# Patient Record
Sex: FEMALE | Race: BLACK OR AFRICAN AMERICAN | NOT HISPANIC OR LATINO | Employment: OTHER | URBAN - METROPOLITAN AREA
[De-identification: names, ages, dates, MRNs, and addresses within clinical notes are randomized per-mention and may not be internally consistent; named-entity substitution may affect disease eponyms.]

---

## 2017-03-08 PROBLEM — R11.2 NAUSEA & VOMITING: Status: ACTIVE | Noted: 2017-03-08

## 2017-03-08 PROBLEM — R05.9 COUGH: Status: ACTIVE | Noted: 2017-03-08

## 2017-03-08 PROBLEM — R68.83 CHILLS: Status: ACTIVE | Noted: 2017-03-08

## 2017-03-08 PROBLEM — R50.9 FEVER: Status: ACTIVE | Noted: 2017-03-08

## 2017-03-23 PROBLEM — J11.1 INFLUENZA: Status: ACTIVE | Noted: 2017-03-23

## 2021-12-21 ENCOUNTER — IMMUNIZATION (OUTPATIENT)
Dept: INTERNAL MEDICINE | Facility: CLINIC | Age: 76
End: 2021-12-21
Payer: MEDICARE

## 2021-12-21 DIAGNOSIS — Z23 NEED FOR VACCINATION: Primary | ICD-10-CM

## 2021-12-21 PROCEDURE — 0004A COVID-19, MRNA, LNP-S, PF, 30 MCG/0.3 ML DOSE VACCINE: CPT | Mod: PBBFAC,CV19

## 2022-04-12 ENCOUNTER — OFFICE VISIT (OUTPATIENT)
Dept: OPTOMETRY | Facility: CLINIC | Age: 77
End: 2022-04-12
Payer: COMMERCIAL

## 2022-04-12 DIAGNOSIS — H25.13 NUCLEAR SCLEROSIS, BILATERAL: ICD-10-CM

## 2022-04-12 DIAGNOSIS — H52.4 PRESBYOPIA: Primary | ICD-10-CM

## 2022-04-12 DIAGNOSIS — Z13.5 GLAUCOMA SCREENING: ICD-10-CM

## 2022-04-12 PROCEDURE — 99999 PR PBB SHADOW E&M-EST. PATIENT-LVL III: CPT | Mod: PBBFAC,,, | Performed by: OPTOMETRIST

## 2022-04-12 PROCEDURE — 92004 PR EYE EXAM, NEW PATIENT,COMPREHESV: ICD-10-PCS | Mod: S$GLB,,, | Performed by: OPTOMETRIST

## 2022-04-12 PROCEDURE — 99999 PR PBB SHADOW E&M-EST. PATIENT-LVL III: ICD-10-PCS | Mod: PBBFAC,,, | Performed by: OPTOMETRIST

## 2022-04-12 PROCEDURE — 92004 COMPRE OPH EXAM NEW PT 1/>: CPT | Mod: S$GLB,,, | Performed by: OPTOMETRIST

## 2022-04-12 PROCEDURE — 92015 DETERMINE REFRACTIVE STATE: CPT | Mod: S$GLB,,, | Performed by: OPTOMETRIST

## 2022-04-12 PROCEDURE — 92015 PR REFRACTION: ICD-10-PCS | Mod: S$GLB,,, | Performed by: OPTOMETRIST

## 2022-04-12 NOTE — PROGRESS NOTES
HPI     ENDER: 2 years ago    Presents today to establish ocular health care.  No vision complaints.      Last edited by Alia Oneill MA on 4/12/2022  9:39 AM. (History)        ROS     Negative for: Constitutional, Gastrointestinal, Neurological, Skin,   Genitourinary, Musculoskeletal, HENT, Endocrine, Cardiovascular, Eyes,   Respiratory, Psychiatric, Allergic/Imm, Heme/Lymph    Last edited by Duarte Perez, OD on 4/12/2022 11:14 AM. (History)        Assessment /Plan     For exam results, see Encounter Report.    Presbyopia    Glaucoma screening    Nuclear sclerosis, bilateral       present for exam    1. Cat OU--wrote optional new Rx  2. MELISSA--advised SOOTHE XP ATs prn    PLAN:    rtc 1 yr                  F25.9

## 2022-05-04 ENCOUNTER — HOSPITAL ENCOUNTER (OUTPATIENT)
Facility: HOSPITAL | Age: 77
Discharge: HOME | End: 2022-05-05
Attending: EMERGENCY MEDICINE | Admitting: INTERNAL MEDICINE
Payer: MEDICARE

## 2022-05-04 DIAGNOSIS — R41.89 COGNITIVE IMPAIRMENT: ICD-10-CM

## 2022-05-04 DIAGNOSIS — I67.4 HYPERTENSIVE ENCEPHALOPATHY: Primary | ICD-10-CM

## 2022-05-04 DIAGNOSIS — R07.9 CHEST PAIN: ICD-10-CM

## 2022-05-04 DIAGNOSIS — I16.0 HYPERTENSIVE URGENCY: ICD-10-CM

## 2022-05-04 DIAGNOSIS — M79.7 FIBROMYALGIA: ICD-10-CM

## 2022-05-04 DIAGNOSIS — R41.82 CHANGE IN MENTAL STATUS: ICD-10-CM

## 2022-05-04 LAB
ALBUMIN SERPL BCP-MCNC: 3.9 G/DL (ref 3.5–5.2)
ALP SERPL-CCNC: 86 U/L (ref 55–135)
ALT SERPL W/O P-5'-P-CCNC: 14 U/L (ref 10–44)
AMMONIA PLAS-SCNC: 28 UMOL/L (ref 10–50)
AMPHET+METHAMPHET UR QL: NEGATIVE
ANION GAP SERPL CALC-SCNC: 7 MMOL/L (ref 8–16)
AST SERPL-CCNC: 22 U/L (ref 10–40)
BARBITURATES UR QL SCN>200 NG/ML: NEGATIVE
BASOPHILS # BLD AUTO: 0.07 K/UL (ref 0–0.2)
BASOPHILS NFR BLD: 1.2 % (ref 0–1.9)
BENZODIAZ UR QL SCN>200 NG/ML: NEGATIVE
BILIRUB SERPL-MCNC: 1 MG/DL (ref 0.1–1)
BILIRUB UR QL STRIP: NEGATIVE
BUN SERPL-MCNC: 11 MG/DL (ref 8–23)
BZE UR QL SCN: NEGATIVE
CALCIUM SERPL-MCNC: 9.6 MG/DL (ref 8.7–10.5)
CANNABINOIDS UR QL SCN: NEGATIVE
CHLORIDE SERPL-SCNC: 110 MMOL/L (ref 95–110)
CLARITY UR REFRACT.AUTO: CLEAR
CO2 SERPL-SCNC: 24 MMOL/L (ref 23–29)
COLOR UR AUTO: COLORLESS
CREAT SERPL-MCNC: 0.8 MG/DL (ref 0.5–1.4)
CREAT UR-MCNC: 22 MG/DL (ref 15–325)
CTP QC/QA: YES
DIFFERENTIAL METHOD: ABNORMAL
EOSINOPHIL # BLD AUTO: 0 K/UL (ref 0–0.5)
EOSINOPHIL NFR BLD: 0.7 % (ref 0–8)
ERYTHROCYTE [DISTWIDTH] IN BLOOD BY AUTOMATED COUNT: 13.7 % (ref 11.5–14.5)
EST. GFR  (AFRICAN AMERICAN): >60 ML/MIN/1.73 M^2
EST. GFR  (NON AFRICAN AMERICAN): >60 ML/MIN/1.73 M^2
ETHANOL SERPL-MCNC: <10 MG/DL
GLUCOSE SERPL-MCNC: 85 MG/DL (ref 70–110)
GLUCOSE UR QL STRIP: NEGATIVE
HCT VFR BLD AUTO: 36.2 % (ref 37–48.5)
HGB BLD-MCNC: 11.6 G/DL (ref 12–16)
HGB UR QL STRIP: NEGATIVE
IMM GRANULOCYTES # BLD AUTO: 0.01 K/UL (ref 0–0.04)
IMM GRANULOCYTES NFR BLD AUTO: 0.2 % (ref 0–0.5)
KETONES UR QL STRIP: NEGATIVE
LEUKOCYTE ESTERASE UR QL STRIP: ABNORMAL
LYMPHOCYTES # BLD AUTO: 1.7 K/UL (ref 1–4.8)
LYMPHOCYTES NFR BLD: 30.4 % (ref 18–48)
MAGNESIUM SERPL-MCNC: 2.2 MG/DL (ref 1.6–2.6)
MCH RBC QN AUTO: 27.6 PG (ref 27–31)
MCHC RBC AUTO-ENTMCNC: 32 G/DL (ref 32–36)
MCV RBC AUTO: 86 FL (ref 82–98)
METHADONE UR QL SCN>300 NG/ML: NEGATIVE
MICROSCOPIC COMMENT: NORMAL
MONOCYTES # BLD AUTO: 0.4 K/UL (ref 0.3–1)
MONOCYTES NFR BLD: 7.1 % (ref 4–15)
NEUTROPHILS # BLD AUTO: 3.4 K/UL (ref 1.8–7.7)
NEUTROPHILS NFR BLD: 60.4 % (ref 38–73)
NITRITE UR QL STRIP: NEGATIVE
NRBC BLD-RTO: 0 /100 WBC
OPIATES UR QL SCN: NEGATIVE
PCP UR QL SCN>25 NG/ML: NEGATIVE
PH UR STRIP: 6 [PH] (ref 5–8)
PLATELET # BLD AUTO: 251 K/UL (ref 150–450)
PMV BLD AUTO: 10 FL (ref 9.2–12.9)
POCT GLUCOSE: 84 MG/DL (ref 70–110)
POTASSIUM SERPL-SCNC: 3.9 MMOL/L (ref 3.5–5.1)
PROT SERPL-MCNC: 7.1 G/DL (ref 6–8.4)
PROT UR QL STRIP: NEGATIVE
RBC # BLD AUTO: 4.2 M/UL (ref 4–5.4)
RBC #/AREA URNS AUTO: 2 /HPF (ref 0–4)
SARS-COV-2 RDRP RESP QL NAA+PROBE: NEGATIVE
SODIUM SERPL-SCNC: 141 MMOL/L (ref 136–145)
SP GR UR STRIP: 1 (ref 1–1.03)
SQUAMOUS #/AREA URNS AUTO: 0 /HPF
TOXICOLOGY INFORMATION: NORMAL
TSH SERPL DL<=0.005 MIU/L-ACNC: 2.69 UIU/ML (ref 0.4–4)
URN SPEC COLLECT METH UR: ABNORMAL
WBC # BLD AUTO: 5.66 K/UL (ref 3.9–12.7)
WBC #/AREA URNS AUTO: 1 /HPF (ref 0–5)

## 2022-05-04 PROCEDURE — 99291 PR CRITICAL CARE, E/M 30-74 MINUTES: ICD-10-PCS | Mod: CS,,, | Performed by: EMERGENCY MEDICINE

## 2022-05-04 PROCEDURE — 99220 PR INITIAL OBSERVATION CARE,LEVL III: ICD-10-PCS | Mod: ,,, | Performed by: PHYSICIAN ASSISTANT

## 2022-05-04 PROCEDURE — 80307 DRUG TEST PRSMV CHEM ANLYZR: CPT | Performed by: EMERGENCY MEDICINE

## 2022-05-04 PROCEDURE — 83735 ASSAY OF MAGNESIUM: CPT | Performed by: EMERGENCY MEDICINE

## 2022-05-04 PROCEDURE — 99220 PR INITIAL OBSERVATION CARE,LEVL III: CPT | Mod: ,,, | Performed by: PHYSICIAN ASSISTANT

## 2022-05-04 PROCEDURE — 82077 ASSAY SPEC XCP UR&BREATH IA: CPT | Performed by: EMERGENCY MEDICINE

## 2022-05-04 PROCEDURE — U0002 COVID-19 LAB TEST NON-CDC: HCPCS | Performed by: EMERGENCY MEDICINE

## 2022-05-04 PROCEDURE — 93005 ELECTROCARDIOGRAM TRACING: CPT

## 2022-05-04 PROCEDURE — 81001 URINALYSIS AUTO W/SCOPE: CPT | Mod: 59 | Performed by: EMERGENCY MEDICINE

## 2022-05-04 PROCEDURE — G0378 HOSPITAL OBSERVATION PER HR: HCPCS

## 2022-05-04 PROCEDURE — 99291 CRITICAL CARE FIRST HOUR: CPT | Mod: CS,,, | Performed by: EMERGENCY MEDICINE

## 2022-05-04 PROCEDURE — 25500020 PHARM REV CODE 255: Performed by: INTERNAL MEDICINE

## 2022-05-04 PROCEDURE — 82962 GLUCOSE BLOOD TEST: CPT

## 2022-05-04 PROCEDURE — 84443 ASSAY THYROID STIM HORMONE: CPT | Performed by: EMERGENCY MEDICINE

## 2022-05-04 PROCEDURE — 82140 ASSAY OF AMMONIA: CPT | Performed by: EMERGENCY MEDICINE

## 2022-05-04 PROCEDURE — 96374 THER/PROPH/DIAG INJ IV PUSH: CPT | Mod: 59

## 2022-05-04 PROCEDURE — 80053 COMPREHEN METABOLIC PANEL: CPT | Performed by: EMERGENCY MEDICINE

## 2022-05-04 PROCEDURE — 99291 CRITICAL CARE FIRST HOUR: CPT | Mod: 25

## 2022-05-04 PROCEDURE — 93010 EKG 12-LEAD: ICD-10-PCS | Mod: ,,, | Performed by: INTERNAL MEDICINE

## 2022-05-04 PROCEDURE — 93010 ELECTROCARDIOGRAM REPORT: CPT | Mod: ,,, | Performed by: INTERNAL MEDICINE

## 2022-05-04 PROCEDURE — 25000003 PHARM REV CODE 250: Performed by: EMERGENCY MEDICINE

## 2022-05-04 PROCEDURE — 86803 HEPATITIS C AB TEST: CPT | Performed by: EMERGENCY MEDICINE

## 2022-05-04 PROCEDURE — 85025 COMPLETE CBC W/AUTO DIFF WBC: CPT | Performed by: EMERGENCY MEDICINE

## 2022-05-04 PROCEDURE — 96372 THER/PROPH/DIAG INJ SC/IM: CPT | Mod: 59 | Performed by: PHYSICIAN ASSISTANT

## 2022-05-04 PROCEDURE — 63600175 PHARM REV CODE 636 W HCPCS: Performed by: PHYSICIAN ASSISTANT

## 2022-05-04 PROCEDURE — A9585 GADOBUTROL INJECTION: HCPCS | Performed by: INTERNAL MEDICINE

## 2022-05-04 RX ORDER — LABETALOL HCL 20 MG/4 ML
10 SYRINGE (ML) INTRAVENOUS
Status: COMPLETED | OUTPATIENT
Start: 2022-05-04 | End: 2022-05-04

## 2022-05-04 RX ORDER — POLYETHYLENE GLYCOL 3350 17 G/17G
17 POWDER, FOR SOLUTION ORAL DAILY PRN
Status: DISCONTINUED | OUTPATIENT
Start: 2022-05-04 | End: 2022-05-05 | Stop reason: HOSPADM

## 2022-05-04 RX ORDER — HYDRALAZINE HYDROCHLORIDE 20 MG/ML
10 INJECTION INTRAMUSCULAR; INTRAVENOUS
Status: ACTIVE | OUTPATIENT
Start: 2022-05-04 | End: 2022-05-05

## 2022-05-04 RX ORDER — PROCHLORPERAZINE EDISYLATE 5 MG/ML
5 INJECTION INTRAMUSCULAR; INTRAVENOUS EVERY 6 HOURS PRN
Status: DISCONTINUED | OUTPATIENT
Start: 2022-05-04 | End: 2022-05-05 | Stop reason: HOSPADM

## 2022-05-04 RX ORDER — TRAMADOL HYDROCHLORIDE 50 MG/1
50 TABLET ORAL EVERY 6 HOURS PRN
Status: DISCONTINUED | OUTPATIENT
Start: 2022-05-04 | End: 2022-05-05

## 2022-05-04 RX ORDER — SODIUM CHLORIDE 0.9 % (FLUSH) 0.9 %
10 SYRINGE (ML) INJECTION EVERY 8 HOURS PRN
Status: DISCONTINUED | OUTPATIENT
Start: 2022-05-04 | End: 2022-05-05 | Stop reason: HOSPADM

## 2022-05-04 RX ORDER — TRAZODONE HYDROCHLORIDE 50 MG/1
50 TABLET ORAL NIGHTLY PRN
Status: DISCONTINUED | OUTPATIENT
Start: 2022-05-04 | End: 2022-05-05

## 2022-05-04 RX ORDER — TALC
6 POWDER (GRAM) TOPICAL NIGHTLY PRN
Status: DISCONTINUED | OUTPATIENT
Start: 2022-05-04 | End: 2022-05-05 | Stop reason: HOSPADM

## 2022-05-04 RX ORDER — NIFEDIPINE 30 MG/1
60 TABLET, EXTENDED RELEASE ORAL
Status: COMPLETED | OUTPATIENT
Start: 2022-05-04 | End: 2022-05-04

## 2022-05-04 RX ORDER — ACETAMINOPHEN 500 MG
1000 TABLET ORAL
Status: COMPLETED | OUTPATIENT
Start: 2022-05-04 | End: 2022-05-04

## 2022-05-04 RX ORDER — LOSARTAN POTASSIUM 50 MG/1
50 TABLET ORAL DAILY
Refills: 3 | Status: DISCONTINUED | OUTPATIENT
Start: 2022-05-05 | End: 2022-05-05

## 2022-05-04 RX ORDER — IBUPROFEN 200 MG
16 TABLET ORAL
Status: DISCONTINUED | OUTPATIENT
Start: 2022-05-04 | End: 2022-05-05 | Stop reason: HOSPADM

## 2022-05-04 RX ORDER — GLUCAGON 1 MG
1 KIT INJECTION
Status: DISCONTINUED | OUTPATIENT
Start: 2022-05-04 | End: 2022-05-05 | Stop reason: HOSPADM

## 2022-05-04 RX ORDER — NIFEDIPINE 60 MG/1
60 TABLET, EXTENDED RELEASE ORAL DAILY
Status: DISCONTINUED | OUTPATIENT
Start: 2022-05-05 | End: 2022-05-05 | Stop reason: HOSPADM

## 2022-05-04 RX ORDER — ONDANSETRON 2 MG/ML
4 INJECTION INTRAMUSCULAR; INTRAVENOUS EVERY 8 HOURS PRN
Status: DISCONTINUED | OUTPATIENT
Start: 2022-05-04 | End: 2022-05-05 | Stop reason: HOSPADM

## 2022-05-04 RX ORDER — ACETAMINOPHEN 325 MG/1
650 TABLET ORAL EVERY 4 HOURS PRN
Status: DISCONTINUED | OUTPATIENT
Start: 2022-05-04 | End: 2022-05-05 | Stop reason: HOSPADM

## 2022-05-04 RX ORDER — BISACODYL 10 MG
10 SUPPOSITORY, RECTAL RECTAL DAILY PRN
Status: DISCONTINUED | OUTPATIENT
Start: 2022-05-04 | End: 2022-05-05 | Stop reason: HOSPADM

## 2022-05-04 RX ORDER — GADOBUTROL 604.72 MG/ML
7 INJECTION INTRAVENOUS
Status: COMPLETED | OUTPATIENT
Start: 2022-05-04 | End: 2022-05-04

## 2022-05-04 RX ORDER — DULOXETIN HYDROCHLORIDE 30 MG/1
30 CAPSULE, DELAYED RELEASE ORAL DAILY
Status: DISCONTINUED | OUTPATIENT
Start: 2022-05-05 | End: 2022-05-05

## 2022-05-04 RX ORDER — LANOLIN ALCOHOL/MO/W.PET/CERES
800 CREAM (GRAM) TOPICAL
Status: DISCONTINUED | OUTPATIENT
Start: 2022-05-04 | End: 2022-05-05 | Stop reason: HOSPADM

## 2022-05-04 RX ORDER — IPRATROPIUM BROMIDE AND ALBUTEROL SULFATE 2.5; .5 MG/3ML; MG/3ML
3 SOLUTION RESPIRATORY (INHALATION) EVERY 4 HOURS PRN
Status: DISCONTINUED | OUTPATIENT
Start: 2022-05-04 | End: 2022-05-05 | Stop reason: HOSPADM

## 2022-05-04 RX ORDER — NALOXONE HCL 0.4 MG/ML
0.02 VIAL (ML) INJECTION
Status: DISCONTINUED | OUTPATIENT
Start: 2022-05-04 | End: 2022-05-05 | Stop reason: HOSPADM

## 2022-05-04 RX ORDER — ENOXAPARIN SODIUM 100 MG/ML
40 INJECTION SUBCUTANEOUS EVERY 24 HOURS
Status: DISCONTINUED | OUTPATIENT
Start: 2022-05-04 | End: 2022-05-05 | Stop reason: HOSPADM

## 2022-05-04 RX ORDER — TIZANIDINE 4 MG/1
4 TABLET ORAL EVERY 8 HOURS PRN
Status: DISCONTINUED | OUTPATIENT
Start: 2022-05-04 | End: 2022-05-05

## 2022-05-04 RX ORDER — IBUPROFEN 200 MG
24 TABLET ORAL
Status: DISCONTINUED | OUTPATIENT
Start: 2022-05-04 | End: 2022-05-05 | Stop reason: HOSPADM

## 2022-05-04 RX ADMIN — Medication 10 MG: at 08:05

## 2022-05-04 RX ADMIN — GADOBUTROL 7 ML: 604.72 INJECTION INTRAVENOUS at 10:05

## 2022-05-04 RX ADMIN — NIFEDIPINE 60 MG: 30 TABLET, FILM COATED, EXTENDED RELEASE ORAL at 10:05

## 2022-05-04 RX ADMIN — ACETAMINOPHEN 1000 MG: 500 TABLET ORAL at 10:05

## 2022-05-04 RX ADMIN — ENOXAPARIN SODIUM 40 MG: 100 INJECTION SUBCUTANEOUS at 11:05

## 2022-05-04 NOTE — ED NOTES
Patient identifiers for Mamta Bates 77 y.o. female checked and correct.  Chief Complaint   Patient presents with    Multiple complaints     Per ,?dementia, seeing lights, more slurred speech, pain to both lower legs, dr rodriguez spoke with pt and      Past Medical History:   Diagnosis Date    Fibromyalgia     Hypertension      Allergies reported: Review of patient's allergies indicates:  No Known Allergies      LOC: Patient is awake, alert, and aware of environment with an appropriate affect. Patient is oriented x 4 and speaking appropriately.  APPEARANCE: Patient resting comfortably and in no acute distress. Patient is clean and well groomed, patient's clothing is properly fastened.  HEENT: Pt presents with surgical mask on.   SKIN: The skin is warm and dry. Patient has normal skin turgor and moist mucus membranes.   MUSKULOSKELETAL: Patient is moving all extremities well, no obvious deformities noted. Pulses intact.   RESPIRATORY: Airway is open and patent. Respirations are spontaneous and non-labored with normal effort and rate.  CARDIAC: Patient has a normal rate and rhythm. 84 on cardiac monitor. No peripheral edema noted.   ABDOMEN: No distention noted. Soft and non-tender upon palpation.  NEUROLOGICAL: pupils 3mm, PERRL. Facial expression is symmetrical. Hand grasps are equal bilaterally. Normal sensation in all extremities when touched with finger.

## 2022-05-04 NOTE — ED PROVIDER NOTES
"Encounter Date: 5/4/2022       History     Chief Complaint   Patient presents with    Multiple complaints     Per ,?dementia, seeing lights, more slurred speech, pain to both lower legs, dr rodriguez spoke with pt and      Patient is a 77-year-old female past medical history fibromyalgia, hypertension, hysterectomy who presents with her  for concern for altered mental status including slurred speech, pain to lower extremities, seeing lights, and not making sense.  Patient's  reports that he believes that she has had some early signs of dementia at getting worse over the past few months however patient does not agree to seek any medical care until today.  He says it has been hard to convince her to seek any treatment.  He states that sometimes she is hallucinating and sometimes she has garbled speech.  He states today he left the house this morning at 9:00 a.m. to run errands.  At that time she was not "normal" but that she was at her baseline.  When he returned home about 2-1/2 hours ago he noted that she seemed to have slurred speech, reporting seeing lights, complaining severe pain to her legs.  Pt has not been compliant with her medications.  Sometimes takes Tramadol for pain but none recently.     The history is provided by the patient.     Review of patient's allergies indicates:  No Known Allergies  Past Medical History:   Diagnosis Date    Fibromyalgia     Hypertension      Past Surgical History:   Procedure Laterality Date    HYSTERECTOMY       Family History   Problem Relation Age of Onset    Heart disease Mother      Social History     Tobacco Use    Smoking status: Never Smoker    Smokeless tobacco: Never Used   Substance Use Topics    Alcohol use: No    Drug use: No     Review of Systems  General: No fever.  No chills.  Eyes: No visual changes.  Head: No headache.    Integument: No rashes or lesions.  Chest: No shortness of breath.  Cardiovascular: No chest " pain.  Abdomen: No abdominal pain.  No nausea or vomiting.  Urinary: No abnormal urination.  Neurologic: No focal weakness.  No numbness.  Hematologic: No easy bruising.  Endocrine: No excessive thirst or urination.    Physical Exam     Initial Vitals [05/04/22 1640]   BP Pulse Resp Temp SpO2   (!) 194/86 84 18 98.1 °F (36.7 °C) 97 %      MAP       --         Physical Exam    Nursing note and vitals reviewed.  Constitutional: She appears well-developed and well-nourished. She is not diaphoretic. No distress.   HENT:   Head: Normocephalic and atraumatic.   Eyes: Conjunctivae and EOM are normal.   Neck: Neck supple.   Normal range of motion.  Cardiovascular: Normal rate, regular rhythm and intact distal pulses.   Pulmonary/Chest: No respiratory distress.   Abdominal: Abdomen is soft. She exhibits no distension.   Musculoskeletal:         General: No tenderness or edema. Normal range of motion.      Cervical back: Normal range of motion and neck supple.     Neurological: She is alert and oriented to person, place, and time. She has normal strength. No cranial nerve deficit or sensory deficit. GCS score is 15. GCS eye subscore is 4. GCS verbal subscore is 5. GCS motor subscore is 6.   Skin: Skin is warm and dry.   Psychiatric: She has a normal mood and affect. Judgment normal.         ED Course   Procedures  Labs Reviewed   CBC W/ AUTO DIFFERENTIAL - Abnormal; Notable for the following components:       Result Value    Hemoglobin 11.6 (*)     Hematocrit 36.2 (*)     All other components within normal limits   COMPREHENSIVE METABOLIC PANEL - Abnormal; Notable for the following components:    Anion Gap 7 (*)     All other components within normal limits   URINALYSIS, REFLEX TO URINE CULTURE - Abnormal; Notable for the following components:    Color, UA Colorless (*)     Leukocytes, UA 1+ (*)     All other components within normal limits    Narrative:     Specimen Source->Urine   BASIC METABOLIC PANEL - Abnormal; Notable  for the following components:    Chloride 113 (*)     CO2 20 (*)     Anion Gap 7 (*)     All other components within normal limits   CBC W/ AUTO DIFFERENTIAL - Abnormal; Notable for the following components:    RBC 3.63 (*)     Hemoglobin 10.3 (*)     Hematocrit 33.2 (*)     MCHC 31.0 (*)     All other components within normal limits   TSH   DRUG SCREEN PANEL, URINE EMERGENCY    Narrative:     Specimen Source->Urine   AMMONIA   ALCOHOL,MEDICAL (ETHANOL)   MAGNESIUM   URINALYSIS MICROSCOPIC    Narrative:     Specimen Source->Urine   MAGNESIUM   LIPID PANEL   HEPATITIS C ANTIBODY   SARS-COV-2 RDRP GENE    Narrative:     This test utilizes isothermal nucleic acid amplification   technology to detect the SARS-CoV-2 RdRp nucleic acid segment.   The analytical sensitivity (limit of detection) is 125 genome   equivalents/mL.   A POSITIVE result implies infection with the SARS-CoV-2 virus;   the patient is presumed to be contagious.     A NEGATIVE result means that SARS-CoV-2 nucleic acids are not   present above the limit of detection. A NEGATIVE result should be   treated as presumptive. It does not rule out the possibility of   COVID-19 and should not be the sole basis for treatment decisions.   If COVID-19 is strongly suspected based on clinical and exposure   history, re-testing using an alternate molecular assay should be   considered.   This test is only for use under the Food and Drug   Administration s Emergency Use Authorization (EUA).   Commercial kits are provided by [employer].   Performance characteristics of the EUA have been independently   verified by [facility] Department of   Pathology and Laboratory Medicine.   _________________________________________________________________   The authorized Fact Sheet for Healthcare Providers and the authorized Fact   Sheet for Patients of the ID NOW COVID-19 are available on the FDA   website:      https://www.fda.gov/media/255000/download  https://www.fda.gov/media/948288/download           POCT GLUCOSE        ECG Results          EKG 12-lead (Final result)  Result time 05/05/22 10:33:46    Final result by Interface, Lab [facility] (05/05/22 10:33:46)                 Narrative:    Test Reason : R41.82,    Vent. Rate : 071 BPM     Atrial Rate : 071 BPM     P-R Int : 166 ms          QRS Dur : 068 ms      QT Int : 384 ms       P-R-T Axes : 068 011 045 degrees     QTc Int : 417 ms    Normal sinus rhythm  Low voltage QRS  Abnormal ECG  No previous ECGs available  Confirmed by LISA SORTO MD ([id number]) on 5/5/2022 10:33:36 AM    Referred By: EB   SELF           Confirmed By:LISA SORTO MD                            Imaging Results          MRI Brain W WO Contrast (Final result)  Result time 05/04/22 23:02:32    Final result by Kimberley Moore MD (05/04/22 23:02:32)                 Impression:      1. Allowing for patient motion artifact, no acute intracranial abnormality identified on today's exam.  Specifically, no evidence of acute infarct or enhancing lesion.  2. Generalized cerebral volume loss with compensatory prominence of cerebral sulci, extra-axial spaces and ventricular system.  Minimal periventricular chronic microvascular ischemic change.      Electronically signed by: Kimberley Moore MD  Date:    05/04/2022  Time:    23:02             Narrative:    EXAMINATION:  MRI BRAIN W WO CONTRAST    CLINICAL HISTORY:  Mental status change, unknown cause;.    TECHNIQUE:  Multiplanar multisequence MR imaging of the brain was performed before and after the administration of 7 mL Gadavist  intravenous contrast.    COMPARISON:  Head CT 05/04/2022    FINDINGS:  Please note image quality is mildly degraded by patient motion artifact, most notably postcontrast images.  There is no abnormal restricted diffusion to suggest acute infarction.  There is generalized cerebral volume loss with compensatory  prominence of the extra-axial spaces, cerebral sulci and ventricular system.  The ventricles demonstrate no evidence of hydrocephalus or midline shift.  Minimal periventricular T2/FLAIR hyperintensity which is nonspecific but likely reflect sequela of chronic microvascular ischemic change.  There are no abnormal areas of gradient susceptibility to suggest parenchymal hemorrhage.  No extra-axial hemorrhage or fluid collections.  Following the administration of IV contrast, allowing for motion artifact, there are no definite pathologic foci of enhancement.  The craniocervical junction and sellar regions are within normal limits.                               CT Head Without Contrast (Final result)  Result time 05/04/22 20:03:20    Final result by Duarte Blakely MD (05/04/22 20:03:20)                 Impression:      No acute intraparenchymal hemorrhage or major vascular distribution infarction.    Mild generalized cerebral volume loss and chronic ischemic microvascular changes.    Electronically signed by resident: Bailey Cisse MD  Date:    05/04/2022  Time:    19:52    Electronically signed by: Duarte Blakely MD  Date:    05/04/2022  Time:    20:03             Narrative:    EXAMINATION:  CT HEAD WITHOUT CONTRAST    CLINICAL HISTORY:  Mental status change, unknown cause;    TECHNIQUE:  Low dose axial CT images obtained throughout the head without intravenous contrast. Sagittal and coronal reconstructions were performed.    COMPARISON:  None.    FINDINGS:  Intracranial compartment:    Mild generalized cerebral volume loss with compensatory enlargement of the ventricles and sulci.  No evidence of hydrocephalus.  No extra-axial blood or fluid collections.    Bilateral basal ganglia punctate calcifications.  Patchy periventricular white matter hypoattenuation which is nonspecific, however likely related to chronic ischemic microvascular changes.  No evidence of acute intraparenchymal hemorrhage or major vascular  distribution infarction.    Skull/extracranial contents (limited evaluation): No fracture. Mastoid air cells and paranasal sinuses are essentially clear.                               X-Ray Chest AP Portable (Final result)  Result time 05/04/22 19:02:28    Final result by Duarte Blakely MD (05/04/22 19:02:28)                 Impression:      No radiographic acute intrathoracic process seen on this single view.      Electronically signed by: Duarte Blakely MD  Date:    05/04/2022  Time:    19:02             Narrative:    EXAMINATION:  XR CHEST AP PORTABLE    CLINICAL HISTORY:  altered mental status;    TECHNIQUE:  Single frontal view of the chest was performed.    COMPARISON:  None.    FINDINGS:  Monitoring leads overlie the chest.  Patient is rotated.    Cardiomediastinal silhouette is midline noting calcification and slight tortuosity of the aorta and upper limits of normal cardiac silhouette.  Pulmonary vasculature and hilar contours are within normal limits.  Few scattered linear opacities throughout the lungs consistent with minimal platelike scarring versus atelectasis.  The lungs are otherwise well expanded without large consolidation, pleural effusion or pneumothorax.  No acute osseous process seen.  PA and lateral views can be obtained.                                 Medications   hydrALAZINE injection 10 mg ( Intravenous Canceled Entry 5/4/22 2030)   NIFEdipine 24 hr tablet 60 mg (60 mg Oral Given 5/5/22 0821)   sodium chloride 0.9% flush 10 mL (has no administration in time range)   albuterol-ipratropium 2.5 mg-0.5 mg/3 mL nebulizer solution 3 mL (has no administration in time range)   melatonin tablet 6 mg (has no administration in time range)   polyethylene glycol packet 17 g (has no administration in time range)   bisacodyL suppository 10 mg (has no administration in time range)   acetaminophen tablet 650 mg (has no administration in time range)   naloxone 0.4 mg/mL injection 0.02 mg (has no  administration in time range)   magnesium oxide tablet 800 mg (has no administration in time range)   magnesium oxide tablet 800 mg (has no administration in time range)   glucose chewable tablet 16 g (has no administration in time range)   glucose chewable tablet 24 g (has no administration in time range)   glucagon (human recombinant) injection 1 mg (has no administration in time range)   dextrose 10% bolus 125 mL (has no administration in time range)   dextrose 10% bolus 250 mL (has no administration in time range)   enoxaparin injection 40 mg (40 mg Subcutaneous Given 5/4/22 2345)   ondansetron injection 4 mg (has no administration in time range)   prochlorperazine injection Soln 5 mg (has no administration in time range)   quetiapine split tablet 12.5 mg (12.5 mg Oral Given 5/5/22 0050)   traMADoL tablet 50 mg (has no administration in time range)   labetalol 20 mg/4 mL (5 mg/mL) IV syring (10 mg Intravenous Given 5/4/22 2002)   NIFEdipine 24 hr tablet 60 mg (60 mg Oral Given 5/4/22 2215)   acetaminophen tablet 1,000 mg (1,000 mg Oral Given 5/4/22 2215)   gadobutroL (GADAVIST) injection 7 mL (7 mLs Intravenous Given 5/4/22 2204)     Medical Decision Making:   History:   Old Medical Records: I decided to obtain old medical records.  Old Records Summarized: records from clinic visits.       <> Summary of Records: Several years since patient has seen a doctor  Initial Assessment:   Initial sbp  190 I suspect she may have hypertensive encephalopathy  Differential Diagnosis:   Hypertensive emergency, HTN encephalopathy, electrolyte disturbance, brain mass  Clinical Tests:   Lab Tests: Ordered and Reviewed  Radiological Study: Ordered and Reviewed  Medical Tests: Ordered and Reviewed  ED Management:  Suspect hypertensive encephalopathy  Treated with labetolol iv and oral nifedipine  Will admit pending MRI    Critical Care  Date: 05/05/2022  Performed by: Adela Scott MD    Authorized by: Adela WHITE  MD Tyler   Total critical care time (exclusive of procedural time) : 35 minutes  Critical care was necessary to treat or prevent imminent or life-threatening deterioration of the following conditions:  AMS, hypertensive encephalopathy                        Clinical Impression:   Final diagnoses:  [R41.82] Change in mental status  [I67.4] Hypertensive encephalopathy (Primary)          ED Disposition Condition    Observation               Adela Scott MD  05/05/22 1634

## 2022-05-05 ENCOUNTER — TELEPHONE (OUTPATIENT)
Dept: RHEUMATOLOGY | Facility: CLINIC | Age: 77
End: 2022-05-05
Payer: MEDICARE

## 2022-05-05 VITALS
RESPIRATION RATE: 18 BRPM | DIASTOLIC BLOOD PRESSURE: 70 MMHG | TEMPERATURE: 99 F | OXYGEN SATURATION: 96 % | BODY MASS INDEX: 23.56 KG/M2 | SYSTOLIC BLOOD PRESSURE: 130 MMHG | WEIGHT: 138 LBS | HEART RATE: 99 BPM | HEIGHT: 64 IN

## 2022-05-05 PROBLEM — Z91.148 MEDICATION NONCOMPLIANCE DUE TO COGNITIVE IMPAIRMENT: Status: ACTIVE | Noted: 2022-05-05

## 2022-05-05 PROBLEM — R41.9 MEDICATION NONCOMPLIANCE DUE TO COGNITIVE IMPAIRMENT: Status: ACTIVE | Noted: 2022-05-05

## 2022-05-05 PROBLEM — R41.89 COGNITIVE IMPAIRMENT: Status: ACTIVE | Noted: 2022-05-05

## 2022-05-05 LAB
ANION GAP SERPL CALC-SCNC: 7 MMOL/L (ref 8–16)
ASCENDING AORTA: 3.02 CM
AV INDEX (PROSTH): 0.78
AV MEAN GRADIENT: 4 MMHG
AV PEAK GRADIENT: 7 MMHG
AV VALVE AREA: 2.17 CM2
AV VELOCITY RATIO: 0.84
BASOPHILS # BLD AUTO: 0.05 K/UL (ref 0–0.2)
BASOPHILS NFR BLD: 0.9 % (ref 0–1.9)
BSA FOR ECHO PROCEDURE: 1.68 M2
BUN SERPL-MCNC: 9 MG/DL (ref 8–23)
CALCIUM SERPL-MCNC: 8.7 MG/DL (ref 8.7–10.5)
CHLORIDE SERPL-SCNC: 113 MMOL/L (ref 95–110)
CHOLEST SERPL-MCNC: 189 MG/DL (ref 120–199)
CHOLEST/HDLC SERPL: 3.6 {RATIO} (ref 2–5)
CO2 SERPL-SCNC: 20 MMOL/L (ref 23–29)
CREAT SERPL-MCNC: 0.7 MG/DL (ref 0.5–1.4)
CV ECHO LV RWT: 0.52 CM
DIFFERENTIAL METHOD: ABNORMAL
DOP CALC AO PEAK VEL: 1.29 M/S
DOP CALC AO VTI: 23.14 CM
DOP CALC LVOT AREA: 2.8 CM2
DOP CALC LVOT DIAMETER: 1.88 CM
DOP CALC LVOT PEAK VEL: 1.09 M/S
DOP CALC LVOT STROKE VOLUME: 50.19 CM3
DOP CALCLVOT PEAK VEL VTI: 18.09 CM
E WAVE DECELERATION TIME: 249.7 MSEC
E/A RATIO: 0.89
E/E' RATIO: 11.47 M/S
ECHO LV POSTERIOR WALL: 0.87 CM (ref 0.6–1.1)
EJECTION FRACTION: 65 %
EOSINOPHIL # BLD AUTO: 0 K/UL (ref 0–0.5)
EOSINOPHIL NFR BLD: 0.7 % (ref 0–8)
ERYTHROCYTE [DISTWIDTH] IN BLOOD BY AUTOMATED COUNT: 13.3 % (ref 11.5–14.5)
EST. GFR  (AFRICAN AMERICAN): >60 ML/MIN/1.73 M^2
EST. GFR  (NON AFRICAN AMERICAN): >60 ML/MIN/1.73 M^2
FRACTIONAL SHORTENING: 27 % (ref 28–44)
GLUCOSE SERPL-MCNC: 84 MG/DL (ref 70–110)
HCT VFR BLD AUTO: 33.2 % (ref 37–48.5)
HDLC SERPL-MCNC: 52 MG/DL (ref 40–75)
HDLC SERPL: 27.5 % (ref 20–50)
HGB BLD-MCNC: 10.3 G/DL (ref 12–16)
IMM GRANULOCYTES # BLD AUTO: 0.01 K/UL (ref 0–0.04)
IMM GRANULOCYTES NFR BLD AUTO: 0.2 % (ref 0–0.5)
INTERVENTRICULAR SEPTUM: 0.81 CM (ref 0.6–1.1)
LA MAJOR: 4.55 CM
LA MINOR: 4.18 CM
LA WIDTH: 2.98 CM
LDLC SERPL CALC-MCNC: 122 MG/DL (ref 63–159)
LEFT ATRIUM SIZE: 2.75 CM
LEFT ATRIUM VOLUME INDEX: 18.2 ML/M2
LEFT ATRIUM VOLUME: 30.35 CM3
LEFT INTERNAL DIMENSION IN SYSTOLE: 2.45 CM (ref 2.1–4)
LEFT VENTRICLE DIASTOLIC VOLUME INDEX: 27.63 ML/M2
LEFT VENTRICLE DIASTOLIC VOLUME: 46.15 ML
LEFT VENTRICLE MASS INDEX: 45 G/M2
LEFT VENTRICLE SYSTOLIC VOLUME INDEX: 12.8 ML/M2
LEFT VENTRICLE SYSTOLIC VOLUME: 21.31 ML
LEFT VENTRICULAR INTERNAL DIMENSION IN DIASTOLE: 3.36 CM (ref 3.5–6)
LEFT VENTRICULAR MASS: 75.56 G
LV LATERAL E/E' RATIO: 10.75 M/S
LV SEPTAL E/E' RATIO: 12.29 M/S
LYMPHOCYTES # BLD AUTO: 1.8 K/UL (ref 1–4.8)
LYMPHOCYTES NFR BLD: 31.7 % (ref 18–48)
MAGNESIUM SERPL-MCNC: 1.9 MG/DL (ref 1.6–2.6)
MCH RBC QN AUTO: 28.4 PG (ref 27–31)
MCHC RBC AUTO-ENTMCNC: 31 G/DL (ref 32–36)
MCV RBC AUTO: 92 FL (ref 82–98)
MONOCYTES # BLD AUTO: 0.4 K/UL (ref 0.3–1)
MONOCYTES NFR BLD: 7.3 % (ref 4–15)
MV PEAK A VEL: 0.97 M/S
MV PEAK E VEL: 0.86 M/S
MV STENOSIS PRESSURE HALF TIME: 72.41 MS
MV VALVE AREA P 1/2 METHOD: 3.04 CM2
NEUTROPHILS # BLD AUTO: 3.3 K/UL (ref 1.8–7.7)
NEUTROPHILS NFR BLD: 59.2 % (ref 38–73)
NONHDLC SERPL-MCNC: 137 MG/DL
NRBC BLD-RTO: 0 /100 WBC
PISA TR MAX VEL: 2.58 M/S
PLATELET # BLD AUTO: 219 K/UL (ref 150–450)
PMV BLD AUTO: 10.4 FL (ref 9.2–12.9)
POTASSIUM SERPL-SCNC: 3.5 MMOL/L (ref 3.5–5.1)
RA MAJOR: 4.2 CM
RA PRESSURE: 8 MMHG
RA WIDTH: 2.87 CM
RBC # BLD AUTO: 3.63 M/UL (ref 4–5.4)
RIGHT VENTRICULAR END-DIASTOLIC DIMENSION: 2.95 CM
RV TISSUE DOPPLER FREE WALL SYSTOLIC VELOCITY 1 (APICAL 4 CHAMBER VIEW): 13 CM/S
SINUS: 2.79 CM
SODIUM SERPL-SCNC: 140 MMOL/L (ref 136–145)
STJ: 3 CM
TDI LATERAL: 0.08 M/S
TDI SEPTAL: 0.07 M/S
TDI: 0.08 M/S
TR MAX PG: 27 MMHG
TRICUSPID ANNULAR PLANE SYSTOLIC EXCURSION: 1.76 CM
TRIGL SERPL-MCNC: 75 MG/DL (ref 30–150)
TV REST PULMONARY ARTERY PRESSURE: 35 MMHG
WBC # BLD AUTO: 5.58 K/UL (ref 3.9–12.7)

## 2022-05-05 PROCEDURE — 80061 LIPID PANEL: CPT | Performed by: PHYSICIAN ASSISTANT

## 2022-05-05 PROCEDURE — 99217 PR OBSERVATION CARE DISCHARGE: CPT | Mod: ,,, | Performed by: PHYSICIAN ASSISTANT

## 2022-05-05 PROCEDURE — 25000003 PHARM REV CODE 250: Performed by: PHYSICIAN ASSISTANT

## 2022-05-05 PROCEDURE — 85025 COMPLETE CBC W/AUTO DIFF WBC: CPT | Performed by: PHYSICIAN ASSISTANT

## 2022-05-05 PROCEDURE — 83735 ASSAY OF MAGNESIUM: CPT | Performed by: PHYSICIAN ASSISTANT

## 2022-05-05 PROCEDURE — 99217 PR OBSERVATION CARE DISCHARGE: ICD-10-PCS | Mod: ,,, | Performed by: PHYSICIAN ASSISTANT

## 2022-05-05 PROCEDURE — G0378 HOSPITAL OBSERVATION PER HR: HCPCS

## 2022-05-05 PROCEDURE — 80048 BASIC METABOLIC PNL TOTAL CA: CPT | Performed by: PHYSICIAN ASSISTANT

## 2022-05-05 RX ORDER — TRAMADOL HYDROCHLORIDE 50 MG/1
50 TABLET ORAL EVERY 8 HOURS PRN
Status: DISCONTINUED | OUTPATIENT
Start: 2022-05-05 | End: 2022-05-05 | Stop reason: HOSPADM

## 2022-05-05 RX ORDER — GUAIFENESIN 100 MG/5ML
200 SOLUTION ORAL 3 TIMES DAILY PRN
COMMUNITY
End: 2024-03-14

## 2022-05-05 RX ORDER — LANOLIN ALCOHOL/MO/W.PET/CERES
400 CREAM (GRAM) TOPICAL DAILY
COMMUNITY

## 2022-05-05 RX ORDER — QUETIAPINE FUMARATE 25 MG/1
TABLET, FILM COATED ORAL
Qty: 90 TABLET | Refills: 1 | Status: SHIPPED | OUTPATIENT
Start: 2022-05-05 | End: 2022-07-25 | Stop reason: SDUPTHER

## 2022-05-05 RX ORDER — NIFEDIPINE 60 MG/1
60 TABLET, EXTENDED RELEASE ORAL DAILY
Qty: 90 TABLET | Refills: 3 | Status: SHIPPED | OUTPATIENT
Start: 2022-05-05 | End: 2022-07-25 | Stop reason: SDUPTHER

## 2022-05-05 RX ORDER — MULTIVITAMIN
1 TABLET ORAL DAILY
COMMUNITY
End: 2022-06-23

## 2022-05-05 RX ORDER — DOCUSATE SODIUM 100 MG/1
100 CAPSULE, LIQUID FILLED ORAL DAILY PRN
COMMUNITY
End: 2022-06-23

## 2022-05-05 RX ADMIN — NIFEDIPINE 60 MG: 60 TABLET, FILM COATED, EXTENDED RELEASE ORAL at 08:05

## 2022-05-05 RX ADMIN — QUETIAPINE FUMARATE 12.5 MG: 25 TABLET, FILM COATED ORAL at 12:05

## 2022-05-05 NOTE — ASSESSMENT & PLAN NOTE
- Per , patient has had a cognitive decline over the last few years but has refused to see a provider  - Oriented to person and place only  - Started trial of seroquel 12.5 mg QHS with good improvement  - Would benefit from neurology referral  - delirium precautions in place  - discharged on seroquel 12.5 mg daily  - referral to neurology given

## 2022-05-05 NOTE — HOSPITAL COURSE
Mrs. Bates was admitted to observation for hypertensive encephalopathy. Patient restarted on home nifedipine for which she had been non-compliant with good control and resolution of symptoms.  reports worsening memory difficulties and symptoms consistent with sundowning, patient started on low dose seroquel with improvement. Patient discharged on nifedipine 60 mg daily, seroquel 12.5 mg qhs. Referral to PCP, Rhematology (for fibromyalgia), and neurology given. Patient and  verbalized understanding. All questions answered.

## 2022-05-05 NOTE — ASSESSMENT & PLAN NOTE
Medication Noncompliance  HTN  - /86 on arrival with slurred speech and AMS  -  reports patient is not compliant with Nifedipine 60mg daily  - Also has irbesartan on her MAR, but  states she is not on it  - CTH with no acute intraparenchymal hemorrhage or major vascular distribution infarction  - MRI brain with allowing for patient motion artifact, no acute intracranial abnormality identified on today's exam.  Specifically, no evidence of acute infarct or enhancing lesion. Generalized cerebral volume loss with compensatory prominence of cerebral sulci, extra-axial spaces and ventricular system.  Minimal periventricular chronic microvascular ischemic change  - Given Labetalol 10mg IV and home nifedipine 60mg in ED with improvement of BP to 163/75  - Resolution of slurred speech and  reports patient's AMS has improved  - Restarted home Nifedipine 60mg daily for now  - Echo pending as last one was in 2016  - Patient and  educated on importance of medication compliance,  verbalized understanding  - cardiac diet  - tele

## 2022-05-05 NOTE — HPI
"Mamta Bates is 77 y.o. female with PMHx of fibromyalgia and HTN admitted to hospital medicine for acute encephalopathy 2/2 hypertensive urgency. Patient is a poor historian so majority of history taken from ED note and  who is at bedside.  reports that patient has been intermittently altered for several weeks/months, but this today he noted that patient had slurred speech and was "wearing her brassiere as underwear". He believes that she has signs of dementia, and has attempted to get patient to see a PCP or neurologist but patient continues to refuse. Endorses intermittent hallucinations, mainly at night. At the time of my interview, patient was very pleasant and cooperative with exam though not answering all questions appropriately and had some tangential speech. Her only complaint is chronic bilateral leg pain. Denies fever/chills, CP, SOB, abdominal pain, n/v, dysuria, diarrhea, constipation, numbness/tingling.    In the ED, patient hypertensive to max 194/85. Remaining vitals stable. Labs are largely unremarkable. Utox negative. UA noninfectious. Noted to have slurred speech in ED with no neuro deficits. CTH and MRI brain with no evidence of acute intracranial abnormalities. Mild generalized cerebral volume loss and chronic ischemic microvascular changes noted. Given tylenol x1 and labetalol 10mg IV x 1 with improvement of BP to 163/75 and resolution of slurred speech.   "

## 2022-05-05 NOTE — ASSESSMENT & PLAN NOTE
Medication Noncompliance  HTN  - /86 on arrival with slurred speech and AMS  -  reports patient is not compliant with Nifedipine 60mg daily  - Also has irbesartan on her MAR, but  states she is not on it  - CTH with no acute intraparenchymal hemorrhage or major vascular distribution infarction  - MRI brain with allowing for patient motion artifact, no acute intracranial abnormality identified on today's exam.  Specifically, no evidence of acute infarct or enhancing lesion. Generalized cerebral volume loss with compensatory prominence of cerebral sulci, extra-axial spaces and ventricular system.  Minimal periventricular chronic microvascular ischemic change  - Given Labetalol 10mg IV and home nifedipine 60mg in ED with improvement of BP to 163/75  - Resolution of slurred speech and  reports patient's AMS has improved  - Restarted home Nifedipine 60mg daily for now  - Echo pending as last one was in 2016, EF 65%, normal diastolic function  - Patient and  educated on importance of medication compliance,  verbalized understanding  - cardiac diet  - tele  - referral to internal medicine given

## 2022-05-05 NOTE — ASSESSMENT & PLAN NOTE
- Per , patient has had a cognitive decline over the last few years but has refused to see a provider  - Oriented to person and place only  - Started trial of seroquel 12.5 mg QHS  - Would benefit from neurology referral  - delirium precautions in place

## 2022-05-05 NOTE — PHARMACY MED REC
"Admission Medication History     The home medication history was taken by Lindsey Odell.    You may go to "Admission" then "Reconcile Home Medications" tabs to review and/or act upon these items.      The home medication list has been updated by the Pharmacy department.    Please read ALL comments highlighted in yellow.    Please address this information as you see fit.     Feel free to contact us if you have any questions or require assistance.      The medications listed below were removed from the home medication list. Please reorder if appropriate:  Patient reports no longer taking the following medication(s):   CYANOCOBALAMIN 1,000 MCG/ML INJ   DULOXETINE 30 MG   IRBESARTAN 150 MG   NALOXEGOL 25 MG   NIFEDIPINE XL 60 MG   TIZANIDINE 2 MG   TRAZODONE 50 MG      Medications listed below were obtained from: Patient/family  Current Outpatient Medications on File Prior to Encounter   Medication Sig    aspirin/salicylamide/caffeine (BC HEADACHE POWDER ORAL)   Take 1 Package by mouth 2 (two) times a day.    docusate sodium (STOOL SOFTENER) 100 MG capsule   Take 100 mg by mouth daily as needed for Constipation.    guaiFENesin 100 mg/5 ml (ROBITUSSIN) 100 mg/5 mL syrup   Take 200 mg by mouth 3 (three) times daily as needed for Cough.    magnesium oxide (MAG-OX) 400 mg (241.3 mg   magnesium) tablet   Take 400 mg by mouth once daily.    multivitamin (THERAGRAN) per tablet   Take 1 tablet by mouth once daily.    tramadol (ULTRAM) 50 mg tablet Take 50 mg by mouth every 6 (six) hours as needed for Pain.           Potential issues to be addressed PRIOR TO DISCHARGE  Please discuss with the patient barriers to adherence with medication treatment plans    Lindsey Odell  EXT [phone]                    .          "

## 2022-05-05 NOTE — ED NOTES
"Pt refuses to let staff put cardiac monitor on. Pt states, "I'm sleeping, don't put all that on me now! Maybe later"   "

## 2022-05-05 NOTE — SUBJECTIVE & OBJECTIVE
Past Medical History:   Diagnosis Date    Fibromyalgia     Hypertension        Past Surgical History:   Procedure Laterality Date    HYSTERECTOMY         Review of patient's allergies indicates:  No Known Allergies    No current facility-administered medications on file prior to encounter.     Current Outpatient Medications on File Prior to Encounter   Medication Sig    cyanocobalamin 1,000 mcg/mL injection Inject 1 mL (1,000 mcg total) into the skin once a week.    DULoxetine (CYMBALTA) 30 MG capsule Take 1 capsule (30 mg total) by mouth once daily.    irbesartan (AVAPRO) 150 MG tablet Take 1 tablet (150 mg total) by mouth every evening.    naloxegoL (MOVANTIK) 25 mg tablet Take 25 mg by mouth once as needed.    NIFEdipine (PROCARDIA-XL) 60 MG (OSM) 24 hr tablet Take 1 tablet (60 mg total) by mouth once daily.    tizanidine (ZANAFLEX) 2 MG tablet Take 4 mg by mouth every 8 (eight) hours as needed.    tramadol (ULTRAM) 50 mg tablet Take 50 mg by mouth every 6 (six) hours as needed for Pain.    trazodone (DESYREL) 50 MG tablet Take 50 mg by mouth nightly as needed for Insomnia.     Family History       Problem Relation (Age of Onset)    Heart disease Mother          Tobacco Use    Smoking status: Never Smoker    Smokeless tobacco: Never Used   Substance and Sexual Activity    Alcohol use: No    Drug use: No    Sexual activity: Yes     Partners: Male     Review of Systems   Constitutional:  Negative for activity change, chills and fever.   HENT:  Negative for mouth sores, sinus pressure and trouble swallowing.    Eyes:  Negative for photophobia and visual disturbance.   Respiratory:  Negative for cough, chest tightness and shortness of breath.    Cardiovascular:  Negative for chest pain, palpitations and leg swelling.   Gastrointestinal:  Negative for abdominal pain, constipation, diarrhea, nausea and vomiting.   Genitourinary:  Negative for dysuria, frequency and hematuria.   Musculoskeletal:  Negative for back pain,  gait problem and neck pain.        + bilateral leg pain   Skin:  Negative for rash and wound.   Neurological:  Positive for speech difficulty (resolved). Negative for dizziness, syncope and light-headedness.   Psychiatric/Behavioral:  Positive for confusion. Negative for agitation. The patient is not nervous/anxious.    Objective:     Vital Signs (Most Recent):  Temp: 98.1 °F (36.7 °C) (05/04/22 1640)  Pulse: 62 (05/05/22 0000)  Resp: 17 (05/05/22 0000)  BP: (!) 168/77 (05/05/22 0000)  SpO2: 98 % (05/05/22 0000)   Vital Signs (24h Range):  Temp:  [98.1 °F (36.7 °C)] 98.1 °F (36.7 °C)  Pulse:  [60-84] 62  Resp:  [17-18] 17  SpO2:  [97 %-98 %] 98 %  BP: (163-197)/(75-97) 168/77     Weight: 62.6 kg (138 lb)  Body mass index is 23.69 kg/m².    Physical Exam  Vitals and nursing note reviewed.   Constitutional:       General: She is not in acute distress.     Appearance: Normal appearance. She is not ill-appearing.   HENT:      Head: Normocephalic and atraumatic.      Right Ear: External ear normal.      Left Ear: External ear normal.   Eyes:      Extraocular Movements: Extraocular movements intact.      Conjunctiva/sclera: Conjunctivae normal.      Pupils: Pupils are equal, round, and reactive to light.   Cardiovascular:      Rate and Rhythm: Normal rate and regular rhythm.      Pulses: Normal pulses.      Heart sounds: Normal heart sounds. No murmur heard.  Pulmonary:      Effort: Pulmonary effort is normal. No respiratory distress.      Breath sounds: Normal breath sounds.   Abdominal:      General: Abdomen is flat. Bowel sounds are normal.      Palpations: Abdomen is soft.      Tenderness: There is no abdominal tenderness.   Musculoskeletal:         General: Normal range of motion.      Cervical back: Normal range of motion and neck supple. No tenderness.      Right lower leg: No edema.      Left lower leg: No edema.   Skin:     General: Skin is warm and dry.      Capillary Refill: Capillary refill takes less than 2  seconds.      Coloration: Skin is not jaundiced.   Neurological:      General: No focal deficit present.      Mental Status: She is alert.      Comments: Oriented to person and place only. Similar to baseline per .    Psychiatric:         Mood and Affect: Mood normal.         Behavior: Behavior normal.         CRANIAL NERVES     CN III, IV, VI   Pupils are equal, round, and reactive to light.     Significant Labs: All pertinent labs within the past 24 hours have been reviewed.  BMP:   Recent Labs   Lab 05/04/22  1806   GLU 85      K 3.9      CO2 24   BUN 11   CREATININE 0.8   CALCIUM 9.6   MG 2.2     CBC:   Recent Labs   Lab 05/04/22  1806   WBC 5.66   HGB 11.6*   HCT 36.2*        CMP:   Recent Labs   Lab 05/04/22  1806      K 3.9      CO2 24   GLU 85   BUN 11   CREATININE 0.8   CALCIUM 9.6   PROT 7.1   ALBUMIN 3.9   BILITOT 1.0   ALKPHOS 86   AST 22   ALT 14   ANIONGAP 7*   EGFRNONAA >60.0       Magnesium:   Recent Labs   Lab 05/04/22  1806   MG 2.2     Urine Studies:   Recent Labs   Lab 05/04/22  1855   COLORU Colorless*   APPEARANCEUA Clear   PHUR 6.0   SPECGRAV 1.005   PROTEINUA Negative   GLUCUA Negative   KETONESU Negative   BILIRUBINUA Negative   OCCULTUA Negative   NITRITE Negative   LEUKOCYTESUR 1+*   RBCUA 2   WBCUA 1   SQUAMEPITHEL 0       Significant Imaging: I have reviewed all pertinent imaging results/findings within the past 24 hours.

## 2022-05-05 NOTE — ED NOTES
Adult Physical Assessment  LOC: Mamta Bates, 77 y.o. female verified via two identifiers.  The patient is awake, alert, oriented and speaking appropriately at this time.  APPEARANCE: Patient resting comfortably and appears to be in no acute distress at this time. Patient is clean and well groomed, patient's clothing is properly fastened.  SKIN:The skin is warm and dry, color consistent with ethnicity, patient has normal skin turgor and moist mucus membranes, skin intact, no breakdown or brusing noted.  MUSCULOSKELETAL: Patient moving all extremities well, no obvious swelling or deformities noted.  RESPIRATORY: Airway is open and patent, respirations are spontaneous, patient has a normal effort and rate, no accessory muscle use noted.  CARDIAC: Patient has a normal rate and rhythm, no periphreal edema noted in any extremity, capillary refill < 3 seconds in all extremities  ABDOMEN: Soft and non tender to palpation, no abdominal distention noted. Bowel sounds present in all four quadrants.  NEUROLOGIC: Eyes open spontaneously,  follows commands, facial expression symmetrical, bilateral hand grasp equal and even, purposeful motor response noted, normal sensation in all extremities when touched with a finger.  Pt talking rapidly, not able to stay on topic

## 2022-05-05 NOTE — PLAN OF CARE
Initial Discharge Planning Case Management Assessment:     Patient admitted on 5/5/22    PCP updated in Epic: No current PCP. PCP on file is her cardiologist who she hasn't seen in over a year.   Baseline functioning: Independent  Recent changes from baseline: Some changes to mental status, odd bx. Independent in ADLs  HH/community service hx: None  Hx facility placement: None  DME: None  Psychosocial: Pt lives with . Strong support system.  reports changes in pt's mental status recently and odd bx, concern for dementia. Pt has been refusing to go to doctor's appointments. Pt appears oriented upon assessment with slightly hyperverbal and tangential speech, but is redirectable. Pt does not display insight into reason for ED visit.   Living environment: Pt lives with . No concerns noted about home environment.     Current dispo: Home w/outpatient f/u  Barriers to d/c: Scheduling outpatient f/u  Transportation:  to provide    Consults following: Case Management  Referrals sent: None at this time    Actions completed today:   -Chart reviewed. Care plan discussed with pt, pt's , care team  -SU met with pt and  at bedside in EDOU to complete initial assessment and discuss d/c planning.   -Pt states she will go to whatever appointments the doctor recommends. SW informed pt that f/u with neurology and rheumatology is what is being recommended as well as establishing a PCP. Pt is agreeable.   -Pt's  states he has a PCP at [facility] across the street and would prefer pt get a PCP established there. They are okay with soonest available appts for neuro and rheumatology.   - SU scheduled pt for PCP appt on 5/11 at 1pm. Contacted neurology clinic, no available appointments, pt placed on cancellation list and they will call pt for scheduling. Contacted [facility] rheumatology clinic, their RN will contact pt for scheduling, unable to book appt with SW on phone.   - SW informed pt and   about appointments and f/u. No other needs expressed at this time.     SW will continue to monitor and assist with any additional d/c needs as they arise.     Karishma Morris LMSW  ED   Care Management  [facility]  Ext. [phone]    [facility] - Emergency Dept  Initial Discharge Assessment       Primary Care Provider: Jagjit Potter MD    Admission Diagnosis: Change in mental status [R41.82]    Admission Date: 5/4/2022  Expected Discharge Date: 5/5/2022    Discharge Barriers Identified: None    Payor: MEDICARE / Plan: MEDICARE PART A & B / Product Type: Government /     Extended Emergency Contact Information  Primary Emergency Contact: Noe Mcmanus   [facility]  Home Phone: [phone]  Relation: Spouse    Discharge Plan A: Home with family         [facility] - [place], [place] - [address]  [address] [facility] [address]  Phone: [phone] Fax: [phone]    [facility] - [address] [address]  Phone: [phone] Fax: [phone]    [id number] - [place] [id number] [facility] AT [address] [address]  Phone: [phone] Fax: [phone]      Initial Assessment (most recent)     Adult Discharge Assessment - 05/05/22 1334        Discharge Assessment    Assessment Type Discharge Planning Assessment     Confirmed/corrected address, phone number and insurance Yes     Confirmed Demographics Correct on Facesheet     Source of Information patient;family     When was your last doctors appointment? --   ~ 1 yr ago    Does patient/caregiver understand observation status Yes     Communicated SON with patient/caregiver Yes     Reason For Admission Change in mental status     Lives With alone;spouse     Do you expect to return to your current living situation? Yes     Do you have help at home or  someone to help you manage your care at home? Yes     Who are your caregiver(s) and their phone number(s)? -Noe Mcmanus, [phone]     Prior to hospitilization cognitive status: Alert/Oriented   Odd bx reported by     Current cognitive status: Alert/Oriented     Walking or Climbing Stairs Difficulty none     Dressing/Bathing Difficulty none     Home Accessibility wheelchair accessible     Equipment Currently Used at Home none     Readmission within 30 days? No     Patient currently being followed by outpatient case management? No     Do you currently have service(s) that help you manage your care at home? No     Do you take prescription medications? Yes     Do you have prescription coverage? Yes     Coverage Medicare     Do you have any problems affording any of your prescribed medications? No     Is the patient taking medications as prescribed? yes     Who is going to help you get home at discharge? Noe Willett     How do you get to doctors appointments? family or friend will provide     Are you on dialysis? No     Do you take coumadin? No     Discharge Plan A Home with family     DME Needed Upon Discharge  none     Discharge Plan discussed with: Spouse/sig other;Patient     Discharge Barriers Identified None        Relationship/Environment    Name(s) of Who Lives With Patient Noe Willett

## 2022-05-05 NOTE — DISCHARGE SUMMARY
"[facility] - Emergency Dept  [facility] Medicine  Discharge Summary      Patient Name: Mamta Bates  MRN: [id number]  Patient Class: OP- Observation  Admission Date: 5/4/2022  Hospital Length of Stay: 0 days  Discharge Date and Time: 5/5/2022  2:10 PM  Attending Physician: Rita West MD   Discharging Provider: Loree Naranjo PA-C  Primary Care Provider: Jagjit Potter MD  Hospital Medicine Team: [facility]OS[facility] MED E Loree Naranjo PA-C    HPI:   Mamta Bates is 77 y.o. female with PMHx of fibromyalgia and HTN admitted to hospital medicine for acute encephalopathy 2/2 hypertensive urgency. Patient is a poor historian so majority of history taken from ED note and  who is at bedside.  reports that patient has been intermittently altered for several weeks/months, but this today he noted that patient had slurred speech and was "wearing her brassiere as underwear". He believes that she has signs of dementia, and has attempted to get patient to see a PCP or neurologist but patient continues to refuse. Endorses intermittent hallucinations, mainly at night. At the time of my interview, patient was very pleasant and cooperative with exam though not answering all questions appropriately and had some tangential speech. Her only complaint is chronic bilateral leg pain. Denies fever/chills, CP, SOB, abdominal pain, n/v, dysuria, diarrhea, constipation, numbness/tingling.    In the ED, patient hypertensive to max 194/85. Remaining vitals stable. Labs are largely unremarkable. Utox negative. UA noninfectious. Noted to have slurred speech in ED with no neuro deficits. CTH and MRI brain with no evidence of acute intracranial abnormalities. Mild generalized cerebral volume loss and chronic ischemic microvascular changes noted. Given tylenol x1 and labetalol 10mg IV x 1 with improvement of BP to 163/75 and resolution of slurred speech.       * No surgery found *      Hospital Course:   Mrs. Bates was admitted to " observation for hypertensive encephalopathy. Patient restarted on home nifedipine for which she had been non-compliant with good control and resolution of symptoms.  reports worsening memory difficulties and symptoms consistent with sundowning, patient started on low dose seroquel with improvement. Patient discharged on nifedipine 60 mg daily, seroquel 12.5 mg qhs. Referral to PCP, Rhematology (for fibromyalgia), and neurology given. Patient and  verbalized understanding. All questions answered.        Goals of Care Treatment Preferences:  Code Status: Full Code      Consults:     * Hypertensive encephalopathy  Medication Noncompliance  HTN  - /86 on arrival with slurred speech and AMS  -  reports patient is not compliant with Nifedipine 60mg daily  - Also has irbesartan on her MAR, but  states she is not on it  - CTH with no acute intraparenchymal hemorrhage or major vascular distribution infarction  - MRI brain with allowing for patient motion artifact, no acute intracranial abnormality identified on today's exam.  Specifically, no evidence of acute infarct or enhancing lesion. Generalized cerebral volume loss with compensatory prominence of cerebral sulci, extra-axial spaces and ventricular system.  Minimal periventricular chronic microvascular ischemic change  - Given Labetalol 10mg IV and home nifedipine 60mg in ED with improvement of BP to 163/75  - Resolution of slurred speech and  reports patient's AMS has improved  - Restarted home Nifedipine 60mg daily for now  - Echo pending as last one was in 2016, EF 65%, normal diastolic function  - Patient and  educated on importance of medication compliance,  verbalized understanding  - cardiac diet  - tele  - referral to internal medicine given    Cognitive impairment  - Per , patient has had a cognitive decline over the last few years but has refused to see a provider  - Oriented to person and place  only  - Started trial of seroquel 12.5 mg QHS with good improvement  - Would benefit from neurology referral  - delirium precautions in place  - discharged on seroquel 12.5 mg daily  - referral to neurology given      Final Active Diagnoses:    Diagnosis Date Noted POA    PRINCIPAL PROBLEM:  Hypertensive encephalopathy [I67.4] 05/04/2022 Yes    Cognitive impairment [R41.89] 05/05/2022 Yes    Medication noncompliance due to cognitive impairment [Z91.14] 05/05/2022 Not Applicable    Fibromyalgia [M79.7] 08/18/2015 Yes    HLD (hyperlipidemia) [E78.5] 04/17/2015 Yes    HTN (hypertension) [I10] 04/15/2015 Yes      Problems Resolved During this Admission:       Discharged Condition: good    Disposition:     Follow Up:    Patient Instructions:      Ambulatory referral/consult to Neurology   Standing Status: Future   Referral Priority: Routine Referral Type: Consultation   Referral Reason: Specialty Services Required   Requested Specialty: Neurology   Number of Visits Requested: 1     Ambulatory referral/consult to Internal Medicine   Standing Status: Future   Referral Priority: Routine Referral Type: Consultation   Referral Reason: Specialty Services Required   Requested Specialty: Internal Medicine   Number of Visits Requested: 1     Ambulatory referral/consult to Rheumatology   Standing Status: Future   Referral Priority: Routine Referral Type: Consultation   Referral Reason: Specialty Services Required   Requested Specialty: Rheumatology   Number of Visits Requested: 1       Significant Diagnostic Studies: Labs:   CMP   Recent Labs   Lab 05/04/22 1806 05/05/22  0335    140   K 3.9 3.5    113*   CO2 24 20*   GLU 85 84   BUN 11 9   CREATININE 0.8 0.7   CALCIUM 9.6 8.7   PROT 7.1  --    ALBUMIN 3.9  --    BILITOT 1.0  --    ALKPHOS 86  --    AST 22  --    ALT 14  --    ANIONGAP 7* 7*   ESTGFRAFRICA >60.0 >60.0   EGFRNONAA >60.0 >60.0   , CBC   Recent Labs   Lab 05/04/22 1806 05/05/22  0335   WBC 5.66 5.58    HGB 11.6* 10.3*   HCT 36.2* 33.2*    219    and Lipid Panel   Lab Results   Component Value Date    CHOL 189 05/05/2022    HDL 52 05/05/2022    LDLCALC 122.0 05/05/2022    TRIG 75 05/05/2022    CHOLHDL 27.5 05/05/2022     Cardiac Graphics: Echocardiogram:   Transthoracic echo (TTE) complete (Cupid Only):   Results for orders placed or performed during the hospital encounter of 05/04/22   Echo   Result Value Ref Range    Ascending aorta 3.02 cm    STJ 3.00 cm    AV mean gradient 4 mmHg    Ao peak prabhjot 1.29 m/s    Ao VTI 23.14 cm    IVS 0.81 0.6 - 1.1 cm    LA size 2.75 cm    Left Atrium Major Axis 4.55 cm    Left Atrium Minor Axis 4.18 cm    LVIDd 3.36 (A) 3.5 - 6.0 cm    LVIDs 2.45 2.1 - 4.0 cm    LVOT diameter 1.88 cm    LVOT peak VTI 18.09 cm    Posterior Wall 0.87 0.6 - 1.1 cm    MV Peak A Prabhjot 0.97 m/s    E wave deceleration time 249.70 msec    MV Peak E Prabhjot 0.86 m/s    RA Major Axis 4.20 cm    RA Width 2.87 cm    RVDD 2.95 cm    Sinus 2.79 cm    TAPSE 1.76 cm    TR Max Prabhjot 2.58 m/s    TDI LATERAL 0.08 m/s    TDI SEPTAL 0.07 m/s    LA WIDTH 2.98 cm    MV stenosis pressure 1/2 time 72.41 ms    LV Diastolic Volume 46.15 mL    LV Systolic Volume 21.31 mL    LVOT peak prabhjot 1.09 m/s    LV LATERAL E/E' RATIO 10.75 m/s    LV SEPTAL E/E' RATIO 12.29 m/s    FS 27 %    LA volume 30.35 cm3    LV mass 75.56 g    Left Ventricle Relative Wall Thickness 0.52 cm    AV valve area 2.17 cm2    AV Velocity Ratio 0.84     AV index (prosthetic) 0.78     MV valve area p 1/2 method 3.04 cm2    E/A ratio 0.89     Mean e' 0.08 m/s    LVOT area 2.8 cm2    LVOT stroke volume 50.19 cm3    AV peak gradient 7 mmHg    E/E' ratio 11.47 m/s    LV Systolic Volume Index 12.8 mL/m2    LV Diastolic Volume Index 27.63 mL/m2    LA Volume Index 18.2 mL/m2    LV Mass Index 45 g/m2    Triscuspid Valve Regurgitation Peak Gradient 27 mmHg    BSA 1.68 m2    EF 65 %    Right Atrial Pressure (from IVC) 8 mmHg    RV S' 13 cm/s    TV rest pulmonary artery  pressure 35 mmHg    Narrative    · The left ventricle is normal in size with concentric remodeling and   normal systolic function.  · The estimated ejection fraction is 65%.  · Normal left ventricular diastolic function.  · Normal right ventricular size with normal right ventricular systolic   function.  · Intermediate central venous pressure (8 mmHg).  · The estimated PA systolic pressure is 35 mmHg.          Pending Diagnostic Studies:       Procedure Component Value Units Date/Time    Hepatitis C Antibody [[id number]] Collected: 05/04/22 1806    Order Status: Sent Lab Status: In process Updated: 05/04/22 1815    Specimen: Blood            Medications:  Reconciled Home Medications:      Medication List        START taking these medications      QUEtiapine 25 MG Tab  Commonly known as: SEROQUEL  Take 1/2 tablet by mouth every evening            CONTINUE taking these medications      BC HEADACHE POWDER ORAL  Take 1 Package by mouth 2 (two) times a day.     guaiFENesin 100 mg/5 ml 100 mg/5 mL syrup  Commonly known as: ROBITUSSIN  Take 200 mg by mouth 3 (three) times daily as needed for Cough.     magnesium oxide 400 mg (241.3 mg magnesium) tablet  Commonly known as: MAG-OX  Take 400 mg by mouth once daily.     multivitamin per tablet  Commonly known as: THERAGRAN  Take 1 tablet by mouth once daily.     NIFEdipine 60 MG (OSM) 24 hr tablet  Commonly known as: PROCARDIA-XL  Take 1 tablet (60 mg total) by mouth once daily.     STOOL SOFTENER 100 MG capsule  Generic drug: docusate sodium  Take 100 mg by mouth daily as needed for Constipation.     traMADoL 50 mg tablet  Commonly known as: ULTRAM  Take 50 mg by mouth every 6 (six) hours as needed for Pain.              Indwelling Lines/Drains at time of discharge:   Lines/Drains/Airways       None                   Time spent on the discharge of patient: 36 minutes         Loree Naranjo PA-C  Department of Hospital Medicine  [facility] - Emergency Dept

## 2022-05-05 NOTE — H&P
"[facility] - Emergency Dept  [facility] Medicine  History & Physical    Patient Name: Mamta Bates  MRN: [id number]  Patient Class: OP- Observation  Admission Date: 5/4/2022  Attending Physician: Arcadio Arredondo MD   Primary Care Provider: Jagjit Potter MD         Patient information was obtained from patient, spouse/SO, past medical records and ER records.     Subjective:     Principal Problem:Hypertensive encephalopathy    Chief Complaint:   Chief Complaint   Patient presents with    Multiple complaints     Per ,?dementia, seeing lights, more slurred speech, pain to both lower legs, dr rodriguez spoke with pt and         HPI: Mamta Bates is 77 y.o. female with PMHx of fibromyalgia and HTN admitted to hospital medicine for acute encephalopathy 2/2 hypertensive urgency. Patient is a poor historian so majority of history taken from ED note and  who is at bedside.  reports that patient has been intermittently altered for several weeks/months, but this today he noted that patient had slurred speech and was "wearing her brassiere as underwear". He believes that she has signs of dementia, and has attempted to get patient to see a PCP or neurologist but patient continues to refuse. Endorses intermittent hallucinations, mainly at night. At the time of my interview, patient was very pleasant and cooperative with exam though not answering all questions appropriately and had some tangential speech. Her only complaint is chronic bilateral leg pain. Denies fever/chills, CP, SOB, abdominal pain, n/v, dysuria, diarrhea, constipation, numbness/tingling.    In the ED, patient hypertensive to max 194/85. Remaining vitals stable. Labs are largely unremarkable. Utox negative. UA noninfectious. Noted to have slurred speech in ED with no neuro deficits. CTH and MRI brain with no evidence of acute intracranial abnormalities. Mild generalized cerebral volume loss and chronic ischemic microvascular changes " noted. Given tylenol x1 and labetalol 10mg IV x 1 with improvement of BP to 163/75 and resolution of slurred speech.       Past Medical History:   Diagnosis Date    Fibromyalgia     Hypertension        Past Surgical History:   Procedure Laterality Date    HYSTERECTOMY         Review of patient's allergies indicates:  No Known Allergies    No current facility-administered medications on file prior to encounter.     Current Outpatient Medications on File Prior to Encounter   Medication Sig    cyanocobalamin 1,000 mcg/mL injection Inject 1 mL (1,000 mcg total) into the skin once a week.    DULoxetine (CYMBALTA) 30 MG capsule Take 1 capsule (30 mg total) by mouth once daily.    irbesartan (AVAPRO) 150 MG tablet Take 1 tablet (150 mg total) by mouth every evening.    naloxegoL (MOVANTIK) 25 mg tablet Take 25 mg by mouth once as needed.    NIFEdipine (PROCARDIA-XL) 60 MG (OSM) 24 hr tablet Take 1 tablet (60 mg total) by mouth once daily.    tizanidine (ZANAFLEX) 2 MG tablet Take 4 mg by mouth every 8 (eight) hours as needed.    tramadol (ULTRAM) 50 mg tablet Take 50 mg by mouth every 6 (six) hours as needed for Pain.    trazodone (DESYREL) 50 MG tablet Take 50 mg by mouth nightly as needed for Insomnia.     Family History       Problem Relation (Age of Onset)    Heart disease Mother          Tobacco Use    Smoking status: Never Smoker    Smokeless tobacco: Never Used   Substance and Sexual Activity    Alcohol use: No    Drug use: No    Sexual activity: Yes     Partners: Male     Review of Systems   Constitutional:  Negative for activity change, chills and fever.   HENT:  Negative for mouth sores, sinus pressure and trouble swallowing.    Eyes:  Negative for photophobia and visual disturbance.   Respiratory:  Negative for cough, chest tightness and shortness of breath.    Cardiovascular:  Negative for chest pain, palpitations and leg swelling.   Gastrointestinal:  Negative for abdominal pain, constipation,  diarrhea, nausea and vomiting.   Genitourinary:  Negative for dysuria, frequency and hematuria.   Musculoskeletal:  Negative for back pain, gait problem and neck pain.        + bilateral leg pain   Skin:  Negative for rash and wound.   Neurological:  Positive for speech difficulty (resolved). Negative for dizziness, syncope and light-headedness.   Psychiatric/Behavioral:  Positive for confusion. Negative for agitation. The patient is not nervous/anxious.    Objective:     Vital Signs (Most Recent):  Temp: 98.1 °F (36.7 °C) (05/04/22 1640)  Pulse: 62 (05/05/22 0000)  Resp: 17 (05/05/22 0000)  BP: (!) 168/77 (05/05/22 0000)  SpO2: 98 % (05/05/22 0000)   Vital Signs (24h Range):  Temp:  [98.1 °F (36.7 °C)] 98.1 °F (36.7 °C)  Pulse:  [60-84] 62  Resp:  [17-18] 17  SpO2:  [97 %-98 %] 98 %  BP: (163-197)/(75-97) 168/77     Weight: 62.6 kg (138 lb)  Body mass index is 23.69 kg/m².    Physical Exam  Vitals and nursing note reviewed.   Constitutional:       General: She is not in acute distress.     Appearance: Normal appearance. She is not ill-appearing.   HENT:      Head: Normocephalic and atraumatic.      Right Ear: External ear normal.      Left Ear: External ear normal.   Eyes:      Extraocular Movements: Extraocular movements intact.      Conjunctiva/sclera: Conjunctivae normal.      Pupils: Pupils are equal, round, and reactive to light.   Cardiovascular:      Rate and Rhythm: Normal rate and regular rhythm.      Pulses: Normal pulses.      Heart sounds: Normal heart sounds. No murmur heard.  Pulmonary:      Effort: Pulmonary effort is normal. No respiratory distress.      Breath sounds: Normal breath sounds.   Abdominal:      General: Abdomen is flat. Bowel sounds are normal.      Palpations: Abdomen is soft.      Tenderness: There is no abdominal tenderness.   Musculoskeletal:         General: Normal range of motion.      Cervical back: Normal range of motion and neck supple. No tenderness.      Right lower leg: No  edema.      Left lower leg: No edema.   Skin:     General: Skin is warm and dry.      Capillary Refill: Capillary refill takes less than 2 seconds.      Coloration: Skin is not jaundiced.   Neurological:      General: No focal deficit present.      Mental Status: She is alert.      Comments: Oriented to person and place only. Similar to baseline per .    Psychiatric:         Mood and Affect: Mood normal.         Behavior: Behavior normal.         CRANIAL NERVES     CN III, IV, VI   Pupils are equal, round, and reactive to light.     Significant Labs: All pertinent labs within the past 24 hours have been reviewed.  BMP:   Recent Labs   Lab 05/04/22  1806   GLU 85      K 3.9      CO2 24   BUN 11   CREATININE 0.8   CALCIUM 9.6   MG 2.2     CBC:   Recent Labs   Lab 05/04/22  1806   WBC 5.66   HGB 11.6*   HCT 36.2*        CMP:   Recent Labs   Lab 05/04/22  1806      K 3.9      CO2 24   GLU 85   BUN 11   CREATININE 0.8   CALCIUM 9.6   PROT 7.1   ALBUMIN 3.9   BILITOT 1.0   ALKPHOS 86   AST 22   ALT 14   ANIONGAP 7*   EGFRNONAA >60.0       Magnesium:   Recent Labs   Lab 05/04/22  1806   MG 2.2     Urine Studies:   Recent Labs   Lab 05/04/22  1855   COLORU Colorless*   APPEARANCEUA Clear   PHUR 6.0   SPECGRAV 1.005   PROTEINUA Negative   GLUCUA Negative   KETONESU Negative   BILIRUBINUA Negative   OCCULTUA Negative   NITRITE Negative   LEUKOCYTESUR 1+*   RBCUA 2   WBCUA 1   SQUAMEPITHEL 0       Significant Imaging: I have reviewed all pertinent imaging results/findings within the past 24 hours.    Assessment/Plan:     * Hypertensive encephalopathy  Medication Noncompliance  HTN  - /86 on arrival with slurred speech and AMS  -  reports patient is not compliant with Nifedipine 60mg daily  - Also has irbesartan on her MAR, but  states she is not on it  - CTH with no acute intraparenchymal hemorrhage or major vascular distribution infarction  - MRI brain with allowing  for patient motion artifact, no acute intracranial abnormality identified on today's exam.  Specifically, no evidence of acute infarct or enhancing lesion. Generalized cerebral volume loss with compensatory prominence of cerebral sulci, extra-axial spaces and ventricular system.  Minimal periventricular chronic microvascular ischemic change  - Given Labetalol 10mg IV and home nifedipine 60mg in ED with improvement of BP to 163/75  - Resolution of slurred speech and  reports patient's AMS has improved  - Restarted home Nifedipine 60mg daily for now  - Echo pending as last one was in 2016  - Patient and  educated on importance of medication compliance,  verbalized understanding  - cardiac diet  - tele    Cognitive impairment  - Per , patient has had a cognitive decline over the last few years but has refused to see a provider  - Oriented to person and place only  - Started trial of seroquel 12.5 mg QHS  - Would benefit from neurology referral  - delirium precautions in place    Fibromyalgia  - takes BC power daily and Ultram 50mg as needed  - Tylenol prn  - ordered Ultram 50mg q8h prn, but would use sparingly given AMS    HLD (hyperlipidemia)  - Listed on problem list, no statin on MAR  - check lipid panel in AM as patient has not seen PCP or cardiology since 2017      VTE Risk Mitigation (From admission, onward)         Ordered     enoxaparin injection 40 mg  Daily         05/04/22 2218     IP VTE HIGH RISK PATIENT  Once         05/04/22 2218     Place sequential compression device  Until discontinued         05/04/22 2218                   Shara Newman PA-C  Department of Hospital Medicine   [facility] - Emergency Dept

## 2022-05-05 NOTE — ASSESSMENT & PLAN NOTE
- Listed on problem list, no statin on MAR  - check lipid panel in AM as patient has not seen PCP or cardiology since 2017

## 2022-05-05 NOTE — TELEPHONE ENCOUNTER
----- Message from Zuhair Colunga sent at 5/5/2022  2:13 PM CDT -----  Regarding: referral in the chart    The caller- Celia Morris with [facility] the ED at [facility] states that the PA put in a referral for the Pt to be seen for- Fibromyalgia [M79.7]    Pt is discharging today.    Ph # [phone]

## 2022-05-05 NOTE — TELEPHONE ENCOUNTER
Called the patient to let her know that I have her scheduled with Dr Foy and Dr Crane but the phone just kept ringing. I scheduled her, placed her on the wait list and placed the reminder in the mail

## 2022-05-05 NOTE — ASSESSMENT & PLAN NOTE
- takes BC power daily and Ultram 50mg as needed  - Tylenol prn  - ordered Ultram 50mg q8h prn, but would use sparingly given AMS

## 2022-05-06 ENCOUNTER — TELEPHONE (OUTPATIENT)
Dept: NEUROLOGY | Facility: CLINIC | Age: 77
End: 2022-05-06
Payer: MEDICARE

## 2022-05-06 NOTE — TELEPHONE ENCOUNTER
----- Message from Mary Beth Mcginnis sent at 5/5/2022 12:36 PM CDT -----  [facility] calling regarding Appointment Access for Memory Disorders. Pt will discharge today..  They would like her to be seen ASAP.  call back [phone]

## 2022-05-06 NOTE — TELEPHONE ENCOUNTER
Pt recently D/C'd from hospital for HTN encephalopathy. Concern from pt's  for ongoing cognitive decline past couple of years but did not want to see a neurologist. Started on seroquel 12.5mg  QHS at D/C. CTH on file with mild volume loss per report.     Appt offered/accepted for 7/20/22 @ 10:30am with Dr. Javier at [facility], [address] [facility] clinic tower. Appt letter placed in the mail.

## 2022-05-09 LAB — HCV AB SERPL QL IA: NEGATIVE

## 2022-05-11 ENCOUNTER — LAB VISIT (OUTPATIENT)
Dept: LAB | Facility: HOSPITAL | Age: 77
End: 2022-05-11
Payer: MEDICARE

## 2022-05-11 ENCOUNTER — PATIENT MESSAGE (OUTPATIENT)
Dept: ADMINISTRATIVE | Facility: OTHER | Age: 77
End: 2022-05-11
Payer: MEDICARE

## 2022-05-11 ENCOUNTER — OFFICE VISIT (OUTPATIENT)
Dept: INTERNAL MEDICINE | Facility: CLINIC | Age: 77
End: 2022-05-11
Payer: MEDICARE

## 2022-05-11 VITALS
WEIGHT: 136 LBS | BODY MASS INDEX: 23.34 KG/M2 | DIASTOLIC BLOOD PRESSURE: 90 MMHG | SYSTOLIC BLOOD PRESSURE: 160 MMHG | HEART RATE: 86 BPM

## 2022-05-11 DIAGNOSIS — Z76.89 ENCOUNTER TO ESTABLISH CARE: ICD-10-CM

## 2022-05-11 DIAGNOSIS — E53.8 DEFICIENCY OF OTHER SPECIFIED B GROUP VITAMINS: ICD-10-CM

## 2022-05-11 DIAGNOSIS — I10 PRIMARY HYPERTENSION: Primary | ICD-10-CM

## 2022-05-11 DIAGNOSIS — R79.9 ABNORMAL FINDING OF BLOOD CHEMISTRY, UNSPECIFIED: ICD-10-CM

## 2022-05-11 DIAGNOSIS — E08.41 DIABETES MELLITUS DUE TO UNDERLYING CONDITION WITH DIABETIC MONONEUROPATHY: ICD-10-CM

## 2022-05-11 LAB
ESTIMATED AVG GLUCOSE: 94 MG/DL (ref 68–131)
FERRITIN SERPL-MCNC: 37 NG/ML (ref 20–300)
HBA1C MFR BLD: 4.9 % (ref 4–5.6)
IRON SERPL-MCNC: 60 UG/DL (ref 30–160)
SATURATED IRON: 14 % (ref 20–50)
TOTAL IRON BINDING CAPACITY: 432 UG/DL (ref 250–450)
TRANSFERRIN SERPL-MCNC: 292 MG/DL (ref 200–375)
TRANSFERRIN SERPL-MCNC: 292 MG/DL (ref 200–375)
VIT B12 SERPL-MCNC: 494 PG/ML (ref 210–950)

## 2022-05-11 PROCEDURE — 84466 ASSAY OF TRANSFERRIN: CPT | Performed by: STUDENT IN AN ORGANIZED HEALTH CARE EDUCATION/TRAINING PROGRAM

## 2022-05-11 PROCEDURE — 99203 PR OFFICE/OUTPT VISIT, NEW, LEVL III, 30-44 MIN: ICD-10-PCS | Mod: S$PBB,GC,, | Performed by: STUDENT IN AN ORGANIZED HEALTH CARE EDUCATION/TRAINING PROGRAM

## 2022-05-11 PROCEDURE — 82728 ASSAY OF FERRITIN: CPT | Performed by: STUDENT IN AN ORGANIZED HEALTH CARE EDUCATION/TRAINING PROGRAM

## 2022-05-11 PROCEDURE — 99999 PR PBB SHADOW E&M-EST. PATIENT-LVL II: ICD-10-PCS | Mod: PBBFAC,GC,, | Performed by: STUDENT IN AN ORGANIZED HEALTH CARE EDUCATION/TRAINING PROGRAM

## 2022-05-11 PROCEDURE — 36415 COLL VENOUS BLD VENIPUNCTURE: CPT | Performed by: STUDENT IN AN ORGANIZED HEALTH CARE EDUCATION/TRAINING PROGRAM

## 2022-05-11 PROCEDURE — 84425 ASSAY OF VITAMIN B-1: CPT | Performed by: STUDENT IN AN ORGANIZED HEALTH CARE EDUCATION/TRAINING PROGRAM

## 2022-05-11 PROCEDURE — 82607 VITAMIN B-12: CPT | Performed by: STUDENT IN AN ORGANIZED HEALTH CARE EDUCATION/TRAINING PROGRAM

## 2022-05-11 PROCEDURE — 83036 HEMOGLOBIN GLYCOSYLATED A1C: CPT | Performed by: STUDENT IN AN ORGANIZED HEALTH CARE EDUCATION/TRAINING PROGRAM

## 2022-05-11 PROCEDURE — 99212 OFFICE O/P EST SF 10 MIN: CPT | Mod: PBBFAC | Performed by: STUDENT IN AN ORGANIZED HEALTH CARE EDUCATION/TRAINING PROGRAM

## 2022-05-11 PROCEDURE — 99203 OFFICE O/P NEW LOW 30 MIN: CPT | Mod: S$PBB,GC,, | Performed by: STUDENT IN AN ORGANIZED HEALTH CARE EDUCATION/TRAINING PROGRAM

## 2022-05-11 PROCEDURE — 99999 PR PBB SHADOW E&M-EST. PATIENT-LVL II: CPT | Mod: PBBFAC,GC,, | Performed by: STUDENT IN AN ORGANIZED HEALTH CARE EDUCATION/TRAINING PROGRAM

## 2022-05-11 NOTE — PROGRESS NOTES
Name: Mamta Bates  : 1945  Date of Service: 2022     Reason for visit:   Chief Complaint   Patient presents with    Follow-up     Hospital     HPI:     Ms. Bates is a 77 year-old female with fibromyalgia and HTN who is presenting to clinic as a hospital follow-up appointment. She was recently hospitalized with encephalopathy secondary to a hypertensive urgency that resolved with re-initiation of her anti-hypertensives. She was discharged home on nifedipine and seroquel was started due to concerns of chronic changes in mentation. Since discharged home she has been doing well. Her  has been monitoring her blood pressure and has had to hold the nifedipine several times due to systolic blood pressure readings of 100. Per her , she has been having intermittent changes in mental status for the past months- he relayed this concern to the primary team while she was hospitalized and an appointment has been scheduled with neurology for further workup.     PMH:   Past Medical History:   Diagnosis Date    Fibromyalgia     Hypertension      Surgical History:   Past Surgical History:   Procedure Laterality Date    HYSTERECTOMY       Allergies:   Review of patient's allergies indicates:  No Known Allergies    Medications:     Current Outpatient Medications:     aspirin/salicylamide/caffeine (BC HEADACHE POWDER ORAL), Take 1 Package by mouth 2 (two) times a day., Disp: , Rfl:     docusate sodium (STOOL SOFTENER) 100 MG capsule, Take 100 mg by mouth daily as needed for Constipation., Disp: , Rfl:     guaiFENesin 100 mg/5 ml (ROBITUSSIN) 100 mg/5 mL syrup, Take 200 mg by mouth 3 (three) times daily as needed for Cough., Disp: , Rfl:     magnesium oxide (MAG-OX) 400 mg (241.3 mg magnesium) tablet, Take 400 mg by mouth once daily., Disp: , Rfl:     multivitamin (THERAGRAN) per tablet, Take 1 tablet by mouth once daily., Disp: , Rfl:     NIFEdipine (PROCARDIA-XL) 60 MG (OSM) 24 hr tablet, Take 1  tablet (60 mg total) by mouth once daily., Disp: 90 tablet, Rfl: 3    QUEtiapine (SEROQUEL) 25 MG Tab, Take 1/2 tablet by mouth every evening, Disp: 90 tablet, Rfl: 1    tramadol (ULTRAM) 50 mg tablet, Take 50 mg by mouth every 6 (six) hours as needed for Pain., Disp: , Rfl:     Family History:   Family History   Problem Relation Age of Onset    Heart disease Mother      Social History:   Social History     Socioeconomic History    Marital status:    Tobacco Use    Smoking status: Never Smoker    Smokeless tobacco: Never Used   Substance and Sexual Activity    Alcohol use: No    Drug use: No    Sexual activity: Yes     Partners: Male     Review of Systems:   Review of Systems   Constitutional: Negative for chills, fever, malaise/fatigue and weight loss.   HENT: Negative for congestion and sore throat.    Eyes: Negative for blurred vision, double vision and photophobia.   Respiratory: Negative for cough and sputum production.    Cardiovascular: Negative for chest pain and palpitations.   Gastrointestinal: Negative for constipation, diarrhea, nausea and vomiting.   Genitourinary: Negative for dysuria and frequency.   Skin: Negative for itching and rash.   Neurological: Negative for weakness and headaches.       Vitals:   Vitals:    05/11/22 1336   BP: (!) 160/90   Pulse: 86   Weight: 61.7 kg (136 lb)     Body mass index is 23.34 kg/m².    Physical Exam:   Physical Exam  Constitutional:       General: She is not in acute distress.     Appearance: Normal appearance. She is not ill-appearing.   HENT:      Head: Normocephalic and atraumatic.      Nose: No congestion or rhinorrhea.   Cardiovascular:      Rate and Rhythm: Normal rate and regular rhythm.   Pulmonary:      Effort: Pulmonary effort is normal.      Breath sounds: Normal breath sounds.   Abdominal:      General: Bowel sounds are normal. There is no distension.      Tenderness: There is no abdominal tenderness.   Musculoskeletal:         General:  No swelling.      Right lower leg: No edema.      Left lower leg: No edema.   Skin:     General: Skin is warm.      Coloration: Skin is not jaundiced.      Findings: No lesion.   Neurological:      General: No focal deficit present.      Mental Status: She is alert and oriented to person, place, and time.       Assessment/Plan:     Ms. Bates is a 77 year-old female with HTN and fibromyalgia presenting for a hospital follow-up appointment where she was admitted for hypertensive urgency.     -Has been doing well post-discharge  -Will enroll in digital HTN program  -Wishes to establish care with PCP-- ordered routine labs today, explained I'm finishing residency and cannot be her PCP, scheduled appt for 2 months from now (coinciding with neuro/rheum appts) for ongoing care with a primary care provider      Preventative Health:   -Will have appt scheduled with a provider to establish care  -At that time can discuss: screening colonoscopy, screening mammogram    Disposition: Follow-up in 2 months to establish care with a PCP    Discussed with Dr. Axel Carter MD  Internal Medicine PGY-III  [facility]

## 2022-05-17 LAB — VIT B1 BLD-MCNC: 44 UG/L (ref 38–122)

## 2022-07-11 ENCOUNTER — OUTPATIENT CASE MANAGEMENT (OUTPATIENT)
Dept: NEUROLOGY | Facility: CLINIC | Age: 77
End: 2022-07-11
Payer: MEDICARE

## 2022-07-11 DIAGNOSIS — R46.89 COGNITIVE AND BEHAVIORAL CHANGES: Primary | ICD-10-CM

## 2022-07-11 DIAGNOSIS — F02.80 FRONTOTEMPORAL LOBAR DEGENERATION: ICD-10-CM

## 2022-07-11 DIAGNOSIS — G31.09 FRONTOTEMPORAL LOBAR DEGENERATION: ICD-10-CM

## 2022-07-11 DIAGNOSIS — R41.89 COGNITIVE AND BEHAVIORAL CHANGES: Primary | ICD-10-CM

## 2022-07-11 PROCEDURE — 99358 PR PROLONGED SERV,NO CONTACT,1ST HR: ICD-10-PCS | Mod: S$PBB,,, | Performed by: PSYCHIATRY & NEUROLOGY

## 2022-07-11 PROCEDURE — 99358 PROLONG SERVICE W/O CONTACT: CPT | Mod: S$PBB,,, | Performed by: PSYCHIATRY & NEUROLOGY

## 2022-07-12 NOTE — PROGRESS NOTES
[facility]  Brain Health and Cognitive Disorders Program    PATIENT: Mamta Bates  DATE: 7/11/2022  MRN: [id number]  PRIMARY PROVIDER: Simran Carter MD    Future Appointments   Date Time Provider Department Center   7/20/2022 10:30 AM Jaison Javier MD [facility] [id number] [facility]   8/22/2022  9:00 AM Elisabeth Foy MD [facility] RHEUM [facility]     I reviewed old records and/or communicated with other professionals or the patient's family from 01:00 PM until 01:43 AM on 07/11/2022. This is directly related to a face-to-face visit encounter with the patient (Evaluation and Management service) conducted on 07/20/2022.    I reviewed the following documentation for a total of 43 minutes.     CPT codes for billing for prolonged evaluation and management service (non-face-to-face review of records or communications with patient's family or other medical professionals):   [address]  [facility] EMR records I reviewed include:     Relevant Background/Context   Known Relevant Genetics:  o There is no known relevant genetic testing available.   Known Relevant Family history:  o No Known Relevant Family History.  o The family denies a history of early/late onset cognitive impairment.  o The family denies a history of movement disorder (PD, PDD, tremor, etc).  o The family denies a history of motor neuron disease (ALS).  o The family denies a history of developmental learning disorder (Dyslexia, ADHD, ASD, etc.).  o The family denies a history of mood/substance abuse disorder (MDD, ANDREW, Schizophrenia, etc.).   Developmental/Milestones:  o The patient/family report no known birth complications or early life problems. The patient met all developmental milestones.   Learning Disorders:  o The patient/family report no signs or symptoms suggestive of developmental learning disorder.   Education:  o 12 years of formal education.  o HS.   Relevant Medical History:  o HTN  o Fibromyalgia   Relevant Exposure/Trauma to CNS:  o No  "History of Traumatic Brain Injury or Concussions  o No History of Chronic Mood Disorder  o No History of Chronic Stress  o No History of Chronic Substance Abuse  o No History of Malnutrition  o No History of Toxic Exposure     Neurocognitive Disorder Features   Onset/Duration:  o Unknown   First Symptom:  o Memory impairment   Progression:  o Gradually Progressive   Clinical Course:  o Hospitalization (05/04/2022)  - Type: Chart Review. The patient is a 77-year-old female past medical history fibromyalgia, hypertension, hysterectomy who presents with her  for concern for altered mental status including slurred speech, pain to lower extremities, seeing lights, and not making sense. Patient's her  reports that he believes that she has had some early signs of dementia at getting worse over the past few months however the patient does not agree to seek any medical care until today. He says it has been hard to convince her to seek any treatment. He states that sometimes she is hallucinating and sometimes she has garbled speech. He states today he left the house this morning at 9:00 a. M. To run errands. At that time she was not "normal" but that she was at her baseline. When he returned home about 2-1/2 hours ago he noted that she seemed to have slurred speech, reporting seeing lights, complaining severe pain to her legs.  o Electronic Medical Record (05/06/2022)  - Type: Chart Review. Pt recently D/C'd from hospital for HTN encephalopathy. Concern from pt's her  for ongoing cognitive decline past couple of years but did not want to see a neurologist. Started on seroquel 12. 5mg QHS at D/C. [facility] on file with mild volume loss per report.  o Primary Care Provider (05/11/2022)  - Type: Chart Review. Ms. Bates is a 77 year-old female with fibromyalgia and HTN who is presenting to clinic as a hospital follow-up appointment. She was recently hospitalized with encephalopathy secondary to a hypertensive " urgency that resolved with re-initiation of her anti-hypertensives. She was discharged home on nifedipine and seroquel was started due to concerns of chronic changes in mentation. Since discharged home she has been doing well. Her  has been monitoring her blood pressure and has had to hold the nifedipine several times due to systolic blood pressure readings of 100. Per her , she has been having intermittent changes in mental status for the past months- he relayed this concern to the primary team while she was hospitalized and an appointment has been scheduled with neurology for further workup.     Current Presentation   Recent/Interim History:  o Unknown   Unresolved Concern(s) reported by patient/family:  o Unknown          Neuroimaging:    MRI brain/head without contrast on 5/4/2022   Formal interpretation by Radiology:   Please note image quality is mildly degraded by patient motion artifact, most notably postcontrast images. There is no abnormal restricted diffusion to suggest acute infarction. There is generalized cerebral volume loss with compensatory prominence of the extra-axial spaces, cerebral sulci and ventricular system. The ventricles demonstrate no evidence of hydrocephalus or midline shift. Minimal periventricular T2/FLAIR hyperintensity which is nonspecific but likely reflect sequela of chronic microvascular ischemic change. There are no abnormal areas of gradient susceptibility to suggest parenchymal hemorrhage. No extra-axial hemorrhage or fluid collections. Following the administration of IV contrast, allowing for motion artifact, there are no definite pathologic foci of enhancement. The craniocervical junction and sellar regions are within normal limits.   Independently reviewed radiological imaging by Jaison Jackson MD. MPH. Behavioral Neurologist   T1: Mild-to-moderate frontal temporal lobar degeneration with regional atrophy most well appreciated in the anterior  cortices dorsal greater than ventral with thinning of the anterior cingulate sulci, atrophy of the medial prefrontal dorsal lateral prefrontal relatively mild atrophy of the orbital frontal cortex. Most atrophy appears to be the dorsal midline greater than lateral prefrontal cortex. In addition there is right greater than left anterior temporal atrophy with relative sparing of hippocampi however still mild bilateral hippocampal it atrophy. No significant posterior cortical atrophy. Generalized thinning of the anterior cingulate with sparing of the mid brain and pontine region.   T2/FLAIR: No Significant hyperintensities appreciated on MRI T2/FLAIR   DWI/ADC: No Significant DWI hyperintensities/hypointensities. No ADC correlation.   SWI/GRE: No Significant hypointensities to suggest cortical/subcortical hemosiderin deposition.   Impression: : Bilateral prefrontal cortical atrophy and right greater than left anterior temporal atrophy consistent with frontal temporal lobe degeneration. Sparing of the brainstem. No significant subcortical white matter disease. No significant posterior cortical atrophy.     Working Diagnosis/Differential   FTLD     Sincerely,  Jaison Javier MD. MPH.    Brain Health and Cognitive Disorders Program  [facility]

## 2022-07-20 ENCOUNTER — OFFICE VISIT (OUTPATIENT)
Dept: NEUROLOGY | Facility: CLINIC | Age: 77
End: 2022-07-20
Payer: MEDICARE

## 2022-07-20 ENCOUNTER — IMMUNIZATION (OUTPATIENT)
Dept: INTERNAL MEDICINE | Facility: CLINIC | Age: 77
End: 2022-07-20
Payer: MEDICARE

## 2022-07-20 ENCOUNTER — LAB VISIT (OUTPATIENT)
Dept: LAB | Facility: HOSPITAL | Age: 77
End: 2022-07-20
Attending: PSYCHIATRY & NEUROLOGY
Payer: MEDICARE

## 2022-07-20 VITALS
SYSTOLIC BLOOD PRESSURE: 137 MMHG | WEIGHT: 135.13 LBS | HEART RATE: 80 BPM | BODY MASS INDEX: 23.2 KG/M2 | DIASTOLIC BLOOD PRESSURE: 79 MMHG

## 2022-07-20 DIAGNOSIS — G31.09 OTHER FRONTOTEMPORAL DEMENTIA WITH BEHAVIORAL DISTURBANCE: ICD-10-CM

## 2022-07-20 DIAGNOSIS — E72.19 OTHER DISORDERS OF SULFUR-BEARING AMINO-ACID METABOLISM: ICD-10-CM

## 2022-07-20 DIAGNOSIS — R44.3 HALLUCINATIONS: ICD-10-CM

## 2022-07-20 DIAGNOSIS — M79.7 FIBROMYALGIA: ICD-10-CM

## 2022-07-20 DIAGNOSIS — F02.80 FRONTOTEMPORAL BRAIN DISEASE: Primary | ICD-10-CM

## 2022-07-20 DIAGNOSIS — G31.09 FRONTOTEMPORAL BRAIN DISEASE: Primary | ICD-10-CM

## 2022-07-20 DIAGNOSIS — F02.818 OTHER FRONTOTEMPORAL DEMENTIA WITH BEHAVIORAL DISTURBANCE: ICD-10-CM

## 2022-07-20 DIAGNOSIS — R71.8 OTHER ABNORMALITY OF RED BLOOD CELLS: ICD-10-CM

## 2022-07-20 DIAGNOSIS — E43 UNSPECIFIED SEVERE PROTEIN-CALORIE MALNUTRITION: ICD-10-CM

## 2022-07-20 DIAGNOSIS — Z23 NEED FOR VACCINATION: Primary | ICD-10-CM

## 2022-07-20 DIAGNOSIS — R47.01 SEMANTIC APHASIA: ICD-10-CM

## 2022-07-20 LAB
ALBUMIN SERPL BCP-MCNC: 4.2 G/DL (ref 3.5–5.2)
ALP SERPL-CCNC: 94 U/L (ref 55–135)
ALT SERPL W/O P-5'-P-CCNC: 8 U/L (ref 10–44)
ANION GAP SERPL CALC-SCNC: 8 MMOL/L (ref 8–16)
AST SERPL-CCNC: 18 U/L (ref 10–40)
BASOPHILS # BLD AUTO: 0.06 K/UL (ref 0–0.2)
BASOPHILS NFR BLD: 1.2 % (ref 0–1.9)
BILIRUB SERPL-MCNC: 0.9 MG/DL (ref 0.1–1)
BUN SERPL-MCNC: 10 MG/DL (ref 8–23)
CALCIUM SERPL-MCNC: 9.9 MG/DL (ref 8.7–10.5)
CHLORIDE SERPL-SCNC: 108 MMOL/L (ref 95–110)
CO2 SERPL-SCNC: 27 MMOL/L (ref 23–29)
CREAT SERPL-MCNC: 0.8 MG/DL (ref 0.5–1.4)
CRP SERPL-MCNC: 2.5 MG/L (ref 0–8.2)
DIFFERENTIAL METHOD: ABNORMAL
EOSINOPHIL # BLD AUTO: 0.1 K/UL (ref 0–0.5)
EOSINOPHIL NFR BLD: 1 % (ref 0–8)
ERYTHROCYTE [DISTWIDTH] IN BLOOD BY AUTOMATED COUNT: 13.6 % (ref 11.5–14.5)
ERYTHROCYTE [SEDIMENTATION RATE] IN BLOOD BY PHOTOMETRIC METHOD: 11 MM/HR (ref 0–36)
EST. GFR  (AFRICAN AMERICAN): >60 ML/MIN/1.73 M^2
EST. GFR  (NON AFRICAN AMERICAN): >60 ML/MIN/1.73 M^2
FERRITIN SERPL-MCNC: 46 NG/ML (ref 20–300)
FOLATE SERPL-MCNC: 4.5 NG/ML (ref 4–24)
GLUCOSE SERPL-MCNC: 73 MG/DL (ref 70–110)
HAPTOGLOB SERPL-MCNC: 159 MG/DL (ref 30–250)
HCT VFR BLD AUTO: 38.3 % (ref 37–48.5)
HGB BLD-MCNC: 11.9 G/DL (ref 12–16)
IGG SERPL-MCNC: 1189 MG/DL (ref 650–1600)
IMM GRANULOCYTES # BLD AUTO: 0.01 K/UL (ref 0–0.04)
IMM GRANULOCYTES NFR BLD AUTO: 0.2 % (ref 0–0.5)
IRON SERPL-MCNC: 55 UG/DL (ref 30–160)
LYMPHOCYTES # BLD AUTO: 1.8 K/UL (ref 1–4.8)
LYMPHOCYTES NFR BLD: 35.7 % (ref 18–48)
MAGNESIUM SERPL-MCNC: 2.1 MG/DL (ref 1.6–2.6)
MCH RBC QN AUTO: 28.7 PG (ref 27–31)
MCHC RBC AUTO-ENTMCNC: 31.1 G/DL (ref 32–36)
MCV RBC AUTO: 92 FL (ref 82–98)
MONOCYTES # BLD AUTO: 0.3 K/UL (ref 0.3–1)
MONOCYTES NFR BLD: 5.5 % (ref 4–15)
NEUTROPHILS # BLD AUTO: 2.9 K/UL (ref 1.8–7.7)
NEUTROPHILS NFR BLD: 56.4 % (ref 38–73)
NRBC BLD-RTO: 0 /100 WBC
PLATELET # BLD AUTO: 285 K/UL (ref 150–450)
PMV BLD AUTO: 10.6 FL (ref 9.2–12.9)
POTASSIUM SERPL-SCNC: 3.2 MMOL/L (ref 3.5–5.1)
PROT SERPL-MCNC: 7.6 G/DL (ref 6–8.4)
RBC # BLD AUTO: 4.15 M/UL (ref 4–5.4)
SATURATED IRON: 14 % (ref 20–50)
SODIUM SERPL-SCNC: 143 MMOL/L (ref 136–145)
T4 SERPL-MCNC: 7.6 UG/DL (ref 4.5–11.5)
TOTAL IRON BINDING CAPACITY: 404 UG/DL (ref 250–450)
TRANSFERRIN SERPL-MCNC: 273 MG/DL (ref 200–375)
TSH SERPL DL<=0.005 MIU/L-ACNC: 1.95 UIU/ML (ref 0.4–4)
WBC # BLD AUTO: 5.1 K/UL (ref 3.9–12.7)

## 2022-07-20 PROCEDURE — 99999 PR PBB SHADOW E&M-EST. PATIENT-LVL IV: ICD-10-PCS | Mod: PBBFAC,,, | Performed by: PSYCHIATRY & NEUROLOGY

## 2022-07-20 PROCEDURE — 86038 ANTINUCLEAR ANTIBODIES: CPT | Performed by: PSYCHIATRY & NEUROLOGY

## 2022-07-20 PROCEDURE — 85025 COMPLETE CBC W/AUTO DIFF WBC: CPT | Performed by: PSYCHIATRY & NEUROLOGY

## 2022-07-20 PROCEDURE — 87389 HIV-1 AG W/HIV-1&-2 AB AG IA: CPT | Performed by: PSYCHIATRY & NEUROLOGY

## 2022-07-20 PROCEDURE — 84436 ASSAY OF TOTAL THYROXINE: CPT | Performed by: PSYCHIATRY & NEUROLOGY

## 2022-07-20 PROCEDURE — 96116 PR NEUROBEHAVIORAL STATUS EXAM BY PSYCH/PHYS: ICD-10-PCS | Mod: S$PBB,59,, | Performed by: PSYCHIATRY & NEUROLOGY

## 2022-07-20 PROCEDURE — 96132 NRPSYC TST EVAL PHYS/QHP 1ST: CPT | Mod: 59,,, | Performed by: PSYCHIATRY & NEUROLOGY

## 2022-07-20 PROCEDURE — 96132 PR NEUROPSYCHOLOGIC TEST EVAL SVCS, 1ST HR: ICD-10-PCS | Mod: 59,,, | Performed by: PSYCHIATRY & NEUROLOGY

## 2022-07-20 PROCEDURE — 80053 COMPREHEN METABOLIC PANEL: CPT | Performed by: PSYCHIATRY & NEUROLOGY

## 2022-07-20 PROCEDURE — 82784 ASSAY IGA/IGD/IGG/IGM EACH: CPT | Performed by: PSYCHIATRY & NEUROLOGY

## 2022-07-20 PROCEDURE — 84443 ASSAY THYROID STIM HORMONE: CPT | Performed by: PSYCHIATRY & NEUROLOGY

## 2022-07-20 PROCEDURE — 85652 RBC SED RATE AUTOMATED: CPT | Performed by: PSYCHIATRY & NEUROLOGY

## 2022-07-20 PROCEDURE — 30000890 MAYO MISCELLANEOUS TEST (REFLEX): Performed by: PSYCHIATRY & NEUROLOGY

## 2022-07-20 PROCEDURE — 99417 PROLNG OP E/M EACH 15 MIN: CPT | Mod: S$PBB,,, | Performed by: PSYCHIATRY & NEUROLOGY

## 2022-07-20 PROCEDURE — 83520 IMMUNOASSAY QUANT NOS NONAB: CPT | Performed by: PSYCHIATRY & NEUROLOGY

## 2022-07-20 PROCEDURE — 86341 ISLET CELL ANTIBODY: CPT

## 2022-07-20 PROCEDURE — 99214 OFFICE O/P EST MOD 30 MIN: CPT | Mod: PBBFAC,25 | Performed by: PSYCHIATRY & NEUROLOGY

## 2022-07-20 PROCEDURE — 83010 ASSAY OF HAPTOGLOBIN QUANT: CPT | Performed by: PSYCHIATRY & NEUROLOGY

## 2022-07-20 PROCEDURE — 86148 ANTI-PHOSPHOLIPID ANTIBODY: CPT | Mod: 91 | Performed by: PSYCHIATRY & NEUROLOGY

## 2022-07-20 PROCEDURE — 84446 ASSAY OF VITAMIN E: CPT | Performed by: PSYCHIATRY & NEUROLOGY

## 2022-07-20 PROCEDURE — 82728 ASSAY OF FERRITIN: CPT | Performed by: PSYCHIATRY & NEUROLOGY

## 2022-07-20 PROCEDURE — 86255 FLUORESCENT ANTIBODY SCREEN: CPT | Mod: 59 | Performed by: PSYCHIATRY & NEUROLOGY

## 2022-07-20 PROCEDURE — 83921 ORGANIC ACID SINGLE QUANT: CPT | Mod: 91 | Performed by: PSYCHIATRY & NEUROLOGY

## 2022-07-20 PROCEDURE — 30000890 HC MISC. SEND OUT TEST

## 2022-07-20 PROCEDURE — 83735 ASSAY OF MAGNESIUM: CPT | Performed by: PSYCHIATRY & NEUROLOGY

## 2022-07-20 PROCEDURE — 99417 PR PROLONGED SVC, OUTPT, W/WO DIRECT PT CONTACT,  EA ADDTL 15 MIN: ICD-10-PCS | Mod: S$PBB,,, | Performed by: PSYCHIATRY & NEUROLOGY

## 2022-07-20 PROCEDURE — 91305 COVID-19, MRNA, LNP-S, PF, 30 MCG/0.3 ML DOSE VACCINE (PFIZER): CPT | Mod: PBBFAC

## 2022-07-20 PROCEDURE — 86140 C-REACTIVE PROTEIN: CPT | Performed by: PSYCHIATRY & NEUROLOGY

## 2022-07-20 PROCEDURE — 84466 ASSAY OF TRANSFERRIN: CPT | Performed by: PSYCHIATRY & NEUROLOGY

## 2022-07-20 PROCEDURE — 82746 ASSAY OF FOLIC ACID SERUM: CPT | Performed by: PSYCHIATRY & NEUROLOGY

## 2022-07-20 PROCEDURE — 99215 OFFICE O/P EST HI 40 MIN: CPT | Mod: 25,S$PBB,, | Performed by: PSYCHIATRY & NEUROLOGY

## 2022-07-20 PROCEDURE — 96116 NUBHVL XM PHYS/QHP 1ST HR: CPT | Mod: S$PBB,59,, | Performed by: PSYCHIATRY & NEUROLOGY

## 2022-07-20 PROCEDURE — 99215 PR OFFICE/OUTPT VISIT, EST, LEVL V, 40-54 MIN: ICD-10-PCS | Mod: 25,S$PBB,, | Performed by: PSYCHIATRY & NEUROLOGY

## 2022-07-20 PROCEDURE — 99999 PR PBB SHADOW E&M-EST. PATIENT-LVL IV: CPT | Mod: PBBFAC,,, | Performed by: PSYCHIATRY & NEUROLOGY

## 2022-07-20 PROCEDURE — 96116 NUBHVL XM PHYS/QHP 1ST HR: CPT | Mod: PBBFAC,59 | Performed by: PSYCHIATRY & NEUROLOGY

## 2022-07-20 NOTE — PROGRESS NOTES
"[facility]     PATIENT: Mamta Bates  VISIT DATE: 2022  MRN: [id number]  PRIMARY PROVIDER: Simran Carter MD  : 1945       Chief complaint: Progressive Cognitive Impairment     History of present illness:      Ms. Bates is a 77-year-old right-handed female who presents today to the [facility] due to concerns related to progressive neurocognitive impairment.    Ms. Bates is accompanied by the  who participates in providing history.   Additional information is obtained by reviewing available medical records.     Relevant Background/Context   Known Relevant Genetics:  o There is no known relevant genetic testing available.   Known Relevant Family history:  o Mother  of 74 y/o CAD  o Father  in NH old age (mid-75) unknown issues, possible NCD  o Sister - DM2  o Sister in wheelchair  60s 'fell off'  o The family denies a history of early/late onset cognitive impairment.  o The family denies a history of movement disorder (PD, PDD, tremor, etc).  o The family denies a history of motor neuron disease (ALS).  o The family denies a history of developmental learning disorder (Dyslexia, ADHD, ASD, etc.).  o Sister - MDD related to LE dysfunction   Developmental/Milestones:  o The patient/family report no known birth complications or early life problems. The patient met all developmental milestones.   Learning Disorders:  o The patient/family report no signs or symptoms suggestive of developmental learning disorder.   Education:  o 14 years of formal education.  o HS.  o Associated degree in business - patient is unable to describe in meaningful detail due to aphasia   Social History:  o Patient lives with her  who is the primary caregiver. She is limited additional social support. She has no living relatives is the "last overlying ". And has no children. since USP for the age " 65 patient has not been engaged any significant meaningful social or community activities. Family reports that following FDC she was actively traveling with her  for. However this stopped early. She was diagnosed with fibromyalgia sometime within 5 years FDC and has reported minimal social or cognitive stimulation in the form of social with her cognitive exercises subsequently thereafter.  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 She reports being the  of [employer] system for extended period of time and frequently references politicians and advocacy groups that she has been involved with. Patient retired at the age [age] for unclear reasons.   Relevant Medical History:  o HTN  o Fibromyalgia   Relevant Exposure/Trauma to CNS:  o No History of Traumatic Brain Injury or Concussions  o No History of Chronic Mood Disorder  o No History of Chronic Stress  o No History of Chronic Substance Abuse  o No History of Malnutrition  o No History of Toxic Exposure     Neurocognitive Disorder Features   Onset/Duration:  o Jul 2020 (~2-year)   First Symptom:  o Memory impairment   Progression:  o Gradually Progressive   Clinical Course:  o Hospitalization (05/04/2022)  - Type: Chart Review. The patient is a 77-year-old female past medical history fibromyalgia, hypertension, hysterectomy who presents with her  for concern for altered mental status including slurred speech, pain to lower extremities, seeing lights, and not making sense. Patient's her  reports that he believes that she has had some early signs of dementia at getting worse over the past few months however the patient does not agree to seek any medical care until today. He says it has been hard to convince her to seek any treatment. He states that sometimes she is hallucinating and sometimes she has garbled speech. He states today he left the house this morning at 9:00 a. M. To run  "errands. At that time she was not "normal" but that she was at her baseline. When he returned home about 2-1/2 hours ago he noted that she seemed to have slurred speech, reporting seeing lights, complaining severe pain to her legs.  o Electronic Medical Record (05/06/2022)  - Type: Chart Review. Pt recently D/C'd from hospital for HTN encephalopathy. Concern from pt's her  for ongoing cognitive decline past couple of years but did not want to see a neurologist. Started on seroquel 12. 5mg QHS at D/C. [facility] on file with mild volume loss per report.  o Primary Care Provider (05/11/2022)  - Type: Chart Review. Ms. Bates is a 77 year-old female with fibromyalgia and HTN who is presenting to clinic as a hospital follow-up appointment. She was recently hospitalized with encephalopathy secondary to a hypertensive urgency that resolved with re-initiation of her anti-hypertensives. She was discharged home on nifedipine and seroquel was started due to concerns of chronic changes in mentation. Since discharged home she has been doing well. Her  has been monitoring her blood pressure and has had to hold the nifedipine several times due to systolic blood pressure readings of 100. Per her , she has been having intermittent changes in mental status for the past months- he relayed this concern to the primary team while she was hospitalized and an appointment has been scheduled with neurology for further workup.     Current Presentation   Recent/Interim History:  o The patient presents with her , who is the primary historian. Additional history is gathered from a review of previous medical records. 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 The circumstances surrounding her early longterm are unclear. The patient professes significant pride in her work and has not been very active since her longterm. The family reported within a few years of her " USP, the patient was diagnosed with fibromyalgia in the setting of chronic pain and cramps. The earliest reference to this diagnosis in the medical chart was in 2015. It is unclear how this diagnosis was made, her specific symptoms, and whether there was an actual association in time with the patient's cognitive impairment. The patient denies any personal or her family history of autoimmune disorders. The onset of the patient's symptoms is unclear; however, her family thinks it happened during early COVID. The patient denies yandy COVID before or during the beginning of her symptoms. Her family denies any other overt changes in their daily behavior or medications. Over the last two years, her family reported in no specific order progressive incomprehensive speech, garbled talk with agitation, irritability, and apathy. The patient has always been active, social, and engaged; however, she has become more withdrawn and apathetic watching TV. She has become mildly paranoid and disinterested. The family reported an overall change in her personality; however, more so a magnification of baseline tendencies rather than a fundamental shift in temperament. Her family says she has been having increasing episodes of confusion and miscommunication between the patient and her family resulting in growing irritability and frustration, which compounds deficits and has resulted in agitation. During periods of frustration with agitation patient's cognitive deficits, including memory deficits, appear to worsen the patient's behavior programs more difficult to manage. Between 2020 and 2021, deficits gradually increased, becoming more possessive without over compulsions worsening memory and irritability. During this time frame, her family denies any overt signs of disinhibition, lack of sympathy or empathy, change in eating behavior, restlessness, movement disorders, or motor changes. From [id number] her family has requested  a medical professional see her; however, she has refused this at every opportunity. The patient does have significant semantic aphasia deficits. She can describe in unclear words that she does not feel intervention would change anything and that her deficits are due to age. In 2022, the patient's cognitive deficits cristina to medication noncompliance and interference with instrumental activities of daily living. The patient presented to the hospital in early 2022 with hypertensive encephalopathy, likely due to medication noncompliance. Following this hospitalization, staff recognized the patient's neurocognitive deficits, and she was referred to our neurocognitive Clinic for further evaluation. On presentation, the patient has diminished interpersonal responsiveness to social cues and is loud, and fast with somewhat incomprehensible speech, semantic aphasia, limited insight, and mild disinhibition.   Unresolved Concern(s) reported by patient/family:  o Semantic aphasia  o Opposition to care        Review of cognitive, visuospatial, motor, sensory, and behavioral systems:     Memory:    Ms. Herring memory has worsened in the past few years.   She does repeat statements or asks the same question repeatedly.   She does have difficulty remembering recent important conversations.   She does not have difficulty remembering recent events.   She does forget information within minutes.   Her remote memory is intact.   Her recent retrograde memory is impaired.  Attention:    Her attention and concentration are impaired.   She does not have attentional fluctuations.   She does have difficulty with selective attention.   She does become easily distracted.   She does have difficulty with divided attention.  Executive:    Ms. Hoffmans cognitive processing speed is slower.   She does have difficulty with working memory.   She does misplace personal items (e.g., keys, cell phone, wallet) more frequently.   She does have  difficulty keeping track of her medications.   She does have difficulty with planning/organizing/completing multistep tasks.   She does have difficulty with executive attention.   She does not have difficulty with flexible thinking.   She does not difficulty with self control.   She is exhibiting new symptoms that suggest they have become more impulsive, rash and/or careless.   Her judgment is impaired.  Language:    Her speech output is affected.   She does forget people's names more frequently.   She does have word-finding difficulties.   Ms. Hoffmans speech is fluent and non-effortful.   Ms. Hoffmans speech is not grammatically intact.   She does not make word substitutions.   She does not have difficulty reading.   She appears to have impaired comprehension.  Visuospatial:    She has new visuospatial problems.   She has become confused or disoriented in *new*, unfamiliar places.   She does not have trouble with navigation.   She does not get lost in familiar places.   Ms. Bates does not have visuospatial disorientation.   She does not have difficulty recognizing objects or faces.  Motor/Coordination:    Ms. Bates does have difficulty with walking.   She does feel imbalanced.   She denies having fallen.   She does not appear to have new muscle weakness.   She does have difficulty buttoning shirts, operating zippers, or manipulating tools/utensils.   Her handwriting has not become micrographic.   She does have a resting tremor.   She denies having any new involuntary movements and/or muscle jerking.   She does not have swallowing difficulty.   She denies new muscle cramps and twitching.  Sensory:    Ms. Bates denies new numbness, tingling, paresthesias, or pain.   Ms. Bates denies a loss of vision, blurry vision, or double vision.   Ms. Bates denies new loss of hearing or worsening tinnitus.   Ms. Bates denies anosmia.  Sleep:    Ms. Bates reports difficulty sleeping.   Ms. Bates does not  snore or have witnessed apneas while sleeping.   When she wakes up in the morning, she does feel well-rested.   She denies dream-enactment behavior.   She denies symptoms suggestive of restless leg syndrome.  Behavior:    Ms. Hoffmans personality has changed. Comment: exaggeration of baseline   She does have symptoms of disinhibition and social inappropriateness. Comment: exaggeration of baseline   She is exhibiting symptoms to suggest a loss of manners and/or decorum. Comment: exaggeration of baseline   She does not appear apathetic or has decreased motivation.   She does not appear to have a change in inertia.   Ms. Hoffmans emotional expression has changed. Comment: exaggeration of baseline   She does have emotional blunting or lability. Comment: exaggeration of baseline   She does have symptoms of irritability and mood lability.   She does not have symptoms of agitation, aggression, or violent outbursts.   Her insight into his disease and situation is impaired.   Her personal hygiene is intact.   She is not exhibiting a diminished response to other people's needs and feelings.   She is exhibiting diminished social interest, interrelatedness, and/or personal warmth.   She does appear restless.   She denies new and/or worsening simple repetitive behaviors.   Her speech has become simplified and/or repetitive/stereotyped.   She reports symptoms that are suggestive of new/worsening complex repetitive/ritualistic compulsions and behaviors.   She does not have symptoms of hyper-religiosity or dogmatism.   Her interests/pleasures have not become restrictive, simplified, interrupting, or repetitive.   She denies a change of self-stimulating behavior.   She denies any changes in eating behavior.   She denies increased consumption of food or substances.   She denies oral exploration or consumption of inedible objects.  Psychiatric:    She does not feel depressed.   She is exhibiting symptoms of  social withdrawal/indifference.   She denies anxiety.   She does not exhibit cycling behavior.   She does exhibit hyperactive behavior.   She is exhibited symptoms of paranoia.   She does not have delusions.   She does not have hallucinations. Comment: possible? Unclear if language impairment and not able to vocalize her thoughts accurately or actual hallucination   She does not have a history of sensitivity to neuroleptic/psychotropic medications.  Medical Review of Systems:    Ms. Bates does not have constipation.   Ms. Bates does not have urinary incontinence.   Ms. Bates denies orthostatic lightheadedness.   Ms. Bates's weight is stable.  Functional status:   Difficulty performing the following Instrumental ADLs:  o Household chores: Yes  o Food Preparation: No  o Shopping: Yes  o Ability to Handle Finances: Yes  o Transportation/Driving: Yes  o Household Appliances/Stove: Yes  o Laundry: Yes   Difficulty performing the following Basic ADLs:  o Dressing: No  o Bathing: No  o Toileting: No  o Personal hygiene and grooming: No  o Feeding: No  Care Management:   Patient/Family Safety Concerns:  o Medication Adherence: No  o Home Safety: No  o Wandered: No  o Firearms: No  o Fall Risk: No  o Home Alone: No       Past Medical History:   Diagnosis Date    Fibromyalgia     Hypertension        Past Surgical History:   Procedure Laterality Date    HYSTERECTOMY         Family History   Problem Relation Age of Onset    Heart disease Mother        Social History     Socioeconomic History    Marital status:    Tobacco Use    Smoking status: Never Smoker    Smokeless tobacco: Never Used   Substance and Sexual Activity    Alcohol use: No    Drug use: No    Sexual activity: Yes     Partners: Male       Medication:     Current Outpatient Medications on File Prior to Visit   Medication Sig Dispense Refill    aspirin/salicylamide/caffeine (BC HEADACHE POWDER ORAL) Take 1 Package by mouth 2 (two) times a  day.      guaiFENesin 100 mg/5 ml (ROBITUSSIN) 100 mg/5 mL syrup Take 200 mg by mouth 3 (three) times daily as needed for Cough.      magnesium oxide (MAG-OX) 400 mg (241.3 mg magnesium) tablet Take 400 mg by mouth once daily.      NIFEdipine (PROCARDIA-XL) 60 MG (OSM) 24 hr tablet Take 1 tablet (60 mg total) by mouth once daily. 90 tablet 3    QUEtiapine (SEROQUEL) 25 MG Tab Take 1/2 tablet by mouth every evening 90 tablet 1    tramadol (ULTRAM) 50 mg tablet Take 50 mg by mouth every 6 (six) hours as needed for Pain.       No current facility-administered medications on file prior to visit.        Review of patient's allergies indicates:  No Known Allergies    Medications Reconciliation:   I have reconciled the patient's home medications and discharge medications with the patient/family. I have updated all changes.  Refer to After-Visit Medication List.    Objective:  Vital Signs:  Vitals:    07/20/22 1012   BP: 137/79   Pulse: 80     Wt Readings from Last 3 Encounters:   07/20/22 1012 61.3 kg (135 lb 2.3 oz)   05/05/22 0822 62.6 kg (138 lb)   05/04/22 1640 62.6 kg (138 lb)   08/17/17 1326 68 kg (150 lb)     Body mass index is 23.2 kg/m².     Neurological examination:  Mental Status:    Her appearance was abnormal.   Throughout the interview, she behaved abnormally and was not cooperative.   Ms. Bates's energy level is abnormal.   Ms. Hoffmans energy level is elevated/higher than normal/hyperactive.   Her orientation is not entirely accurate.   Her attention/concentration is impaired.   She can complete three-step commands.   Her thought process is not logical or goal-oriented.   She demonstrated impaired insight based on actions, awareness of her illness, plans for the future.   She demonstrated impaired judgment based on actions and plans for the future.   She has no evidence of hallucinations (auditory, visual, olfactory).   She has no evidence of delusions (paranoid, grandiose, bizarre).  Cranial  "Nerves:    Her pupils were normal.   Her visual fields were full to confrontation in all quadrants.   Her ocular pursuit in the horizontal and vertical plane was complete.   Her saccadic initiation, velocity, and amplitude are normal.   Her facial strength was normal.   Her facial expression was symmetric and appropriate to the context.   Her facial sensation was intact to light touch bilaterally.   Her hearing was normal bilaterally.   Her tongue showed no evidence of scalloping.   She can protrude their tongue beyond Her lips for >10 sec.  Speech/Language:    Ms. Hoffmans speech was not completely fluent and non-effortful.   Her speech timbre is abnormal.    Comment: loud   Her speech rate is abnormal.    Comment: fast   She has no articulation (segmental features) errors.   She has no speech dysdiadochokinesia with repetition of syllables such as "/PA/, /TA/, /KA/, /OM/".   She made no errors during the repetition of rapid syllables and or words such as "caterpillar" "", and "huckleberry"   She has no repetition errors of rapid sequences of consonants, such as in "Restorationism Sabianism" or "[place] [employer]".   She has no prosody (suprasegmental features) errors.   Her stress assessment showed no repetition errors in linguistically complex words, including multisyllabic words ("planetarium," "questionable," "accomplishment," "phonetic.   Ms. Hoffmans speech is not dysarthric.   She does not have hyperkinetic dysarthria.   Ms. Bates showed evidence of anomia.   She showed evidence of anomia during spontaneous speech.   She showed evidence of anomia during confrontational naming.   She makes no phonological loop errors.   She makes no errors during the repetition of gibberish words (e.g., "Supercalifragilisticexpialidocious," "Pigglywiggly," "Woospiedoo," "Zowzy," "Bazinga").   She makes no errors during the repetition of complex meaningless phrases (e.g., "The horse raced past " "the barn fell.", "The complex houses  and single soldiers and their families," "Wishes are hopping, and trees are west," and "Brushing liked to karen madison's direction").   She can comprehend commands that cross the midline (e.g., with your left thumb, touch your right ear).   She has difficulty comprehending commands that depend on syntax.   Her speech is not grammatically intact.   She makes superordinate errors when asked to draw an animal.  Motor:    Ms. Hoffmans bilateral upper extremity muscle bulk is appropriate.   Ms. Hoffmans upper extremity muscle tone is increased.    Comment: Unable to relax, b/l paratonia R>L cant assess for rigidity   Ms. Hoffmans bilateral upper extremity muscle tone does not suggest spasticity.   There is evidence of paratonia.    Comment: Muscle tone is increased and there is evidence of paratonia.   Assessment of motor strength was symmetric and at minimal anti-gravity.   There is no pronator or downward drift.   There is no myoclonus observed in Ms. Bates's bilateral upper and lower extremities.   There are no fasciculations observed in Ms. Bates's bilateral upper and lower extremities.  Coordination:    She has no bilateral upper extremity limb dysmetria or past pointing on finger-nose-finger bilaterally.   She has no limb dysdiadochokinesia of the upper extremity on the pronation/supination test and screwing in a light bulb or lower extremity during tapping ball of each foot bilaterally.   She has a visible tremor.   She has no kinetic tremor bilaterally.   She has a postural tremor.   She has no resting tremor bilaterally.   She has evidence of interhemispheric motor control deficits.   She demonstrates evidence of motor overflow.   She has no dyskinetic movements.   Ms. Hoffmans upper extremity fine motor coordination was abnormal.    Comment: mild R>L   Ms. Bates's bilateral upper extremity coordination with finger tapping, pronation/supination, and the " open-close fist showed slowing.   Ms. Bates's bilateral upper extremity coordination with finger tapping, pronation/supination, and the open-close fist showed hypometria.   Ms. Bates's bilateral upper extremity coordination with finger tapping, pronation/supination, and the open-close fist showed dysrhythmia.  Higher Cortical Function:    Ms. Bates showed no evidence of simultanagnosia (Navon hierarchical letters).   She demonstrates no evidence of dorsal simultanagnosia (overlapping objects).   She demonstrates no evidence of ventral simultanagnosia (complex picture synthesis).   Ms. Bates showed evidence of visuospatial constructional dysfunction.   Ms. Bates showed evidence of angular gyrus disconnection (insular-operculum).   She has evidence of dysgraphia.   Ms. Bates showed evidence of apraxia.   She showed no evidence of ideomotor apraxia performing tool-use pantomimes (e.g., use a hammer, use a screwdriver, use a comb, flip a coin, waving goodbye).   She showed no evidence of ideational apraxia (e.g., taking off and putting on shoes, folding paper into an envelope).   She showed evidence of limb-motor apraxia during mimicking complex bimanual hand shapes.   She showed no evidence of buccofacial apraxia (e.g., blow out a candle, puff out cheeks, and whistle).   She showed dysexecutive behavior.   She showed no utilization or imitation behavior.   She has evidence of perseverative or stereotyped behavior.   She demonstrated stimulus-bound behavior.  Sensory:    Her cortical sensory assessment demonstrated no neglect bilaterally.   She he had no astereognosis (paper clip, ring, dime) bilaterally in the palms.   She he had no agraphesthesia (drawing numbers) in the palms.  Reflexes:    Reflexes were symmetric and 2+ at biceps, 2+ triceps, and 2+ brachioradialis, 2+ at the knees bilaterally, there was no cross-abductor sign, 2+ in the bilateral ankles.  Gait:    She can arise from a chair and sit  back down without using their arms.   Her gait was normal.   When attempting to walk abnormally (heels, tiptoes, tandem), she makes no errors.       Neuropsychological Evaluation Summary:     Prior Neurocognitive/Neuropsychological Evaluations   Summary from EMR:    Neurocognitive Evaluation completed on 07/20/2022:  Memory   Registration-3 3/3 Within Normal Limits.   Recall-3 0/3 Impairment: Significant.   Recall-5 0/5 Impairment: Significant.   Executive   Three-step command 2/3 Impairment: Moderate.   Trials-1 0/1 Impairment: Significant.   WORLD Backward 4/5 Impairment: Mild to Normal.   Digit Span - 2 2/2 Within Normal Limits.   Serial Sevens 0/3 Impairment: Significant.   Fluency 0/1 Impairment: Significant.   Digit Span Backwards 3 Impairment: -1.6 STDs below the average score based on age and education.   Visuospatial   Intersecting Pentagons 0/1 Impairment: Significant.   3D Cube Copy 0/1 Impairment: Significant.   Clock Draw 2/3 Impairment: Moderate.   Bullock Copy 12/17 Impairment: Moderate.   Overlapping Images 5/5 Within Normal Limits.   Picture Synthesis 2/3 Impairment: Moderate.   Attention   Orientation-10 5/10 Impairment: Moderate.   Orientation-6 3/6 Impairment: Moderate.   Alternating Sequence 1/1 Within Normal Limits.   Digit Span Forwards 7 Within Normal Limits.   Language   Repetition-1 1/1 Within Normal Limits.   Naming-2 0/2 Impairment: Significant.   Following written command 1/1 Within Normal Limits.   Writing a complete sentence 1/1 Within Normal Limits.   Naming-3 0/3 Impairment: Significant.   Repetition-2 2/2 Within Normal Limits.   Abstraction 0/2 Impairment: Significant.   15-Item BNT 3/15 Impairment: -5.8 STDs below the average score based on age and education.   Repetition of Phrases 5/5 Within Normal Limits.   Verbal Agility 6/6 Within Normal Limits.   SYDBAT - Semantic Association 5/30 Impairment: Significant.   Repeat & Point - Nonfluent 10/10 Within Normal Limits.   Repeat &  Point - Semantics 1/10 Impairment: Significant.   Aggregate Scores   MMSE 17/30 MMSE Score suggestive of moderate cognitive impairment.   MOCA 10/30 MOCA Score suggestive of moderate cognitive impairment.   Neuropsychiatric/Behavioral Focused Evaluation Assessment   BEHAV5+ 4/6 See ROS section for a full description   Laboratories:     Lab Date Value [Reference]   Metabolic Screening           Ferritin 2022, May-11    37 [20.0 - 300.0 ng/mL]      Hemoglobin A1C External 05/04/2022  4.9 [4.0 - 5.6 %]      TSH 2022, May-04    2.687 [0.400 - 4.000 uIU/mL]      Glucose 2022, May-05    84 [70 - 110 mg/dL]      Iron 2022, May-11    60 [30 - 160 ug/dL]      Saturated Iron 2022, May-11    14 (L) [20 - 50 %]      TIBC 2022, May-11    432 [250 - 450 ug/dL]      Transferrin 2022, May-11    292 [200 - 375 mg/dL]      Albumin 2022, May-04    3.9 [3.5 - 5.2 g/dL]      Alkaline Phosphatase 2022, May-04    86 [55 - 135 U/L]      ALT 2022, May-04    14 [10 - 44 U/L]      AST 2022, May-04    22 [10 - 40 U/L]      BILIRUBIN TOTAL 2022, May-04    1.0 [0.1 - 1.0 mg/dL]      PROTEIN TOTAL 2022, May-04    7.1 [6.0 - 8.4 g/dL]      Cholesterol 2022, May-05    189 [120 - 199 mg/dL]      HDL 2022, May-05    52 [40 - 75 mg/dL]      Non-HDL Cholesterol 2022, May-05    137 [mg/dL]      Triglycerides 05/11/2022  75 [30 - 150 mg/dL]      Thiamine 05/11/2022  44 [38 - 122 ug/L]      Vitamin B-12 05/05/2022  494 [210 - 950 pg/mL]      Neuroendocrine/Electrolyte Screening   Magnesium 05/04/86962730, May-04    1.9 [1.6 - 2.6 mg/dL]  2.2 [1.6 - 2.6 mg/dL]      BUN 2022, May-05    9 [8 - 23 mg/dL]      Chloride 2022, May-05    113 (H) [95 - 110 mmol/L]      Creatinine 2022, May-05    0.7 [0.5 - 1.4 mg/dL]      Potassium 2022, May-05    3.5 [3.5 - 5.1 mmol/L]      Sodium 2022, May-05    140 [136 - 145 mmol/L]      Infectious Disease/Immunocompromised Screening   SARS-CoV-2 RNA, Amplification, Qual 2022, May-04    Negative      Hepatitis C Ab 2022,  May-04    Negative      Standard Hematology Screen   Hematocrit 2022, May-05    33.2 (L) [37.0 - 48.5 %]      Hemoglobin 2022, May-05    10.3 (L) [12.0 - 16.0 g/dL]      MCV 2022, May-05    92 [82 - 98 fL]      Platelets 2022, May-05    219 [150 - 450 K/uL]      Toxin/Heavy Metal Screening   Amphetamine Screen, Ur 2022, May-04    Negative      Barbiturate Screen, Ur 2022, May-04    Negative      Benzodiazepines 2022, May-04    Negative      Cocaine (Metab.) 2022, May-04    Negative      Marijuana (THC) Metabolite 2022, May-04    Negative      Methadone metabolites 2022, May-04    Negative      Opiate Scrn, Ur 2022, May-04    Negative      Phencyclidine 2022, May-04    Negative      Delirium Screening   Glucose, UA 2022, May-04    Negative      Ketones, UA 2022, May-04    Negative      Leukocytes, UA 2022, May-04    1+ (A)      NITRITE UA 2022, May-04    Negative      Protein, UA 2022, May-04    Negative      RBC, UA 2022, May-04    2 [0 - 4 /hpf]      WBC, UA 2022, May-04    1 [0 - 5 /hpf]           Neuroimaging:    MRI brain/head without contrast on 5/4/2022   Formal interpretation by Radiology:   Please note image quality is mildly degraded by patient motion artifact, most notably postcontrast images. There is no abnormal restricted diffusion to suggest acute infarction. There is generalized cerebral volume loss with compensatory prominence of the extra-axial spaces, cerebral sulci and ventricular system. The ventricles demonstrate no evidence of hydrocephalus or midline shift. Minimal periventricular T2/FLAIR hyperintensity which is nonspecific but likely reflect sequela of chronic microvascular ischemic change. There are no abnormal areas of gradient susceptibility to suggest parenchymal hemorrhage. No extra-axial hemorrhage or fluid collections. Following the administration of IV contrast, allowing for motion artifact, there are no definite pathologic foci of enhancement. The craniocervical junction and  sellar regions are within normal limits.   Independently reviewed radiological imaging by Jaison Jackson MD. MPH. Behavioral Neurologist   T1: Mild-to-moderate frontal temporal lobar degeneration with regional atrophy most well appreciated in the anterior cortices dorsal greater than ventral with thinning of the anterior cingulate sulci, atrophy of the medial prefrontal dorsal lateral prefrontal relatively mild atrophy of the orbital frontal cortex. Most atrophy appears to be the dorsal midline greater than lateral prefrontal cortex. In addition there is right greater than left anterior temporal atrophy with relative sparing of hippocampi however still mild bilateral hippocampal it atrophy. No significant posterior cortical atrophy. Generalized thinning of the anterior cingulate with sparing of the mid brain and pontine region.   T2/FLAIR: No Significant hyperintensities appreciated on MRI T2/FLAIR   DWI/ADC: No Significant DWI hyperintensities/hypointensities. No ADC correlation.   SWI/GRE: No Significant hypointensities to suggest cortical/subcortical hemosiderin deposition.   Impression: : Bilateral prefrontal cortical atrophy and right greater than left anterior temporal atrophy consistent with frontal temporal lobe degeneration. Sparing of the brainstem. No significant subcortical white matter disease. No significant posterior cortical atrophy.     Procedures:    Electrocardiogram on 5/4/2022   Formal interpretation:   Vent. Rate : 071 BPM     Atrial Rate : 071 BPM    P-R Int : 166 ms          QRS Dur : 068 ms     QT Int : 384 ms       P-R-T Axes : 068 011 045 degrees    QTc Int : 417 ms Normal sinus rhythm Low voltage QRS Abnormal ECG   Independently reviewed Electrocardiogram by Jaison Jackson MD. MPH. Behavioral Neurologist   Impression: : Received ECG has no evidence of sinus node disease. HR (>=50-60). Prolonged MD interval (>0.22 s). Broad QRS complex (> 0.12 s).     Clinical Summary:      Ms. Bates is a 77-year-old right-handed female with a relevant past medical history of HTN with encephalopathy, fibromyalgia, who presents reporting a 2-year history of gradually progressive neurocognitive impairment.       The clinical history is suggestive of:   Memory Impairment: STM encoding impairment, LTM encoding-retrieval impairment, Amnesia of fixation   Attention Impairment: Attention, Selective attention, Sustained attention, Shifting attention   Executive Impairment: Energization, Working Memory, Set-Shifting, Response Inhibition   Language Impairment: Language Dysfunction, Grammar Dysfunction, Receptive Dysfunction   Visuospatial Impairment: Allocentric Spatial Processing   Motor/Coordination Impairment: Sensory motor integration, Central pattern generators dysfunction   Behavior Impairment: Emotional Regulation, Self-Preservation Dysregulation, Social Coherence, Sensorimotor Dysregulation   Psychiatric Impairment: Stimulation Dysregulation, Paranoia   iADL Impairment: Scott Instrumental Activities of Daily Living Scale  The neurological examination is significant for:   Cortical Frontal Dysfunction: non-fluent aphasia (fluency), agrammatic aphasia (syntax comprehension, substitutions)   Cortical Frontal-Parietal Disconnection: apraxia (limb-motor)   Cortical Temporal Dysfunction: anomic aphasia (spontaneous, confrontational), semantic aphasia (superordinate errors)   Cortical Temporal-Parietal Dysfunction: dysgraphia   Cortical Transcallosal Disconnection: interhemispheric motor control (interhemispheric motor control ), motor efference (motor overflow)   Executive Impairment: thought disorder, judgment, dysexecutive behavior (perseverative/stereotyped, stimulus-bound)   Movement Disorder (Hyperkinetic): tremor (postural)   Movement Disorder (Hypokinetic): paratonia (tone), parkinsonism (bradykinesia)   Movement Disorder (Speech): abnormal vocal features (volume, rate)  Informal  neuropsychology battery is positive (based on age and education) for:   Behavior/Language Predominant multidomain major cognitive impairment   Severe Memory Impairment: recall.   Severe Language Impairment: She scored >3 standard deviations below the norm on at least one measure. She had difficulty with naming, abstract, semantic association, Semantics.   Moderate Visuospatial Impairment: visuospatial construction, simultanagnosia.   Moderate Executive Impairment: She scored >1.5 standard deviations below the norm on at least one measure. She had difficulty with working memory, lexical fluency.   Moderate Attention Impairment: orientation.   MMSE 17/30: MMSE Score suggestive of moderate cognitive impairment.   MOCA 10/30: MOCA Score suggestive of moderate cognitive impairment.   BEHAV5+ 4/6: See ROS section for a full description  Neurological imaging   MRI brain/head without contrast (5/4/2022): Bilateral prefrontal cortical atrophy and right greater than left anterior temporal atrophy consistent with frontal temporal lobe degeneration. Sparing of the brainstem. No significant subcortical white matter disease. No significant posterior cortical atrophy.        Assessment:        Ms. 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    Ms. Hoffmans clinical presentation meets the criteria for Dementia (McDarrylann, et al. 2011 Alzheimer's & Dementia).      Concern regarding an intraindividual change in cognition diagnosed through a combination of history and objective cognitive assessment   Impairment in two or more cognitive domains   Interference with independence in functional abilities.   Represents a decline from previous levels of functioning.   Not explained by delirium or major psychiatric disorder      Ms. 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 Neurology. 2011)      Early apathy or inertia.   Early perseverative, stereotyped, or compulsive/ritualistic behavior: simple repetitive movements, complex, compulsive or ritualistic behaviors, stereotypy of speech.   Frontal and/or anterior temporal atrophy on MRI or CT   Impaired confrontation naming.   Impaired single-word comprehension.   Impaired object knowledge, particularly for low-frequency or low-familiarity items.   Surface dyslexia or dysgraphia.   Spared repetition.   Spared speech production (grammar and motor speech).   Predominant anterior temporal lobe atrophy.     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 There is however reported symptoms of possible hallucinations. Patients  has never seen her respond to sounds/sights that were no there as such it is unclear whether 'hallucinations' are actually an inability to articulate herself or actual sensory hallucinations.     The pathology underlying Ms. 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       The observations made above, were discussed with the patient and her family. The family reports that minimum a 2 year history progressive multi domain cognitive impairment sufficient interfere with instrumental activities of daily living. Neurocognitive assessment shows language predominant multi major cognitive impairment. Neurological imaging is consistent with frontal temporal lobar degeneration. 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 Is unclear whether the patient is actively having hallucinations or whether not this is due to semantic aphasia. Recommend monitoring. Given patient's age likely mixed pathology. Recommend a trial of donepezil with follow-up in 1 month ongoing screening labs for reversible cause of cognitive impairment. Following this may consider increasing Seroquel for behavioral disinhibition and referral to speech therapy for additional care support.        Care Management Plan:     #Diagnostic Workup:    Laboratories: Hcy, Free T4, TSH, B1, B9, B12, MMA, HIV Ab/Ag, RPR, D, NFL  #Neurocognitive Disorder Treatment:   Start trial of donepezil 5mg and titrate to 10mg   We have discussed opportunities for further testing with CSF biomarkers or Amyloid-PET   Next appointment will consider addition of speech therapy  #Behavioral Disorder Treatment:   No indication for memantine at this time  #Behavioral/Environmental Treatment   We recommend engaging in activities that stimulate cognitively and socially while avoiding excessive stimulation and fatigue in overwhelmingly complex situations.   We recommend integrating routine and schedule into your daily life. https://www.alzheimersproject.org/news/the-importance-of-routine-and-familiarity-to-persons-with-dementia/  #Health Maintenance/Lifestyle Advice   We have discussed the value in aggressively controlling vascular risk factors like hypertension, hyperlipidemia, and Diabetes SBP<130, LDL<100, A1C<7.0.   We discussed the need to optimize lifestyle choices including a heart-healthy diet (e.g., Mediterranean or DASH), increased cardiovascular exercise (goal 150 minutes of moderate-intensity per week), and stay cognitively and socially active.  #Support   We all need support sometimes. Get easy access to local resources, community programs, and services. https://www.communityresourcefinder.org/   Learn more about Cognitive Impairment in [place]:  "https://www.alz.org/professionals/public-health/state-overview/[place]  #Safety   [place] has no laws against driving with dementia specifically but obviously has laws about medical conditions which impact a person's ability to drive safely. If you believe your loved one's driving capacity has diminished, please reach out to either your primary care physician or our office to discuss driving restrictions or revocation of their license. To learn more: https://www.dementiacarecentral.com/caregiverinfo/driving-problems/   The Alzheimer's Association administers the nationwide "Safe Return" program with identification bracelets, necklaces, or clothing tags and 24-hour assistance. More information is available online at https://www.alz.org/help-support/caregiving/safety/medicalert-with-24-7-wandering-support  #Follow up:   Follow-up in 4 weeks (Aug 2022).    Thank you for allowing us to participate in the care of your patient. Please do not hesitate to contact us with any questions or concerns.     It was a pleasure seeing Ms. Bates and we look forward to seeing them at their follow-up visit.     This note is dictated on M*Modal Fluency Direct word recognition program. There are word recognition mistakes that are occasionally missed on review.      Scheduled Follow-up :  Future Appointments   Date Time Provider Department Center   8/22/2022  9:00 AM Elisabeth Foy MD [facility]       After Visit Medication List :     Medication List          Accurate as of July 20, 2022 12:58 PM. If you have any questions, ask your nurse or doctor.            CONTINUE taking these medications    BC HEADACHE POWDER ORAL     guaiFENesin 100 mg/5 ml 100 mg/5 mL syrup  Commonly known as: ROBITUSSIN     magnesium oxide 400 mg (241.3 mg magnesium) tablet  Commonly known as: MAG-OX     NIFEdipine 60 MG (OSM) 24 hr tablet  Commonly known as: PROCARDIA-XL  Take 1 tablet (60 mg total) by mouth once daily.     QUEtiapine 25 MG " Tab  Commonly known as: SEROQUEL  Take 1/2 tablet by mouth every evening     traMADoL 50 mg tablet  Commonly known as: ULTRAM            Signing Physician:  Jaison Javier MD    Billing:    -----------------------------------------------------------------------------    I spent a total of 105 minutes (time-in: 10:30 AM; time-out: 12:15 PM) on 07/20/2022, in-person face-to-face with the patient and caregiver(s), >50% of that time was spent counseling regarding the symptoms, treatment plan, risks, therapeutic options, lifestyle modifications, and/or safety issues for the diagnoses above.    10/14 Review of Systems completed and is negative except as stated above in HPI (Systems reviewed: Const, Eyes, ENT, Resp, CV, GI, , MSK, Skin, Neuro)    I reviewed previous labs for a total of 5 minutes on 07/20/2022. This is directly related to the face-to-face encounter. Review of previous labs was performed all negative except as stated above in HPI    I reviewed previous diagnostic testing for a total of 5 minutes on 07/20/2022. This is directly related to the face-to-face encounter. A review of previous diagnostic testing was performed was noted to be within normal limits except as is stated above in HPI    I performed a neurobehavioral status examination that included a clinical assessment of thinking, reasoning, and judgment. Please see above HPI and ROS for full details. This exam was performed on 07/20/2022 and included 15 minutes spent on direct face-to-face clinical observation and interview with the patient and 18 minutes spent interpreting test results and preparing the report. The total time of 33 minutes spent on the neurobehavioral status examination is not included in the time spent on evaluation and management coding.    I performed a neuropsychological evaluation that included the application of a series of standardized neurocognitive tests. Please see the informal neuropsychological assessment above for full  details. This evaluation was performed on 07/20/2022 and included 12 minutes spent on direct face-to-face clinical standardized test administration with the patient and 19 minutes spent on interpreting standardized test results, integrating patient data into a treatment plan, and providing feedback to the patient and caregiver. The total time of 31 minutes spent on the neuropsychological evaluation is not included in the time spent on evaluation and management coding.    Total Billing time spent on encounter/documentation for this patient's evaluation and management, not including the neurobehavioral status examination and neuropsychological evaluation: 90 minutes.

## 2022-07-21 ENCOUNTER — PATIENT MESSAGE (OUTPATIENT)
Dept: NEUROLOGY | Facility: CLINIC | Age: 77
End: 2022-07-21
Payer: MEDICARE

## 2022-07-21 LAB
ANA SER-ACNC: NORMAL
HIV 1+2 AB+HIV1 P24 AG SERPL QL IA: NEGATIVE
VIT B12 SERPL-MCNC: 307 NG/L (ref 180–914)

## 2022-07-22 LAB — TREPONEMA PALLIDUM IGG+IGM AB [PRESENCE] IN SERUM OR PLASMA BY IMMUNOASSAY: NONREACTIVE

## 2022-07-24 RX ORDER — DONEPEZIL HYDROCHLORIDE 5 MG/1
5 TABLET, FILM COATED ORAL NIGHTLY
Qty: 30 TABLET | Refills: 0 | Status: SHIPPED | OUTPATIENT
Start: 2022-07-24 | End: 2022-08-21

## 2022-07-25 LAB
MAYO MISCELLANEOUS RESULT (REF): NORMAL
METHYLMALONATE SERPL-SCNC: 0.2 NMOL/ML

## 2022-07-25 RX ORDER — NIFEDIPINE 60 MG/1
60 TABLET, EXTENDED RELEASE ORAL DAILY
Qty: 90 TABLET | Refills: 0 | Status: SHIPPED | OUTPATIENT
Start: 2022-07-25 | End: 2022-08-01 | Stop reason: SDUPTHER

## 2022-07-25 RX ORDER — QUETIAPINE FUMARATE 25 MG/1
TABLET, FILM COATED ORAL
Qty: 90 TABLET | Refills: 1 | Status: SHIPPED | OUTPATIENT
Start: 2022-07-25 | End: 2023-08-09

## 2022-07-26 LAB
A-TOCOPHEROL VIT E SERPL-MCNC: 1610 UG/DL (ref 500–1800)
METHYLMALONATE SERPL-SCNC: 0.21 UMOL/L

## 2022-07-28 LAB
MAYO MISCELLANEOUS RESULT (REF): NORMAL
PS IGA SER-ACNC: <20 U/ML
PS IGG SER-ACNC: <10 U/ML
PS IGM SER-ACNC: <25 U/ML

## 2022-08-01 ENCOUNTER — HOSPITAL ENCOUNTER (OUTPATIENT)
Dept: RADIOLOGY | Facility: CLINIC | Age: 77
Discharge: HOME | End: 2022-08-01
Attending: INTERNAL MEDICINE
Payer: MEDICARE

## 2022-08-01 ENCOUNTER — OFFICE VISIT (OUTPATIENT)
Dept: INTERNAL MEDICINE | Facility: CLINIC | Age: 77
End: 2022-08-01
Payer: MEDICARE

## 2022-08-01 DIAGNOSIS — Z78.0 ASYMPTOMATIC MENOPAUSAL STATE: ICD-10-CM

## 2022-08-01 DIAGNOSIS — Z78.0 ASYMPTOMATIC MENOPAUSAL STATE: Primary | ICD-10-CM

## 2022-08-01 DIAGNOSIS — F03.90 DEMENTIA WITHOUT BEHAVIORAL DISTURBANCE, UNSPECIFIED DEMENTIA TYPE: ICD-10-CM

## 2022-08-01 DIAGNOSIS — I10 HYPERTENSION, UNSPECIFIED TYPE: ICD-10-CM

## 2022-08-01 PROCEDURE — 99999 PR PBB SHADOW E&M-EST. PATIENT-LVL III: CPT | Mod: PBBFAC,,, | Performed by: INTERNAL MEDICINE

## 2022-08-01 PROCEDURE — 99214 PR OFFICE/OUTPT VISIT, EST, LEVL IV, 30-39 MIN: ICD-10-PCS | Mod: S$PBB,,, | Performed by: INTERNAL MEDICINE

## 2022-08-01 PROCEDURE — 99214 OFFICE O/P EST MOD 30 MIN: CPT | Mod: S$PBB,,, | Performed by: INTERNAL MEDICINE

## 2022-08-01 PROCEDURE — 77080 DEXA BONE DENSITY SPINE HIP: ICD-10-PCS | Mod: 26,,, | Performed by: INTERNAL MEDICINE

## 2022-08-01 PROCEDURE — 77080 DXA BONE DENSITY AXIAL: CPT | Mod: 26,,, | Performed by: INTERNAL MEDICINE

## 2022-08-01 PROCEDURE — 77080 DXA BONE DENSITY AXIAL: CPT | Mod: TC

## 2022-08-01 PROCEDURE — 99999 PR PBB SHADOW E&M-EST. PATIENT-LVL III: ICD-10-PCS | Mod: PBBFAC,,, | Performed by: INTERNAL MEDICINE

## 2022-08-01 PROCEDURE — 99213 OFFICE O/P EST LOW 20 MIN: CPT | Mod: PBBFAC,25 | Performed by: INTERNAL MEDICINE

## 2022-08-01 RX ORDER — MELOXICAM 7.5 MG/1
7.5 TABLET ORAL DAILY PRN
Qty: 30 TABLET | Refills: 1 | Status: SHIPPED | OUTPATIENT
Start: 2022-08-01 | End: 2022-08-22 | Stop reason: SDUPTHER

## 2022-08-01 RX ORDER — NIFEDIPINE 60 MG/1
60 TABLET, EXTENDED RELEASE ORAL DAILY
Qty: 90 TABLET | Refills: 1 | Status: SHIPPED | OUTPATIENT
Start: 2022-08-01 | End: 2023-03-22 | Stop reason: SDUPTHER

## 2022-08-06 VITALS
DIASTOLIC BLOOD PRESSURE: 80 MMHG | HEART RATE: 78 BPM | WEIGHT: 134.25 LBS | TEMPERATURE: 99 F | HEIGHT: 64 IN | SYSTOLIC BLOOD PRESSURE: 136 MMHG | OXYGEN SATURATION: 99 % | BODY MASS INDEX: 22.92 KG/M2

## 2022-08-06 NOTE — PROGRESS NOTES
Subjective:       Patient ID: Mamta Bates is a 77 y.o. female.    Chief Complaint: Hypertension    HPI  She returns for management of hypertension.  She has had hypertension for over a year.  Current treatment has included medications outlined in medication list.  She denies chest pain or shortness of breath.  No palpitations.  Denies left arm or neck pain.  She has dementia.  Currently on Aricept    Past medical history:  Hypertension, fibromyalgia, dementia, status post hysterectomy    Medications:  Procardia 60 mg daily, Aricept 5 mg daily    No known drug allergies      Review of Systems   Constitutional: Negative for chills, fatigue, fever and unexpected weight change.   Respiratory: Negative for chest tightness and shortness of breath.    Cardiovascular: Negative for chest pain and palpitations.   Gastrointestinal: Negative for abdominal pain and blood in stool.   Neurological: Negative for dizziness, syncope, numbness and headaches.       Objective:      Physical Exam  HENT:      Right Ear: External ear normal.      Left Ear: External ear normal.      Nose: Nose normal.      Mouth/Throat:      Mouth: Mucous membranes are moist.      Pharynx: Oropharynx is clear.   Eyes:      Pupils: Pupils are equal, round, and reactive to light.   Cardiovascular:      Rate and Rhythm: Normal rate and regular rhythm.      Heart sounds: No murmur heard.  Pulmonary:      Breath sounds: Normal breath sounds.   Chest:   Breasts:      Right: No axillary adenopathy.      Left: No axillary adenopathy.       Abdominal:      General: There is no distension.      Palpations: There is no hepatomegaly or splenomegaly.      Tenderness: There is no abdominal tenderness.   Musculoskeletal:      Cervical back: Normal range of motion.   Lymphadenopathy:      Cervical: No cervical adenopathy.      Upper Body:      Right upper body: No axillary adenopathy.      Left upper body: No axillary adenopathy.   Neurological:      Cranial Nerves: No  cranial nerve deficit.      Sensory: No sensory deficit.      Motor: Motor function is intact.      Deep Tendon Reflexes: Reflexes are normal and symmetric.         Assessment/Plan       Assessment and plan:  1 hypertension:  Check BMP  2 dementia:  Continue Aricept  3. Schedule bone density.  Discussed Pap smear, pelvic exam, mammogram, breast exam, colonoscopy.  She declined all.

## 2022-08-22 ENCOUNTER — OFFICE VISIT (OUTPATIENT)
Dept: RHEUMATOLOGY | Facility: CLINIC | Age: 77
End: 2022-08-22
Payer: MEDICARE

## 2022-08-22 ENCOUNTER — HOSPITAL ENCOUNTER (OUTPATIENT)
Dept: RADIOLOGY | Facility: HOSPITAL | Age: 77
Discharge: HOME | End: 2022-08-22
Attending: STUDENT IN AN ORGANIZED HEALTH CARE EDUCATION/TRAINING PROGRAM
Payer: MEDICARE

## 2022-08-22 VITALS
DIASTOLIC BLOOD PRESSURE: 80 MMHG | SYSTOLIC BLOOD PRESSURE: 166 MMHG | WEIGHT: 137.56 LBS | BODY MASS INDEX: 23.61 KG/M2 | HEART RATE: 81 BPM

## 2022-08-22 DIAGNOSIS — G89.29 CHRONIC LEFT-SIDED LOW BACK PAIN WITHOUT SCIATICA: ICD-10-CM

## 2022-08-22 DIAGNOSIS — M54.50 CHRONIC LEFT-SIDED LOW BACK PAIN WITHOUT SCIATICA: ICD-10-CM

## 2022-08-22 DIAGNOSIS — G89.29 OTHER CHRONIC PAIN: ICD-10-CM

## 2022-08-22 DIAGNOSIS — G89.29 OTHER CHRONIC PAIN: Primary | ICD-10-CM

## 2022-08-22 DIAGNOSIS — M79.7 FIBROMYALGIA: ICD-10-CM

## 2022-08-22 PROCEDURE — 99215 OFFICE O/P EST HI 40 MIN: CPT | Mod: PBBFAC | Performed by: STUDENT IN AN ORGANIZED HEALTH CARE EDUCATION/TRAINING PROGRAM

## 2022-08-22 PROCEDURE — 99999 PR PBB SHADOW E&M-EST. PATIENT-LVL V: CPT | Mod: PBBFAC,GC,, | Performed by: STUDENT IN AN ORGANIZED HEALTH CARE EDUCATION/TRAINING PROGRAM

## 2022-08-22 PROCEDURE — 99205 OFFICE O/P NEW HI 60 MIN: CPT | Mod: S$PBB,GC,, | Performed by: STUDENT IN AN ORGANIZED HEALTH CARE EDUCATION/TRAINING PROGRAM

## 2022-08-22 PROCEDURE — 99205 PR OFFICE/OUTPT VISIT, NEW, LEVL V, 60-74 MIN: ICD-10-PCS | Mod: S$PBB,GC,, | Performed by: STUDENT IN AN ORGANIZED HEALTH CARE EDUCATION/TRAINING PROGRAM

## 2022-08-22 PROCEDURE — 99999 PR PBB SHADOW E&M-EST. PATIENT-LVL V: ICD-10-PCS | Mod: PBBFAC,GC,, | Performed by: STUDENT IN AN ORGANIZED HEALTH CARE EDUCATION/TRAINING PROGRAM

## 2022-08-22 PROCEDURE — 72100 X-RAY EXAM L-S SPINE 2/3 VWS: CPT | Mod: 26,,, | Performed by: RADIOLOGY

## 2022-08-22 PROCEDURE — 72100 X-RAY EXAM L-S SPINE 2/3 VWS: CPT | Mod: TC

## 2022-08-22 PROCEDURE — 72100 XR LUMBAR SPINE AP AND LATERAL: ICD-10-PCS | Mod: 26,,, | Performed by: RADIOLOGY

## 2022-08-22 RX ORDER — EZETIMIBE 10 MG/1
10 TABLET ORAL DAILY
COMMUNITY
Start: 2022-08-18 | End: 2024-03-14

## 2022-08-22 RX ORDER — MELOXICAM 7.5 MG/1
7.5 TABLET ORAL DAILY PRN
Qty: 30 TABLET | Refills: 0 | Status: SHIPPED | OUTPATIENT
Start: 2022-08-22 | End: 2022-08-30 | Stop reason: SDUPTHER

## 2022-08-22 NOTE — PROGRESS NOTES
"Subjective:       Patient ID: Mamta Bates is a 77 y.o. female.    Chief Complaint: Fibromyalgia    HPI   Patient is a 76 yo F with fibromyalgia and HTN who presents for Rheumatology consultation for fibromyalgia. She was apparently diagnosed with fibromyalgia in 2015 but how she got this diagnosis is unclear. She was apparently seeing Rheumatologist Dr. Mitchell around 2016 or so. She was recently evaluated by Neurology after a hospital encounter for acute encephalopathy due to hypertension (from medication noncompliance) which resolved after her blood pressure was controlled.    From note 5/2022: The patient is a 77-year-old female past medical history fibromyalgia, hypertension, hysterectomy who presents with her  for concern for altered mental status including slurred speech, pain to lower extremities, seeing lights, and not making sense. Patient's her  reports that he believes that she has had some early signs of dementia at getting worse over the past few months however the patient does not agree to seek any medical care until today.     From Neurologist Dr. Javier's note 7/24/22: "Ms. Bates's clinical presentation has features of both Behavioral variant Frontotemporal Dementia (bvFTD) (Rasmallikaky et al., Brain 2011) and Semantic variant Primary Progressive Aphasia (svPPA) (Henry ML, et al. Neurology. 2011). The pathology underlying Ms. 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 She complains of pain all over, mostly upper and lower back with pain radiating down the legs. Pain is worse with activity, worst at the end of the day. She gets muscle cramps at night. Her pain is worst on the left buttocks and left calf. She sometimes gets numbness in the feet, but not in the hands. She " complains about sometimes having left 4th trigger finger. She had a rhizotomy in the low back years ago at [facility]. She was taking meloxicam 7.5 mg one or two tablets daily, BC powder daily. She was taking tramadol but with her recent neurological changes, she was advised to stop it. Once a month she gets flare ups of the pain where it's bad enough that she gets short of breath and chest pain. She's now sleeping well through the night since starting donepezil and Seroquel. She's tried Cymbalta; apparently didn't tolerate it well. She tried Lyrica but it might not have helped. She never tried gabapentin due to reading about the side effects. She tried water exercises; not sure if it helped.    Social Hx: Never smoker, no alcohol use (never was much of a drinker), no drug use. 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  Family Hx: No family hx of autoimmune disease. There is DM in the family.    Widespread pain index  Note the areas which the patient has had pain over the last week:                   Shoulder-girdle, left x               Shoulder-girdle, right x                         Upper arm left                        Upper arm right                         Lower arm left                       Lower arm right    Hip (buttock, trochanter) left x  Hip (buttock, trochanter) right x                           Upper leg, left                          Upper leg, right                           Lower leg, left x                         Lower leg, right x                                     Jaw, left                                    Jaw, right                                        Chest                                   Abdomen                               Upper back x                              Lower back x                                        Neck   Score will be from 0-19: 8                                         Symptom severity score  Fatigue 1  Waking  Unrefreshed 1  Cognitive Symptoms    0 = no problem, 1=slight or mild problem 2= moderate; considerable problems often present and/or at a moderate level, 3 = severe, pervasive, continuous, life disturbing problem  For each of the 3 symptoms, indicate the level of severity over the past week using the Scale.  The symptom severity score is the sum of the severity of the 3 symptoms (fatigue, waking unrefreshed, and cognitive symptoms) plus the number of the following symptoms occurring during the previous 6 months:   Headaches 0  Pain or cramps in the lower abdomen 0  Depression 0  The final score is between 0 and 12: 2                                          Criteria  Patient has fibromyalgia if the following 3 conditions are met:  1.  Widespread pain index greater than or equal to 7 and symptom severity score greater than or equal to 5 or widespread pain index between 3- 6, and symptom severity score greater than or equal to 9.    2.  Symptoms have been present in a similar level for at least 3 months  3.  The patient does not have a disorder that would otherwise sufficiently explain the pain          Review of Systems   Constitutional: Negative for fever and unexpected weight change.   HENT: Negative for mouth sores and trouble swallowing.    Eyes: Negative for redness.   Respiratory: Negative for cough and shortness of breath.    Cardiovascular: Negative for chest pain.   Gastrointestinal: Negative for constipation and diarrhea.   Genitourinary: Negative for dysuria and genital sores.   Musculoskeletal: Positive for back pain.   Skin: Negative for rash.   Neurological: Positive for numbness. Negative for headaches.   Hematological: Does not bruise/bleed easily.         Objective:   BP (!) 166/80   Pulse 81   Wt 62.4 kg (137 lb 9.1 oz)   BMI 23.61 kg/m²      Physical Exam   Constitutional: She is oriented to person, place, and time. No distress.   HENT:   Head: Normocephalic and atraumatic.   Eyes: Pupils are  equal, round, and reactive to light.   Cardiovascular: Normal rate and regular rhythm.   No murmur heard.  Pulmonary/Chest: Effort normal and breath sounds normal. No respiratory distress. She has no wheezes.   Abdominal: Soft. Bowel sounds are normal. There is no abdominal tenderness.   Musculoskeletal:         General: No deformity.      Right shoulder: Normal.      Left shoulder: Normal.      Right elbow: Normal.      Left elbow: Normal.      Right wrist: Normal.      Left wrist: Normal.      Cervical back: Normal range of motion.      Right knee: Normal.      Left knee: Normal.      Comments: No synovitis or joint tenderness. Low back pain is better with palpation rather than worse. Full ROM intact without pain in neck, shoulders, elbows, wrists, low back, hips, knees. Straight leg test is negative bilaterally.   Neurological: She is alert and oriented to person, place, and time.   Skin: Skin is warm and dry.   Psychiatric: Affect and judgment normal.       Right Side Rheumatological Exam     Examination finds the shoulder, elbow, wrist, knee, 1st PIP, 1st MCP, 2nd PIP, 2nd MCP, 3rd PIP, 3rd MCP, 4th PIP, 4th MCP, 5th PIP and 5th MCP normal.    Muscle Strength (0-5 scale):  Neck Flexion:  5  Neck Extension: 5  Deltoid:  5  Biceps: 5/5   Triceps:  5  : 5/5   Iliopsoas: 5  Quadriceps:  5   Distal Lower Extremity: 5    Left Side Rheumatological Exam     Examination finds the shoulder, elbow, wrist, knee, 1st PIP, 1st MCP, 2nd PIP, 2nd MCP, 3rd PIP, 3rd MCP, 4th PIP, 4th MCP, 5th PIP and 5th MCP normal.    Muscle Strength (0-5 scale):  Neck Flexion:  5  Neck Extension: 5  Deltoid:  5  Biceps: 5/5   Triceps:  5  :  5/5   Iliopsoas: 5  Quadriceps:  5   Distal Lower Extremity: 5           No data to display     Assessment:       1. Other chronic pain    2. Fibromyalgia    3. Chronic left-sided low back pain without sciatica            Plan:       Problem List Items Addressed This Visit        Orthopedic     Fibromyalgia      Other Visit Diagnoses     Other chronic pain    -  Primary    Relevant Orders    X-Ray Lumbar Spine AP And Lateral    Ambulatory referral/consult to Pain Clinic    Ambulatory referral/consult to Physical Medicine Rehab    Chronic left-sided low back pain without sciatica        Relevant Orders    X-Ray Lumbar Spine AP And Lateral    Ambulatory referral/consult to Pain Clinic    Ambulatory referral/consult to Physical Medicine Rehab        Patient is a 78 yo F with fibromyalgia and HTN who presents for Rheumatology consultation for fibromyalgia.     No signs or symptoms of a rheumatic disease such as RA. No joint pain. She does not currently meet criteria for fibromyalgia, though the symptom severity score is difficult to determine given her cognitive symptoms worsening with dementia. She doesn't have fatigue with activity. She doesn't have classic tender points. Her pain improves rather than worsens with palpation; she might have muscle tension contributing to her pain.     Plan:  1. XR low back  2. Refer Pain Clinic  3. Refer PM&R for chronic pain      Elisabeth Foy MD  Rheumatology Fellow  PGY-5

## 2022-08-29 ENCOUNTER — TELEPHONE (OUTPATIENT)
Dept: SPINE | Facility: CLINIC | Age: 77
End: 2022-08-29
Payer: MEDICARE

## 2022-08-29 NOTE — PROGRESS NOTES
Subjective:      Patient ID: Mamta Bates is a 77 y.o. female.    Chief Complaint: No chief complaint on file.    Ms bates is a 76 yo female sent in consultation by Dr. Foy for evaluation of low back pain.  She has been having back pain for years.  She has good days and bad days.  She will stretch and that helps.  She does not have back pain constantly. She feels like bending will bother her more.  She will have pain when wakes up in morning.  She will have pain at anytime.  She has been treated for fibromyalgia and tramadol but that was stopped she has been taking mobic.  The mobic is not helping.  The pain is the whole back.  0/10 now, worst 9/10 yesterday hard to breathe and and she took hot bath and did inversion table, best 0/10.  Sometimes the pain is under the shoulder blades.  She lives on heating pad, uses a massage machine.  She stays on heating pad for many hours.  She has done PT in past, she has done water aerobics in past.  It is a hard to make appointments with dementia.      X-ray lumbar 8/22/2022  20 degree or so lumbar dextrocurvature.  No acute fractures visualized lower thoracic or lumbar vertebral segments, preserved vertebral body heights and pedicles.  Five non rib-bearing lumbar vertebral segments.  No spondylolysis.  Grade 1 anterolisthesis L3 on L4 and L4 on L5..  Moderate to severe disc narrowing L3-L4 level with opposing endplate sclerosis and vacuum phenomenon.  Moderate to severe disc narrowing L4-L5 level.  Moderate to severe disc narrowing and vacuum phenomenon L5-S1 level.  Multilevel lumbar facet arthropathic changes greatest about the lower 3 levels.  Some abdominal aortic wall vascular calcification.  Intact right and left SI joints and visualized hip joint spaces.     Impression:     As above.    Past Medical History:  No date: Fibromyalgia  No date: Hypertension    Past Surgical History:  No date: HYSTERECTOMY    Review of patient's family history indicates:      Social  History    Socioeconomic History      Marital status:     Tobacco Use      Smoking status: Never      Smokeless tobacco: Never    Substance and Sexual Activity      Alcohol use: No      Drug use: No      Sexual activity: Yes        Partners: Male      Current Outpatient Medications:  donepeziL (ARICEPT) 10 MG tablet, Take 1 tablet (10 mg total) by mouth once daily., Disp: 90 tablet, Rfl: 3  ezetimibe (ZETIA) 10 mg tablet, Take 10 mg by mouth once daily., Disp: , Rfl:   guaiFENesin 100 mg/5 ml (ROBITUSSIN) 100 mg/5 mL syrup, Take 200 mg by mouth 3 (three) times daily as needed for Cough., Disp: , Rfl:   magnesium oxide (MAG-OX) 400 mg (241.3 mg magnesium) tablet, Take 400 mg by mouth once daily., Disp: , Rfl:   meloxicam (MOBIC) 7.5 MG tablet, Take 1 tablet (7.5 mg total) by mouth daily as needed., Disp: 30 tablet, Rfl: 0  NIFEdipine (PROCARDIA-XL) 60 MG (OSM) 24 hr tablet, Take 1 tablet (60 mg total) by mouth once daily., Disp: 90 tablet, Rfl: 1  QUEtiapine (SEROQUEL) 25 MG Tab, Take 1/2 tablet by mouth every evening, Disp: 90 tablet, Rfl: 1    No current facility-administered medications for this visit.      Review of patient's allergies indicates:  No Known Allergies      Review of Systems   Constitutional: Negative for weight gain and weight loss.   Cardiovascular:  Negative for chest pain.   Respiratory:  Positive for shortness of breath.    Musculoskeletal:  Positive for back pain. Negative for joint pain and joint swelling.   Gastrointestinal:  Negative for abdominal pain, bowel incontinence, nausea and vomiting.   Genitourinary:  Negative for bladder incontinence.   Neurological:  Positive for numbness (foot). Negative for paresthesias.       Objective:        General: Mamta is well-developed, well-nourished, appears stated age, in no acute distress, alert and oriented to time, place and person.     General    Vitals reviewed.  Constitutional: She is oriented to person, place, and time. She appears  well-developed and well-nourished.   HENT:   Head: Normocephalic and atraumatic.   Pulmonary/Chest: Effort normal.   Neurological: She is alert and oriented to person, place, and time.   Psychiatric: She has a normal mood and affect. Her behavior is normal. Judgment and thought content normal.     General Musculoskeletal Exam   Gait: normal     Right Ankle/Foot Exam     Tests   Heel Walk: able to perform  Tiptoe Walk: able to perform    Left Ankle/Foot Exam     Tests   Heel Walk: able to perform  Tiptoe Walk: able to perform  Back (L-Spine & T-Spine) / Neck (C-Spine) Exam     Back (L-Spine & T-Spine) Range of Motion   Extension:  30   Flexion:  90   Lateral bend right:  20   Lateral bend left:  20     Spinal Sensation   Right Side Sensation  C-Spine Level: normal   L-Spine Level: normal  S-Spine Level: normal  Left Side Sensation  C-Spine Level: normal  L-Spine Level: normal  S-Spine Level: normal    Back (L-Spine & T-Spine) Tests   Right Side Tests  Straight leg raise:        Sitting SLR: > 70 degrees    Left Side Tests  Straight leg raise:       Sitting SLR: > 70 degrees      Other   She has no scoliosis .  Spinal Kyphosis:  Absent      Muscle Strength   Right Upper Extremity   Biceps: 5/5   Deltoid:  5/5  Triceps:  5/5  Wrist extension: 5/5   Finger Flexors:  5/5  Left Upper Extremity  Biceps: 5/5   Deltoid:  5/5  Triceps:  5/5  Wrist extension: 5/5   Finger Flexors:  5/5  Right Lower Extremity   Hip Flexion: 5/5   Quadriceps:  5/5   Anterior tibial:  5/5   EHL:  5/5  Left Lower Extremity   Hip Flexion: 5/5   Quadriceps:  5/5   Anterior tibial:  5/5   EHL:  5/5    Reflexes     Left Side  Biceps:  2+  Triceps:  2+  Brachioradialis:  2+  Achilles:  2+  Left Millard's Sign:  Absent  Babinski Sign:  absent  Quadriceps:  2+    Right Side   Biceps:  2+  Triceps:  2+  Brachioradialis:  2+  Achilles:  2+  Right Millard's Sign:  absent  Babinski Sign:  absent  Quadriceps:  2+    Vascular Exam     Right  Pulses        Carotid:                  2+    Left Pulses        Carotid:                  2+            Assessment:       1. Myofascial pain syndrome    2. Other chronic pain    3. Chronic left-sided low back pain without sciatica    4. Spondylosis of lumbar region without myelopathy or radiculopathy    5. Cognitive impairment           Plan:       Orders Placed This Encounter    tiZANidine (ZANAFLEX) 2 MG tablet    meloxicam (MOBIC) 7.5 MG tablet     We discussed back pain and the nature of back pain.  We discussed that it is not one thing that causes the pain but an accumulation of multiple things that we do.  We discussed dementia.  She is not able to express what makes it better or worse.  She is having a good day and not having any pain.  She moves well with great ROM of the spine.  She is not a good historian  We discussed the benefits of therapy and exercise and continuing to move. We discussed the importance of daily exercise.  She use to go to the gym, they have equipment at home and her  is going to work on getting her exercising daily  Tizanidine 1-2mg po TID as needed for back pain and muscle pain  Continue meloxicam 7.5mg po Qday  We discussed PT but her  has trouble getting her to appointments  MRI of the lumbar spine and injections.  Hard to make appointments and they are not interested in injections.  Her pain is not specific, and tends to vary and seems more muscle  We discussed walk with a doc that is happening this weekend.  Her  is going to bring her.  It is very hard to get her out of the house.   RTC PRN, it is very hard to get to appointments with dementia.      More than 50% of the total time  of 45 minutes was spent face to face in counseling on diagnosis and treatment options. I also counseled patient  on common and most usual side effect of prescribed medications.  I reviewed Primary care , and other specialty's notes to better coordinate patient's care. All questions  were answered, and patient voiced understanding.         Follow-up: No follow-ups on file. If there are any questions prior to this, the patient was instructed to contact the office.

## 2022-08-29 NOTE — TELEPHONE ENCOUNTER
This message is for patient in regards to his or hers appointment 08/30/22 at 9:00 am with Dr.Christine Ana M.D.    On the 4th floor [address] in the [facility]. We do ask that patients arrive 15 minutes before appointment time.  Patient may contact [phone] if there is any questions or concerns. Thank you for entrusting in [facility] with your care.

## 2022-08-30 ENCOUNTER — OFFICE VISIT (OUTPATIENT)
Dept: SPINE | Facility: CLINIC | Age: 77
End: 2022-08-30
Attending: PHYSICAL MEDICINE & REHABILITATION
Payer: MEDICARE

## 2022-08-30 VITALS
SYSTOLIC BLOOD PRESSURE: 172 MMHG | DIASTOLIC BLOOD PRESSURE: 92 MMHG | TEMPERATURE: 98 F | HEIGHT: 64 IN | HEART RATE: 92 BPM | WEIGHT: 134.5 LBS | BODY MASS INDEX: 22.96 KG/M2

## 2022-08-30 DIAGNOSIS — G89.29 CHRONIC LEFT-SIDED LOW BACK PAIN WITHOUT SCIATICA: ICD-10-CM

## 2022-08-30 DIAGNOSIS — G89.29 OTHER CHRONIC PAIN: ICD-10-CM

## 2022-08-30 DIAGNOSIS — M79.18 MYOFASCIAL PAIN SYNDROME: Primary | ICD-10-CM

## 2022-08-30 DIAGNOSIS — R41.89 COGNITIVE IMPAIRMENT: ICD-10-CM

## 2022-08-30 DIAGNOSIS — M54.50 CHRONIC LEFT-SIDED LOW BACK PAIN WITHOUT SCIATICA: ICD-10-CM

## 2022-08-30 DIAGNOSIS — M47.816 SPONDYLOSIS OF LUMBAR REGION WITHOUT MYELOPATHY OR RADICULOPATHY: ICD-10-CM

## 2022-08-30 PROCEDURE — 99204 OFFICE O/P NEW MOD 45 MIN: CPT | Mod: S$PBB,,, | Performed by: PHYSICAL MEDICINE & REHABILITATION

## 2022-08-30 PROCEDURE — 99204 PR OFFICE/OUTPT VISIT, NEW, LEVL IV, 45-59 MIN: ICD-10-PCS | Mod: S$PBB,,, | Performed by: PHYSICAL MEDICINE & REHABILITATION

## 2022-08-30 PROCEDURE — 99999 PR PBB SHADOW E&M-EST. PATIENT-LVL III: ICD-10-PCS | Mod: PBBFAC,,, | Performed by: PHYSICAL MEDICINE & REHABILITATION

## 2022-08-30 PROCEDURE — 99213 OFFICE O/P EST LOW 20 MIN: CPT | Mod: PBBFAC | Performed by: PHYSICAL MEDICINE & REHABILITATION

## 2022-08-30 PROCEDURE — 99999 PR PBB SHADOW E&M-EST. PATIENT-LVL III: CPT | Mod: PBBFAC,,, | Performed by: PHYSICAL MEDICINE & REHABILITATION

## 2022-08-30 RX ORDER — MELOXICAM 7.5 MG/1
7.5 TABLET ORAL DAILY PRN
Qty: 30 TABLET | Refills: 2 | Status: SHIPPED | OUTPATIENT
Start: 2022-08-30 | End: 2023-06-01 | Stop reason: SDUPTHER

## 2022-08-30 RX ORDER — TIZANIDINE 2 MG/1
1-2 TABLET ORAL EVERY 8 HOURS PRN
Qty: 90 TABLET | Refills: 2 | Status: SHIPPED | OUTPATIENT
Start: 2022-08-30 | End: 2022-11-02 | Stop reason: SDUPTHER

## 2022-09-02 ENCOUNTER — PATIENT MESSAGE (OUTPATIENT)
Dept: SPINE | Facility: CLINIC | Age: 77
End: 2022-09-02
Payer: MEDICARE

## 2022-09-14 ENCOUNTER — PATIENT MESSAGE (OUTPATIENT)
Dept: INTERNAL MEDICINE | Facility: CLINIC | Age: 77
End: 2022-09-14
Payer: MEDICARE

## 2022-09-14 DIAGNOSIS — G89.29 OTHER CHRONIC PAIN: Primary | ICD-10-CM

## 2022-09-20 ENCOUNTER — OFFICE VISIT (OUTPATIENT)
Dept: PAIN MEDICINE | Facility: CLINIC | Age: 77
End: 2022-09-20
Payer: MEDICARE

## 2022-09-20 VITALS
HEIGHT: 64 IN | DIASTOLIC BLOOD PRESSURE: 75 MMHG | HEART RATE: 70 BPM | BODY MASS INDEX: 23.04 KG/M2 | WEIGHT: 134.94 LBS | SYSTOLIC BLOOD PRESSURE: 152 MMHG

## 2022-09-20 DIAGNOSIS — M54.9 DORSALGIA, UNSPECIFIED: ICD-10-CM

## 2022-09-20 DIAGNOSIS — G89.29 OTHER CHRONIC PAIN: ICD-10-CM

## 2022-09-20 DIAGNOSIS — M51.36 DDD (DEGENERATIVE DISC DISEASE), LUMBAR: Primary | ICD-10-CM

## 2022-09-20 DIAGNOSIS — M47.816 LUMBAR SPONDYLOSIS: ICD-10-CM

## 2022-09-20 DIAGNOSIS — M54.16 LUMBAR RADICULOPATHY: ICD-10-CM

## 2022-09-20 PROBLEM — M51.369 DDD (DEGENERATIVE DISC DISEASE), LUMBAR: Status: ACTIVE | Noted: 2022-09-20

## 2022-09-20 PROCEDURE — 99204 OFFICE O/P NEW MOD 45 MIN: CPT | Mod: S$PBB,,, | Performed by: PAIN MEDICINE

## 2022-09-20 PROCEDURE — 99204 PR OFFICE/OUTPT VISIT, NEW, LEVL IV, 45-59 MIN: ICD-10-PCS | Mod: S$PBB,,, | Performed by: PAIN MEDICINE

## 2022-09-20 PROCEDURE — 99999 PR PBB SHADOW E&M-EST. PATIENT-LVL IV: ICD-10-PCS | Mod: PBBFAC,,, | Performed by: PAIN MEDICINE

## 2022-09-20 PROCEDURE — 99214 OFFICE O/P EST MOD 30 MIN: CPT | Mod: PBBFAC,PN | Performed by: PAIN MEDICINE

## 2022-09-20 PROCEDURE — 99999 PR PBB SHADOW E&M-EST. PATIENT-LVL IV: CPT | Mod: PBBFAC,,, | Performed by: PAIN MEDICINE

## 2022-09-20 NOTE — PROGRESS NOTES
Subjective:     Patient ID: Mamta Bates is a 77 y.o. female    Chief Complaint: Low-back Pain (Referred by Dr Ibeth Harris for other chronic pain and LBP)      Referred by: Ibeth Harris MD      HPI:    Initial Encounter (9/20/22):  Mamta Bates is a 77 y.o. female who presents today with chronic low back and lower extremity pain.  This pain has been present for years.  Patient states that she has been diagnosed with fibromyalgia in the past though this diagnosis has been disputed.  Pain is intermittent.  States that the pain can get very severe at times.  During these times it is difficult for her to be active at all.  Pain is located across the lower lumbar region.  The pain will radiate to lower extremities to her calves.  She reports having associated cramps of the lower extremities.  She denies any associated numbness, tingling, weakness, bowel bladder dysfunction.   This pain is described in detail below.    Physical Therapy:  Yes.  Remains active.    Non-pharmacologic Treatment:  Rest helps         TENS?  No    Pain Medications:         Currently taking:  Meloxicam, tizanidine    Has tried in the past:  NSAIDs, Tylenol    Has not tried:  Opioids, TCAs, SNRIs, anticonvulsants, topical creams    Blood thinners:  None    Interventional Therapies:  None    Relevant Surgeries:  None    Affecting sleep?  Yes    Affecting daily activities? yes    Depressive symptoms? no          SI/HI? No    Work status: Retired    Pain Scores:    Best:       2/10  Worst:     9/10  Usually:   8/10  Today:    5/10    Pain Disability Index  Family/Home Responsibilities:: 9  Recreation:: 8  Social Activity:: 8  Occupation:: 0  Sexual Behavior:: 0  Self Care:: 3  Life-Support Activities:: 2  Pain Disability Index (PDI): 30    Review of Systems   Constitutional:  Negative for activity change, appetite change, chills, fatigue, fever and unexpected weight change.   HENT:  Negative for hearing loss.    Eyes:  Negative for visual  disturbance.   Respiratory:  Negative for chest tightness and shortness of breath.    Cardiovascular:  Negative for chest pain.   Gastrointestinal:  Negative for abdominal pain, constipation, diarrhea, nausea and vomiting.   Genitourinary:  Negative for difficulty urinating.   Musculoskeletal:  Positive for back pain, gait problem and myalgias. Negative for arthralgias, neck pain and neck stiffness.   Skin:  Negative for rash.   Neurological:  Negative for dizziness, weakness, light-headedness, numbness and headaches.   Psychiatric/Behavioral:  Positive for sleep disturbance. Negative for hallucinations and suicidal ideas. The patient is not nervous/anxious.      Past Medical History:   Diagnosis Date    Fibromyalgia     Hypertension        Past Surgical History:   Procedure Laterality Date    HYSTERECTOMY         Social History     Socioeconomic History    Marital status:    Tobacco Use    Smoking status: Never    Smokeless tobacco: Never   Substance and Sexual Activity    Alcohol use: No    Drug use: No    Sexual activity: Yes     Partners: Male       Review of patient's allergies indicates:  No Known Allergies    Current Outpatient Medications on File Prior to Visit   Medication Sig Dispense Refill    donepeziL (ARICEPT) 10 MG tablet Take 1 tablet (10 mg total) by mouth once daily. 90 tablet 3    ezetimibe (ZETIA) 10 mg tablet Take 10 mg by mouth once daily.      guaiFENesin 100 mg/5 ml (ROBITUSSIN) 100 mg/5 mL syrup Take 200 mg by mouth 3 (three) times daily as needed for Cough.      magnesium oxide (MAG-OX) 400 mg (241.3 mg magnesium) tablet Take 400 mg by mouth once daily.      meloxicam (MOBIC) 7.5 MG tablet Take 1 tablet (7.5 mg total) by mouth daily as needed for Pain. 30 tablet 2    NIFEdipine (PROCARDIA-XL) 60 MG (OSM) 24 hr tablet Take 1 tablet (60 mg total) by mouth once daily. 90 tablet 1    QUEtiapine (SEROQUEL) 25 MG Tab Take 1/2 tablet by mouth every evening 90 tablet 1    tiZANidine  "(ZANAFLEX) 2 MG tablet Take 0.5-1 tablets (1-2 mg total) by mouth every 8 (eight) hours as needed (back pain). 90 tablet 2     No current facility-administered medications on file prior to visit.       Objective:      BP (!) 152/75 (BP Location: Left arm, Patient Position: Sitting, BP Method: Medium (Automatic))   Pulse 70   Ht 5' 4" (1.626 m)   Wt 61.2 kg (134 lb 14.7 oz)   BMI 23.16 kg/m²     Exam:  GEN:  Well developed, well nourished.  No acute distress.  Normal pain behavior.  HEENT:  No trauma.  Mucous membranes moist.  Nares patent bilaterally.  PSYCH: Normal affect. Thought content appropriate.  CHEST:  Breathing symmetric.  No audible wheezing.  ABD: Soft, non-distended.  SKIN:  Warm, pink, dry.  No rash on exposed areas.    EXT:  No cyanosis, clubbing, or edema.  No color change or changes in nail or hair growth.  NEURO/MUSCULOSKELETAL:  Fully alert, oriented, and appropriate. Speech normal zuri. No cranial nerve deficits.   Gait:  Normal.  No trendelenburg sign bilaterally.   Motor Strength:  5/5 motor strength throughout lower extremities.   Sensory:  No sensory deficit in the lower extremities.   Reflexes:  1 + and symmetric throughout.  Downgoing Babinski's bilaterally.  No clonus or spasticity.  L-Spine:  Full ROM with pain on extension.  Positive pain with axial/facet loading bilaterally.  Negative SLR bilaterally.    No TTP over lumbar paraspinals, bilateral SI joints, hips, piriformis muscles, or GTB.        Imaging:    Narrative & Impression    EXAMINATION:  XR LUMBAR SPINE AP AND LATERAL     CLINICAL HISTORY:  Other chronic pain     TECHNIQUE:  AP, lateral and spot images were performed of the lumbar spine.     COMPARISON:  None     FINDINGS:  20 degree or so lumbar dextrocurvature.  No acute fractures visualized lower thoracic or lumbar vertebral segments, preserved vertebral body heights and pedicles.  Five non rib-bearing lumbar vertebral segments.  No spondylolysis.  Grade 1 " anterolisthesis L3 on L4 and L4 on L5..  Moderate to severe disc narrowing L3-L4 level with opposing endplate sclerosis and vacuum phenomenon.  Moderate to severe disc narrowing L4-L5 level.  Moderate to severe disc narrowing and vacuum phenomenon L5-S1 level.  Multilevel lumbar facet arthropathic changes greatest about the lower 3 levels.  Some abdominal aortic wall vascular calcification.  Intact right and left SI joints and visualized hip joint spaces.     Impression:     As above.        Electronically signed by: Octavio Cornelius  Date:                                            08/22/2022  Time:                                           11:22       Assessment:       Encounter Diagnoses   Name Primary?    Other chronic pain     DDD (degenerative disc disease), lumbar Yes    Lumbar spondylosis     Lumbar radiculopathy     Dorsalgia, unspecified          Plan:       Mamta was seen today for low-back pain.    Diagnoses and all orders for this visit:    DDD (degenerative disc disease), lumbar  -     MRI Lumbar Spine Without Contrast; Future    Other chronic pain  -     Ambulatory referral/consult to Pain Clinic    Lumbar spondylosis  -     MRI Lumbar Spine Without Contrast; Future    Lumbar radiculopathy  -     MRI Lumbar Spine Without Contrast; Future    Dorsalgia, unspecified  -     MRI Lumbar Spine Without Contrast; Future      Mamta Bates is a 77 y.o. female with chronic bilateral low back and lower extremity pain.  Symptoms are fairly nonspecific and exact etiology of pain is unclear.  I suspect she might have some degree of lumbar facet pain.  This may explain some of her lower extremity pain.  Can not rule out radicular pain though no overt neurologic deficits noted on examination in quality/distribution of lower extremity pain not very consistent with radiculopathy.    Pertinent imaging studies reviewed by me. Imaging results were discussed with patient.  Lumbar MRI to better evaluate for lumbar  pathology.  Return to clinic after MRI to review results.  May consider interventional procedures if appropriate.            This note was created by combination of typed  and M-Modal dictation. Transcription and phonetic errors may be present.  If there are any questions, please contact me.

## 2022-11-02 RX ORDER — TIZANIDINE 2 MG/1
1-2 TABLET ORAL EVERY 8 HOURS PRN
Qty: 90 TABLET | Refills: 2 | Status: SHIPPED | OUTPATIENT
Start: 2022-11-02

## 2023-01-03 ENCOUNTER — TELEPHONE (OUTPATIENT)
Dept: PHYSICAL MEDICINE AND REHAB | Facility: CLINIC | Age: 78
End: 2023-01-03
Payer: MEDICARE

## 2023-01-03 NOTE — TELEPHONE ENCOUNTER
Patient son Mr Noe Mcmanus came to the office today, [phone].  He is the caregiver for his mother.  He has been summons to Jury Duty for 01/06/23.  Mr Mcmanus came to the office with his Jury Duty form to have filled out asking to excuse him.  I gave him a copy of the form and informed him that myself or someone from Dr Javier office will contact him  in reference to the results.

## 2023-01-05 ENCOUNTER — PATIENT MESSAGE (OUTPATIENT)
Dept: NEUROLOGY | Facility: CLINIC | Age: 78
End: 2023-01-05
Payer: MEDICARE

## 2023-01-05 ENCOUNTER — TELEPHONE (OUTPATIENT)
Dept: PHYSICAL MEDICINE AND REHAB | Facility: CLINIC | Age: 78
End: 2023-01-05
Payer: MEDICARE

## 2023-01-05 NOTE — TELEPHONE ENCOUNTER
MD Leslie Chamberlain MA  Caller: Unspecified (Today,  8:54 AM)  I cannot fill out the form he provided but will provide a letter. I have not had an opportunity - it will likely have to wait till Friday   [place]

## 2023-01-05 NOTE — TELEPHONE ENCOUNTER
Patients' son Yonathan Liu came to the office on 01/03/22 asking to fill out a Jury Duty form to excuse him from Jury Duty.  Mr Mcmanus is his mothers' care giver and is unable to attend.     The form is due 01/06/23, Mr Mcmanus asked to fax it to the number on the form.

## 2023-01-11 ENCOUNTER — PATIENT MESSAGE (OUTPATIENT)
Dept: NEUROLOGY | Facility: CLINIC | Age: 78
End: 2023-01-11
Payer: MEDICARE

## 2023-03-14 ENCOUNTER — TELEPHONE (OUTPATIENT)
Dept: FAMILY MEDICINE | Facility: CLINIC | Age: 78
End: 2023-03-14
Payer: MEDICARE

## 2023-03-22 ENCOUNTER — LAB VISIT (OUTPATIENT)
Dept: LAB | Facility: HOSPITAL | Age: 78
End: 2023-03-22
Attending: INTERNAL MEDICINE
Payer: MEDICARE

## 2023-03-22 ENCOUNTER — OFFICE VISIT (OUTPATIENT)
Dept: INTERNAL MEDICINE | Facility: CLINIC | Age: 78
End: 2023-03-22
Payer: MEDICARE

## 2023-03-22 ENCOUNTER — IMMUNIZATION (OUTPATIENT)
Dept: INTERNAL MEDICINE | Facility: CLINIC | Age: 78
End: 2023-03-22
Payer: MEDICARE

## 2023-03-22 DIAGNOSIS — E72.19 OTHER DISORDERS OF SULFUR-BEARING AMINO-ACID METABOLISM: ICD-10-CM

## 2023-03-22 DIAGNOSIS — I10 HYPERTENSION, UNSPECIFIED TYPE: Primary | ICD-10-CM

## 2023-03-22 DIAGNOSIS — I10 HYPERTENSION, UNSPECIFIED TYPE: ICD-10-CM

## 2023-03-22 DIAGNOSIS — Z23 NEED FOR VACCINATION: Primary | ICD-10-CM

## 2023-03-22 DIAGNOSIS — I70.0 AORTIC ATHEROSCLEROSIS: ICD-10-CM

## 2023-03-22 DIAGNOSIS — M79.7 FIBROMYALGIA: ICD-10-CM

## 2023-03-22 LAB
ALBUMIN SERPL BCP-MCNC: 4.2 G/DL (ref 3.5–5.2)
ALP SERPL-CCNC: 97 U/L (ref 55–135)
ALT SERPL W/O P-5'-P-CCNC: 15 U/L (ref 10–44)
ANION GAP SERPL CALC-SCNC: 8 MMOL/L (ref 8–16)
AST SERPL-CCNC: 26 U/L (ref 10–40)
BILIRUB SERPL-MCNC: 0.6 MG/DL (ref 0.1–1)
BUN SERPL-MCNC: 15 MG/DL (ref 8–23)
CALCIUM SERPL-MCNC: 10 MG/DL (ref 8.7–10.5)
CHLORIDE SERPL-SCNC: 110 MMOL/L (ref 95–110)
CO2 SERPL-SCNC: 27 MMOL/L (ref 23–29)
CREAT SERPL-MCNC: 0.9 MG/DL (ref 0.5–1.4)
EST. GFR  (NO RACE VARIABLE): >60 ML/MIN/1.73 M^2
GLUCOSE SERPL-MCNC: 76 MG/DL (ref 70–110)
POTASSIUM SERPL-SCNC: 3.5 MMOL/L (ref 3.5–5.1)
PROT SERPL-MCNC: 7.9 G/DL (ref 6–8.4)
SODIUM SERPL-SCNC: 145 MMOL/L (ref 136–145)

## 2023-03-22 PROCEDURE — 99214 OFFICE O/P EST MOD 30 MIN: CPT | Mod: PBBFAC,25 | Performed by: INTERNAL MEDICINE

## 2023-03-22 PROCEDURE — 36415 COLL VENOUS BLD VENIPUNCTURE: CPT | Performed by: INTERNAL MEDICINE

## 2023-03-22 PROCEDURE — 99999 PR PBB SHADOW E&M-EST. PATIENT-LVL IV: ICD-10-PCS | Mod: PBBFAC,,, | Performed by: INTERNAL MEDICINE

## 2023-03-22 PROCEDURE — 80053 COMPREHEN METABOLIC PANEL: CPT | Performed by: INTERNAL MEDICINE

## 2023-03-22 PROCEDURE — 99999 PR PBB SHADOW E&M-EST. PATIENT-LVL IV: CPT | Mod: PBBFAC,,, | Performed by: INTERNAL MEDICINE

## 2023-03-22 PROCEDURE — 99214 PR OFFICE/OUTPT VISIT, EST, LEVL IV, 30-39 MIN: ICD-10-PCS | Mod: S$PBB,,, | Performed by: INTERNAL MEDICINE

## 2023-03-22 PROCEDURE — 0124A COVID-19, MRNA, LNP-S, BIVALENT BOOSTER, PF, 30 MCG/0.3 ML DOSE: CPT | Mod: PBBFAC,CV19

## 2023-03-22 PROCEDURE — 99214 OFFICE O/P EST MOD 30 MIN: CPT | Mod: S$PBB,,, | Performed by: INTERNAL MEDICINE

## 2023-03-22 PROCEDURE — 91312 COVID-19, MRNA, LNP-S, BIVALENT BOOSTER, PF, 30 MCG/0.3 ML DOSE: CPT | Mod: PBBFAC

## 2023-03-22 RX ORDER — NIFEDIPINE 60 MG/1
60 TABLET, EXTENDED RELEASE ORAL DAILY
Qty: 90 TABLET | Refills: 1 | Status: SHIPPED | OUTPATIENT
Start: 2023-03-22 | End: 2024-03-06 | Stop reason: SDUPTHER

## 2023-03-25 VITALS
TEMPERATURE: 99 F | WEIGHT: 136 LBS | BODY MASS INDEX: 23.22 KG/M2 | HEIGHT: 64 IN | OXYGEN SATURATION: 98 % | SYSTOLIC BLOOD PRESSURE: 138 MMHG | HEART RATE: 91 BPM | DIASTOLIC BLOOD PRESSURE: 86 MMHG

## 2023-03-25 PROBLEM — E72.19 OTHER DISORDERS OF SULFUR-BEARING AMINO-ACID METABOLISM: Status: ACTIVE | Noted: 2023-03-25

## 2023-03-25 PROBLEM — I70.0 AORTIC ATHEROSCLEROSIS: Status: ACTIVE | Noted: 2023-03-25

## 2023-03-25 NOTE — PROGRESS NOTES
Subjective:       Patient ID: Mamta Bates is a 78 y.o. female.    Chief Complaint: Hypertension    HPI  She returns for management of hypertension.  She has had hypertension for over a year.  Current treatment has included medications outlined in medication list.  She denies chest pain or shortness of breath.  No palpitations.  Denies left arm or neck pain.       Past medical history:  Hypertension, fibromyalgia, dementia, status post hysterectomy     Medications:  Procardia 60 mg daily, Aricept 5 mg daily     No known drug allergies       Review of Systems   Constitutional:  Negative for chills, fatigue, fever and unexpected weight change.   Respiratory:  Negative for chest tightness and shortness of breath.    Cardiovascular:  Negative for chest pain and palpitations.   Gastrointestinal:  Negative for abdominal pain and blood in stool.   Neurological:  Negative for dizziness, syncope, numbness and headaches.     Objective:      Physical Exam  HENT:      Right Ear: External ear normal.      Left Ear: External ear normal.      Nose: Nose normal.      Mouth/Throat:      Mouth: Mucous membranes are moist.      Pharynx: Oropharynx is clear.   Eyes:      Pupils: Pupils are equal, round, and reactive to light.   Cardiovascular:      Rate and Rhythm: Normal rate and regular rhythm.      Heart sounds: No murmur heard.  Pulmonary:      Breath sounds: Normal breath sounds.   Abdominal:      General: There is no distension.      Palpations: There is no hepatomegaly or splenomegaly.      Tenderness: There is no abdominal tenderness.   Musculoskeletal:      Cervical back: Normal range of motion.   Lymphadenopathy:      Cervical: No cervical adenopathy.      Upper Body:      Right upper body: No axillary adenopathy.      Left upper body: No axillary adenopathy.   Neurological:      Cranial Nerves: No cranial nerve deficit.      Sensory: No sensory deficit.      Motor: Motor function is intact.      Deep Tendon Reflexes:  Reflexes are normal and symmetric.       Assessment/Plan       Assessment and plan:  1.  Hypertension:  Controlled.  Continue Procardia.  Check CMP  2.  Fibromyalgia: Follow-up with Rheumatology

## 2023-05-11 RX ORDER — DONEPEZIL HYDROCHLORIDE 10 MG/1
TABLET, FILM COATED ORAL
Qty: 90 TABLET | Refills: 3 | Status: SHIPPED | OUTPATIENT
Start: 2023-05-11 | End: 2023-06-01

## 2023-06-01 ENCOUNTER — OFFICE VISIT (OUTPATIENT)
Dept: NEUROLOGY | Facility: CLINIC | Age: 78
End: 2023-06-01
Payer: MEDICARE

## 2023-06-01 VITALS
HEIGHT: 64 IN | WEIGHT: 134.5 LBS | BODY MASS INDEX: 22.96 KG/M2 | SYSTOLIC BLOOD PRESSURE: 198 MMHG | HEART RATE: 73 BPM | DIASTOLIC BLOOD PRESSURE: 89 MMHG

## 2023-06-01 DIAGNOSIS — G31.09 FRONTOTEMPORAL LOBAR DEGENERATION: ICD-10-CM

## 2023-06-01 DIAGNOSIS — F02.80 FRONTOTEMPORAL LOBAR DEGENERATION: ICD-10-CM

## 2023-06-01 DIAGNOSIS — R25.2 CRAMPS OF LEFT LOWER EXTREMITY: ICD-10-CM

## 2023-06-01 DIAGNOSIS — F03.90 ADVANCING DEMENTIA: ICD-10-CM

## 2023-06-01 DIAGNOSIS — G31.09 OTHER MILD FRONTOTEMPORAL DEMENTIA WITH OTHER BEHAVIORAL DISTURBANCE: Primary | ICD-10-CM

## 2023-06-01 DIAGNOSIS — F02.A18 OTHER MILD FRONTOTEMPORAL DEMENTIA WITH OTHER BEHAVIORAL DISTURBANCE: Primary | ICD-10-CM

## 2023-06-01 DIAGNOSIS — G47.00 INSOMNIA, UNSPECIFIED TYPE: ICD-10-CM

## 2023-06-01 DIAGNOSIS — M79.7 FIBROMYALGIA: ICD-10-CM

## 2023-06-01 PROCEDURE — 96116 NUBHVL XM PHYS/QHP 1ST HR: CPT | Mod: S$PBB,59,, | Performed by: PSYCHIATRY & NEUROLOGY

## 2023-06-01 PROCEDURE — 99999 PR PBB SHADOW E&M-EST. PATIENT-LVL III: CPT | Mod: PBBFAC,,, | Performed by: PSYCHIATRY & NEUROLOGY

## 2023-06-01 PROCEDURE — 99999 PR PBB SHADOW E&M-EST. PATIENT-LVL III: ICD-10-PCS | Mod: PBBFAC,,, | Performed by: PSYCHIATRY & NEUROLOGY

## 2023-06-01 PROCEDURE — 96116 PR NEUROBEHAVIORAL STATUS EXAM BY PSYCH/PHYS: ICD-10-PCS | Mod: S$PBB,59,, | Performed by: PSYCHIATRY & NEUROLOGY

## 2023-06-01 PROCEDURE — 96116 NUBHVL XM PHYS/QHP 1ST HR: CPT | Mod: PBBFAC | Performed by: PSYCHIATRY & NEUROLOGY

## 2023-06-01 PROCEDURE — 99215 OFFICE O/P EST HI 40 MIN: CPT | Mod: S$PBB,,, | Performed by: PSYCHIATRY & NEUROLOGY

## 2023-06-01 PROCEDURE — 99417 PR PROLONGED SVC, OUTPT, W/WO DIRECT PT CONTACT,  EA ADDTL 15 MIN: ICD-10-PCS | Mod: S$PBB,,, | Performed by: PSYCHIATRY & NEUROLOGY

## 2023-06-01 PROCEDURE — 99215 PR OFFICE/OUTPT VISIT, EST, LEVL V, 40-54 MIN: ICD-10-PCS | Mod: S$PBB,,, | Performed by: PSYCHIATRY & NEUROLOGY

## 2023-06-01 PROCEDURE — 99417 PROLNG OP E/M EACH 15 MIN: CPT | Mod: S$PBB,,, | Performed by: PSYCHIATRY & NEUROLOGY

## 2023-06-01 PROCEDURE — 99213 OFFICE O/P EST LOW 20 MIN: CPT | Mod: PBBFAC,25 | Performed by: PSYCHIATRY & NEUROLOGY

## 2023-06-01 RX ORDER — SUVOREXANT 10 MG/1
1 TABLET, FILM COATED ORAL NIGHTLY
Qty: 10 TABLET | Refills: 2 | Status: SHIPPED | OUTPATIENT
Start: 2023-06-01 | End: 2023-06-08

## 2023-06-01 RX ORDER — GABAPENTIN 300 MG/1
300 CAPSULE ORAL 3 TIMES DAILY
Qty: 90 CAPSULE | Refills: 2 | Status: SHIPPED | OUTPATIENT
Start: 2023-06-01 | End: 2023-08-01

## 2023-06-01 RX ORDER — ACETAMINOPHEN, DIPHENHYDRAMINE HCL, PHENYLEPHRINE HCL 325; 25; 5 MG/1; MG/1; MG/1
TABLET ORAL
Qty: 30 TABLET | Refills: 3 | Status: SHIPPED | OUTPATIENT
Start: 2023-06-01 | End: 2023-07-11 | Stop reason: SDUPTHER

## 2023-06-01 RX ORDER — CLONAZEPAM 0.5 MG/1
0.5 TABLET, ORALLY DISINTEGRATING ORAL DAILY PRN
Qty: 10 TABLET | Refills: 0 | Status: SHIPPED | OUTPATIENT
Start: 2023-06-01 | End: 2023-07-11 | Stop reason: SDUPTHER

## 2023-06-01 RX ORDER — PYRIDOXINE HCL (VITAMIN B6) 100 MG
TABLET ORAL
Qty: 180 TABLET | Refills: 1 | Status: SHIPPED | OUTPATIENT
Start: 2023-06-01 | End: 2023-07-11

## 2023-06-01 RX ORDER — MELOXICAM 7.5 MG/1
7.5 TABLET ORAL DAILY PRN
Qty: 30 TABLET | Refills: 2 | Status: SHIPPED | OUTPATIENT
Start: 2023-06-01 | End: 2023-06-01 | Stop reason: SDUPTHER

## 2023-06-01 RX ORDER — MELOXICAM 7.5 MG/1
7.5 TABLET ORAL DAILY PRN
Qty: 30 TABLET | Refills: 2 | Status: SHIPPED | OUTPATIENT
Start: 2023-06-01

## 2023-06-01 NOTE — PROGRESS NOTES
[facility]     PATIENT: Mamta Bates  VISIT DATE: 2023  MRN: [id number]  PRIMARY PROVIDER: Ibeth Harris MD  : 1945       Chief complaint: Progressive Cognitive Impairment     History of present illness:      The patient is a 78-year-old right-handed female who presents today to the [facility] due to concerns related to Progressive Cognitive Impairment.  The patient is accompanied by the  who participates in providing history.  Additional information is obtained by reviewing available medical records.     Relevant Background/Context  Known Relevant Family history:  Mother  of 74 y/o CAD  Father  in NH old age (mid-75) unknown issues, possible NCD  Sister - DM2  Sister in wheelchair  60s 'fell off'  Neurocognitive Disorder:  The family denies a history of early/late onset cognitive impairment.  Movement Disorder:  The family denies a history of movement disorder (PD, PDD, tremor, etc).  Motorneuron Disorder:  The family denies a history of motor neuron disease (ALS).  Developmental Disorder:  The family denies a history of developmental learning disorder (Dyslexia, ADHD, ASD, etc.).  Psychiatric Disorder:  Sister - MDD related to LE dysfunction  Known Relevant Genetics:  There is no known relevant genetic testing available.  Developmental Milestones:  The patient/family report no known birth complications or early life problems. The patient met all developmental milestones.  Education/Learning Capacity:  The patient/family report no signs or symptoms suggestive of developmental learning disorder.  HS.  Associated degree in business - patient is unable to describe in meaningful detail due to aphasia  Estimated Educational Experience: 14 years of formal education.  Social History:  Patient lives with her  who is the primary caregiver. She is limited additional social support. She  "has no living relatives is the "last overlying ". And has no children. since FCI for the age 65 patient has not been engaged any significant meaningful social or community activities. Family reports that following FCI she was actively traveling with her  for. However this stopped early. She was diagnosed with fibromyalgia sometime within 5 years FCI and has reported minimal social or cognitive stimulation in the form of social with her cognitive exercises subsequently thereafter.  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 She reports being the  of [employer] for extended period of time and frequently references politicians and advocacy groups that she has been involved with. Patient retired at the age [age] for unclear reasons.  Retired/Quit: 0  Relevant Medical History:  HTN  Fibromyalgia     Neurocognitive Disorder Features  Onset/Duration:  Jul 2020 (~2-year)  First Symptom:  Memory impairment  Progression:  Gradually Progressive  Clinical Course:  Hospitalization (05/04/2022)  Type: Chart Review. The patient is a 77-year-old female past medical history fibromyalgia, hypertension, hysterectomy who presents with her  for concern for altered mental status including slurred speech, pain to lower extremities, seeing lights, and not making sense. Patient's her  reports that he believes that she has had some early signs of dementia at getting worse over the past few months however the patient does not agree to seek any medical care until today. He says it has been hard to convince her to seek any treatment. He states that sometimes she is hallucinating and sometimes she has garbled speech. He states today he left the house this morning at 9:00 a. M. To run errands. At that time she was not "normal" but that she was at her baseline. When he returned home about 2-1/2 hours ago he noted that she seemed to have slurred speech, " reporting seeing lights, complaining severe pain to her legs.  Electronic Medical Record (05/06/2022)  Type: Chart Review. Pt recently D/C'd from hospital for HTN encephalopathy. Concern from pt's her  for ongoing cognitive decline past couple of years but did not want to see a neurologist. Started on seroquel 12. 5mg QHS at D/C. CTH on file with mild volume loss per report.  Primary Care Provider (05/11/2022)  Type: Chart Review. Ms. the patient is a 77 year-old female with fibromyalgia and HTN who is presenting to clinic as a hospital follow-up appointment. She was recently hospitalized with encephalopathy secondary to a hypertensive urgency that resolved with re-initiation of her anti-hypertensives. She was discharged home on nifedipine and seroquel was started due to concerns of chronic changes in mentation. Since discharged home she has been doing well. Her  has been monitoring her blood pressure and has had to hold the nifedipine several times due to systolic blood pressure readings of 100. Per her , she has been having intermittent changes in mental status for the past months- he relayed this concern to the primary team while she was hospitalized and an appointment has been scheduled with neurology for further workup.  [facility] - Jaison Javier MD. Neurologist (07/20/2022)  Type: Chart Review. The patient presents with her , who is the primary historian. Additional history is gathered from a review of previous medical records. 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 The circumstances surrounding her early alf are unclear. The patient professes significant pride in her work and has not been very active since her alf. The family reported within a few years of her alf, the patient was diagnosed with fibromyalgia in the setting of chronic pain and cramps. The earliest reference to this  diagnosis in the medical chart was in 2015. It is unclear how this diagnosis was made, her specific symptoms, and whether there was an actual association in time with the patient's cognitive impairment. The patient denies any personal or her family history of autoimmune disorders. The onset of the patient's symptoms is unclear; however, her family thinks it happened during early COVID. The patient denies yandy COVID before or during the beginning of her symptoms. Her family denies any other overt changes in their daily behavior or medications. Over the last two years, her family reported in no specific order progressive incomprehensive speech, garbled talk with agitation, irritability, and apathy. The patient has always been active, social, and engaged; however, she has become more withdrawn and apathetic watching TV. She has become mildly paranoid and disinterested. The family reported an overall change in her personality; however, more so a magnification of baseline tendencies rather than a fundamental shift in temperament. Her family says she has been having increasing episodes of confusion and miscommunication between the patient and her family resulting in growing irritability and frustration, which compounds deficits and has resulted in agitation. During periods of frustration with agitation patient's cognitive deficits, including memory deficits, appear to worsen the patient's behavior programs more difficult to manage. Between 2020 and 2021, deficits gradually increased, becoming more possessive without over compulsions worsening memory and irritability. During this time frame, her family denies any overt signs of disinhibition, lack of sympathy or empathy, change in eating behavior, restlessness, movement disorders, or motor changes. From [id number] her family has requested a medical professional see her; however, she has refused this at every opportunity. The patient does have significant semantic  aphasia deficits. She can describe in unclear words that she does not feel intervention would change anything and that her deficits are due to age. In 2022, the patient's cognitive deficits cristina to medication noncompliance and interference with instrumental activities of daily living. The patient presented to the hospital in early 2022 with hypertensive encephalopathy, likely due to medication noncompliance. Following this hospitalization, staff recognized the patient's neurocognitive deficits, and she was referred to our neurocognitive Clinic for further evaluation. On presentation, the patient has diminished interpersonal responsiveness to social cues and is loud, and fast with somewhat incomprehensible speech, semantic aphasia, limited insight, and mild disinhibition. The observations made above, were discussed with the patient and her family. The family reports that minimum a 2 year history progressive multi domain cognitive impairment sufficient interfere with instrumental activities of daily living. Neurocognitive assessment shows language predominant multi major cognitive impairment. Neurological imaging is consistent with frontal temporal lobar degeneration. 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 Is unclear whether the patient is actively having hallucinations or whether not this is due to semantic aphasia. Recommend monitoring. Given patient's age likely mixed pathology. Recommend a trial of donepezil with follow-up in 1 month ongoing screening labs for reversible cause of cognitive impairment. Following this may consider increasing Seroquel for behavioral disinhibition and referral to speech therapy for additional care support.     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 The patient has significant oppositional care. The patient was subsequently lost to follow-up did not complete her workup. Serum laboratories were within normal limits. No evidence of elevated neurofilament light chain. No evidence of an autoimmune condition. Since last time seen patient's semantic dementia has gradually worsened. On presentation the patient is pleasant appropriate with fluent around chaotic speech with unclear meaning phrases. She is frankly perseverative mildly obsessive however pleasant. her family expresses concerns regarding progressive decline in asking if there any new therapies. We expressed that there is no disease modifying therapy for semantic dementia at this time. her family versus understanding. Recommend focusing on modifiable risk factors. The patient has a notable history of B12 deficiency recommend restarting B12. Regarding patient's bothersome fibromyalgia like symptoms with increased bilateral total lower extremities. We expressed concerns regarding possible opportunity related to cognitive dysfunction. The patient is currently having severe cramping once every 1-2 weeks. We discussed medication options prior trials. Recommend starting gabapentin vitamin B6 for lower extremity hyper excitability and p. R. N. Clonazepam for severe cramping that occurs every 1-2 weeks. We will refill meloxicam will recommend for the patient following up with primary rheumatologist. Recommend repeating B12 panel given history of deficiency without replenishment. The patient expresses understanding like to wait. May also start Belsomra 10 mg q. H. S. For insomnia. We answered all patient's and her family's questions.  Unresolved Concern(s) reported by patient/family:  Semantic aphasia  Opposition to care     Review of cognitive, visuospatial, motor, sensory, and behavioral systems:     Memory:   The patient's memory has worsened in the past few  years.  She does repeat statements or asks the same question repeatedly.  She does have difficulty remembering recent important conversations.  She does not have difficulty remembering recent events.  She does forget information within minutes.  Her recent retrograde memory is impaired.  Her remote memory is intact.  Attention:   The patient's attention and concentration are impaired.  She does not have attentional fluctuations.  She does have difficulty with selective attention.  She does become easily distracted.  She does have difficulty with divided attention.  Executive:   The patient's cognitive processing speed is slower.  She does have difficulty with working memory.  She does misplace personal items (e.g., keys, cell phone, wallet) more frequently.  She does have difficulty keeping track of her medications.  She does have difficulty with planning/organizing/completing multistep tasks.  She does have difficulty with executive attention.  She does not have difficulty with flexible thinking.  She does not difficulty with self control.  She is exhibiting new symptoms that suggest they have become more impulsive, rash and/or careless.  Her judgment is impaired.  Language:   The patient's speech is affected.  She does forget people's names more frequently.  She does have word-finding difficulties.  Her speech is fluent and non-effortful.  Her speech is not grammatically intact.  She does not make word substitutions.  She does not have difficulty reading.  She appears to have impaired comprehension.  Visuospatial:   The patient has new visuospatial problems.  She has become confused or disoriented in *new*, unfamiliar places.  She does not have trouble with navigation.  She does not get lost in familiar places.  She does not have visuospatial disorientation.  She does not have difficulty recognizing objects or faces.  Motor/Coordination:   The patient does have difficulty with walking.  She does feel  imbalanced.  She denies having fallen.  She does not appear to have new muscle weakness.  She does have difficulty buttoning shirts, operating zippers, or manipulating tools/utensils.  Her handwriting has not become micrographic.  She does have a resting tremor.  She denies having any new involuntary movements and/or muscle jerking.  She does not have swallowing difficulty.  She denies new muscle cramps and twitching.  Sensory:   The patient denies new numbness, tingling, paresthesias, or pain.  The patient denies a loss of vision, blurry vision, or double vision.  The patient denies new loss of hearing or worsening tinnitus.  The patient denies anosmia.  Sleep:   The patient reports difficulty sleeping.  The patient does not snore or have witnessed apneas while sleeping.  When she wakes up in the morning, she does feel well-rested.  She denies dream-enactment behavior.  She denies symptoms suggestive of restless leg syndrome.  Behavior:   The patient's personality has changed. Comment: exaggeration of baseline  She does have symptoms of disinhibition and social inappropriateness. Comment: exaggeration of baseline  She is exhibiting symptoms to suggest a loss of manners and/or decorum. Comment: exaggeration of baseline  She does not appear apathetic or has decreased motivation.  She does not appear to have a change in inertia.  The patient's emotional expression has changed. Comment: exaggeration of baseline  She does have emotional blunting or lability. Comment: exaggeration of baseline  She does have symptoms of irritability and mood lability.  She does not have symptoms of agitation, aggression, or violent outbursts.  Her insight into his disease and situation is impaired.  Her personal hygiene is intact.  She is not exhibiting a diminished response to other people's needs and feelings.  She is exhibiting diminished social interest, interrelatedness, and/or personal warmth.  She does appear restless.  She denies  new and/or worsening simple repetitive behaviors.  Her speech has become simplified and/or repetitive/stereotyped.  She reports symptoms that are suggestive of new/worsening complex repetitive/ritualistic compulsions and behaviors.  She does not have symptoms of hyper-religiosity or dogmatism.  Her interests/pleasures have not become restrictive, simplified, interrupting, or repetitive.  She denies a change of self-stimulating behavior.  She denies any changes in eating behavior.  She denies increased consumption of food or substances.  She denies oral exploration or consumption of inedible objects.  Psychiatric:   She does not feel depressed.  She is exhibiting symptoms of social withdrawal/indifference.  She denies anxiety.  She does not exhibit cycling behavior.  She does exhibit hyperactive behavior.  She is exhibited symptoms of paranoia.  She does not have delusions.  She does not have hallucinations. Comment: possible? Unclear if language impairment and not able to vocalize her thoughts accurately or actual hallucination  She does not have a history of sensitivity to neuroleptic/psychotropic medications.  Medical Review of Systems:   The patient does not have constipation.  The patient does not have urinary incontinence.  The patient denies orthostatic lightheadedness.  The patient's weight is stable.  Functional status:  Difficulty performing the following Instrumental ADLs:  Household chores: Yes  Food Preparation: No  Shopping: Yes  Ability to Handle Finances: Yes  Transportation/Driving: Yes  Household Appliances/Stove: Yes  Laundry: Yes  Difficulty performing the following Basic ADLs:  Dressing: No  Bathing: No  Toileting: No  Personal hygiene and grooming: No  Feeding: No  Care Management:  Patient/Family Safety Concerns:  Medication Adherence: No  Home Safety: No  Wandered: No  Firearms: No  Fall Risk: No  Home Alone: No          Past Medical History:   Diagnosis Date    Fibromyalgia     Hypertension         Past Surgical History:   Procedure Laterality Date    HYSTERECTOMY         Family History   Problem Relation Age of Onset    Heart disease Mother        Social History     Socioeconomic History    Marital status:    Tobacco Use    Smoking status: Never    Smokeless tobacco: Never   Substance and Sexual Activity    Alcohol use: No    Drug use: No    Sexual activity: Yes     Partners: Male       Medication:     Current Outpatient Medications on File Prior to Visit   Medication Sig Dispense Refill    ezetimibe (ZETIA) 10 mg tablet Take 10 mg by mouth once daily.      guaiFENesin 100 mg/5 ml (ROBITUSSIN) 100 mg/5 mL syrup Take 200 mg by mouth 3 (three) times daily as needed for Cough.      magnesium oxide (MAG-OX) 400 mg (241.3 mg magnesium) tablet Take 400 mg by mouth once daily.      NIFEdipine (PROCARDIA-XL) 60 MG (OSM) 24 hr tablet Take 1 tablet (60 mg total) by mouth once daily. 90 tablet 1    QUEtiapine (SEROQUEL) 25 MG Tab Take 1/2 tablet by mouth every evening 90 tablet 1    tiZANidine (ZANAFLEX) 2 MG tablet Take 0.5-1 tablets (1-2 mg total) by mouth every 8 (eight) hours as needed (back pain). 90 tablet 2    [DISCONTINUED] donepeziL (ARICEPT) 10 MG tablet TAKE 1 TABLET(10 MG) BY MOUTH EVERY DAY 90 tablet 3    [DISCONTINUED] meloxicam (MOBIC) 7.5 MG tablet Take 1 tablet (7.5 mg total) by mouth daily as needed for Pain. 30 tablet 2     No current facility-administered medications on file prior to visit.        Review of patient's allergies indicates:  No Known Allergies    Medications Reconciliation:   I have reconciled the patient's home medications and discharge medications with the patient/family. I have updated all changes.  Refer to After-Visit Medication List.    Objective:  Vital Signs:  Vitals:    06/01/23 0857   BP: (!) 198/89   Pulse: 73     Wt Readings from Last 3 Encounters:   06/01/23 0857 61 kg (134 lb 7.7 oz)   03/22/23 1036 61.7 kg (136 lb 0.4 oz)   09/20/22 1402 61.2 kg (134 lb 14.7  "oz)     Body mass index is 23.08 kg/m².           Neurological examination:  Mental Status:   Her appearance was abnormal.  Throughout the interview, she behaved abnormally and was not cooperative.  The patient's energy level is abnormal.  Her orientation is not entirely accurate.  Her attention/concentration is impaired.  She can complete three-step commands.  Her thought process is not logical or goal-oriented.  She demonstrated impaired insight based on actions, awareness of her illness, plans for the future.  She demonstrated impaired judgment based on actions and plans for the future.  She has no evidence of hallucinations (auditory, visual, olfactory).  She has no evidence of delusions (paranoid, grandiose, bizarre).  Cranial Nerves:   Her pupils were normal.  Her visual fields were full to confrontation in all quadrants.  Her ocular pursuit in the horizontal and vertical plane was complete.  Her saccadic initiation, velocity, and amplitude are normal.  Her facial strength was normal.  Her facial expression was symmetric and appropriate to the context.  Her facial sensation was intact to light touch bilaterally.  Her hearing was normal bilaterally.  Her tongue showed no evidence of scalloping.  She tongue movement with normal.  Speech/Language:   The patient's speech was not completely fluent and non-effortful.  Her speech timbre is abnormal.   Comment: loud; loud  Her speech rate is abnormal.   Comment: fast; fast  She has no articulation (segmental features) errors.  She has no speech dysdiadochokinesia with repetition of syllables such as "/PA/, /TA/, /KA/, /OM/".  She made no errors during the repetition of rapid syllables and or words such as "caterpillar" "", and "huckleberry"  She has no repetition errors of rapid sequences of consonants, such as in "[employer]" or "[employer]".  She has no prosody (suprasegmental features) errors.  Her stress assessment showed no " "repetition errors in linguistically complex words, including multisyllabic words ("planetarium," "questionable," "accomplishment," "phonetic.  The patient's speech is not dysarthric.  She does not have hyperkinetic dysarthria.  The patient showed evidence of anomia.  She showed evidence of anomia during spontaneous speech.  She showed evidence of anomia during confrontational naming.  She makes no phonological loop errors.  She makes no errors during the repetition of gibberish words (e.g., "Supercalifragilisticexpialidocious," "Pigglywiggly," "[place]osp[place]oo," "Zowzy," "Bazinga").  She makes no errors during the repetition of complex meaningless phrases (e.g., "The horse raced past the barn fell.", "The complex houses  and single soldiers and their families," "Wishes are hopping, and trees are west," and "Brushing liked to karen madison's direction").  She can comprehend commands that cross the midline (e.g., with your left thumb, touch your right ear).  She has difficulty comprehending commands that depend on syntax.  Her speech is not grammatically intact.  She makes superordinate errors when asked to draw an animal.  Motor:   The patient's bilateral upper extremity muscle bulk is appropriate.  The patient's upper extremity muscle tone is increased.   Comment: Unable to relax, b/l paratonia R>L cant assess for rigidity ; Unable to relax, b/l paratonia R>L cant assess for rigidity  The patient's bilateral upper extremity muscle tone does not suggest spasticity.  There is evidence of paratonia.   Comment: Muscle tone is increased and there is evidence of paratonia.; Muscle tone is increased and there is evidence of paratonia.  Assessment of motor strength was symmetric and at minimal anti-gravity.  There is no pronator or downward drift.  There is no myoclonus observed in The patient's bilateral upper and lower extremities.  There are no fasciculations observed in The patient's bilateral upper and lower " extremities.  Coordination:   She has no bilateral upper extremity limb dysmetria or past pointing on finger-nose-finger bilaterally.  She has no limb dysdiadochokinesia of the upper extremity on the pronation/supination test and screwing in a light bulb or lower extremity during tapping ball of each foot bilaterally.  She has a visible tremor.  She has no kinetic tremor bilaterally.  She has a postural tremor.  She has no resting tremor bilaterally.  She has evidence of interhemispheric motor control deficits.  She demonstrates evidence of motor overflow.  She has no dyskinetic movements.  The patient's upper extremity fine motor coordination was abnormal.   Comment: mild R>L; mild R>L  The patient's bilateral upper extremity coordination with finger tapping, pronation/supination, and the open-close fist showed slowing.  The patient's bilateral upper extremity coordination with finger tapping, pronation/supination, and the open-close fist showed hypometria.  The patient's bilateral upper extremity coordination with finger tapping, pronation/supination, and the open-close fist showed dysrhythmia.  Higher Cortical Function:   The patient showed no evidence of simultanagnosia (Navon hierarchical letters).  She demonstrates no evidence of dorsal simultanagnosia (overlapping objects).  She demonstrates no evidence of ventral simultanagnosia (complex picture synthesis).  The patient showed evidence of visuospatial constructional dysfunction.  The patient showed evidence of angular gyrus disconnection (insular-operculum).  She has evidence of dysgraphia.  The patient showed evidence of apraxia.  She showed no evidence of ideomotor apraxia performing tool-use pantomimes (e.g., use a hammer, use a screwdriver, use a comb, flip a coin, waving goodbye).  She showed no evidence of ideational apraxia (e.g., taking off and putting on shoes, folding paper into an envelope).  She showed evidence of limb-motor apraxia during  mimicking complex bimanual hand shapes.  She showed no evidence of buccofacial apraxia (e.g., blow out a candle, puff out cheeks, and whistle).  She showed dysexecutive behavior.  She showed no utilization or imitation behavior.  She has evidence of perseverative or stereotyped behavior.  She demonstrated stimulus-bound behavior.  Sensory:   Her cortical sensory assessment demonstrated no neglect bilaterally.  She he had no astereognosis (paper clip, ring, dime) bilaterally in the palms.  She he had no agraphesthesia (drawing numbers) in the palms.  Reflexes:   Reflexes were symmetric and 2+ at biceps, 2+ triceps, and 2+ brachioradialis, 2+ at the knees bilaterally, there was no cross-abductor sign, 2+ in the bilateral ankles.  Gait:   She can arise from a chair and sit back down without using their arms.  Her gait was normal.  When attempting to walk abnormally (heels, tiptoes, tandem), she makes no errors.  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  Severe Language Impairment: She scored >3 standard deviations below the norm on at least one measure. She had difficulty with naming, abstract, semantic association, Semantics.  Moderate Visuospatial Impairment: visuospatial construction, simultanagnosia.  Moderate Executive Impairment: She scored >1.5 standard deviations below the norm on at least one measure. She had difficulty with working memory, lexical fluency.  Moderate Attention Impairment: orientation.  MMSE 17/30: MMSE Score suggestive of moderate cognitive impairment.  MOCA 10/30: MOCA Score suggestive of moderate cognitive impairment.  BEHAV5+ 4/6: See ROS section for a full description  Neurocognitive/Neurobehavioral Evaluation completed on 2023-06-01    Neuropsychiatric/Behavioral Focused Evaluation Assessment     BEHAV5+ 4/6 See ROS section for a full description   Laboratories:     Lab Date Value [Reference]   Autoimmune/Paraneoplastic Screening           NAILA 2022, Jul-20    Negative       CRP 2022, Jul-20    2.5 [0.0 - 8.2 mg/L]      Haptoglobin 2022, Jul-20    159 [30 - 250 mg/dL]      Sed Rate 2022, Jul-20    11 [0 - 36 mm/Hr]      Metabolic Screening   Ferritin 2022, Jul-20    46 [20.0 - 300.0 ng/mL]      Hemoglobin A1C External 07/20/2022  4.9 [4.0 - 5.6 %]      Methlymalonic Acid 07/20/2022  0.21      T4 Total 07/20/2022  7.6 [4.5 - 11.5 ug/dL]      TSH 05/04/2022  1.946 [0.400 - 4.000 uIU/mL]      Glucose 2023, Mar-22    76 [70 - 110 mg/dL]      Iron 2022, Jul-20    55 [30 - 160 ug/dL]      Saturated Iron 2022, Jul-20    14 (L) [20 - 50 %]      TIBC 2022, Jul-20    404 [250 - 450 ug/dL]      Transferrin 2022, Jul-20    273 [200 - 375 mg/dL]      Albumin 2023, Mar-22    4.2 [3.5 - 5.2 g/dL]      Alkaline Phosphatase 2023, Mar-22    97 [55 - 135 U/L]      ALT 2023, Mar-22    15 [10 - 44 U/L]      AST 2023, Mar-22    26 [10 - 40 U/L]      BILIRUBIN TOTAL 2023, Mar-22    0.6 [0.1 - 1.0 mg/dL]      PROTEIN TOTAL 2023, Mar-22    7.9 [6.0 - 8.4 g/dL]      Cholesterol 2022, May-05    189 [120 - 199 mg/dL]      HDL 2022, May-05    52 [40 - 75 mg/dL]      Non-HDL Cholesterol 2022, May-05    137 [mg/dL]      Triglycerides 07/20/2022  75 [30 - 150 mg/dL]      B12 Def. Methylmalonic Acid 07/20/2022  0.20      Folate 05/11/2022  4.5 [4.0 - 24.0 ng/mL]      Thiamine 07/20/2022  44 [38 - 122 ug/L]      Vitamin B-12 05/11/2022  307 [180 - 914 ng/L]      Vitamin E 2022, Jul-20    1610 [500 - 1800 ug/dL]      Cerebrospinal Fluid Assessment   IgG 07/20/2022  1189 [650 - 1600 mg/dL]      Neuroendocrine/Electrolyte Screening   Magnesium 05/04/2022  2.1 [1.6 - 2.6 mg/dL]      BUN 2023, Mar-22    15 [8 - 23 mg/dL]      Chloride 2023, Mar-22    110 [95 - 110 mmol/L]      Creatinine 2023, Mar-22    0.9 [0.5 - 1.4 mg/dL]      Potassium 2023,  Mar-22    3.5 [3.5 - 5.1 mmol/L]      Sodium 2023, Mar-22    145 [136 - 145 mmol/L]      Infectious Disease/Immunocompromised Screening   SARS-CoV-2 RNA, Amplification, Qual 2022, May-04    Negative      Hepatitis C Ab 07/20/2022  Negative      HIV 1/2 Ag/Ab 07/20/2022  Negative      Syphilis Treponemal Ab 2022, Jul-20    Nonreactive [Nonreactive]      Standard Hematology Screen   Hematocrit 2022, Jul-20    38.3 [37.0 - 48.5 %]      Hemoglobin 2022, Jul-20    11.9 (L) [12.0 - 16.0 g/dL]      MCV 2022, Jul-20    92 [82 - 98 fL]      Platelets 2022, Jul-20    285 [150 - 450 K/uL]      Toxin/Heavy Metal Screening   Amphetamine Screen, Ur 2022, May-04    Negative      Barbiturate Screen, Ur 2022, May-04    Negative      Benzodiazepines 2022, May-04    Negative      Cocaine (Metab.) 2022, May-04    Negative      Marijuana (THC) Metabolite 2022, May-04    Negative      Methadone metabolites 2022, May-04    Negative      Opiate Scrn, Ur 2022, May-04    Negative      Phencyclidine 2022, May-04    Negative      Delirium Screening   Glucose, UA 2022, May-04    Negative      Ketones, UA 2022, May-04    Negative      Leukocytes, UA 2022, May-04    1+ (A)      NITRITE UA 2022, May-04    Negative      Protein, UA 2022, May-04    Negative      RBC, UA 2022, May-04    2 [0 - 4 /hpf]      WBC, UA 2022, May-04    1 [0 - 5 /hpf]           Neuroimaging:    MRI brain/head without contrast on 5/4/2022  Formal interpretation by Radiology:  Please note image quality is mildly degraded by patient motion artifact, most notably postcontrast images. There is no abnormal restricted diffusion to suggest acute infarction. There is generalized cerebral volume loss with compensatory prominence of the extra-axial spaces, cerebral sulci and ventricular system. The ventricles demonstrate no evidence of hydrocephalus or midline shift. Minimal periventricular T2/FLAIR hyperintensity which is nonspecific but likely reflect sequela of chronic  microvascular ischemic change. There are no abnormal areas of gradient susceptibility to suggest parenchymal hemorrhage. No extra-axial hemorrhage or fluid collections. Following the administration of IV contrast, allowing for motion artifact, there are no definite pathologic foci of enhancement. The craniocervical junction and sellar regions are within normal limits.  Independently reviewed radiological imaging by Jaison Jackson MD. MPH. Behavioral Neurologist  T1: Mild-to-moderate frontal temporal lobar degeneration with regional atrophy most well appreciated in the anterior cortices dorsal greater than ventral with thinning of the anterior cingulate sulci, atrophy of the medial prefrontal dorsal lateral prefrontal relatively mild atrophy of the orbital frontal cortex. Most atrophy appears to be the dorsal midline greater than lateral prefrontal cortex. In addition there is right greater than left anterior temporal atrophy with relative sparing of hippocampi however still mild bilateral hippocampal it atrophy. No significant posterior cortical atrophy. Generalized thinning of the anterior cingulate with sparing of the mid brain and pontine region.  T2/FLAIR: No Significant hyperintensities appreciated on MRI T2/FLAIR  DWI/ADC: No Significant DWI hyperintensities/hypointensities. No ADC correlation.  SWI/GRE: No Significant hypointensities to suggest cortical/subcortical hemosiderin deposition.  Impression: : Bilateral prefrontal cortical atrophy and right greater than left anterior temporal atrophy consistent with frontal temporal lobe degeneration. Sparing of the brainstem. No significant subcortical white matter disease. No significant posterior cortical atrophy.     Procedures:    Electrocardiogram on 5/4/2022  Formal interpretation:  Vent. Rate : 071 BPM     Atrial Rate : 071 BPM    P-R Int : 166 ms          QRS Dur : 068 ms     QT Int : 384 ms       P-R-T Axes : 068 011 045 degrees    QTc Int : 417 ms  Normal sinus rhythm Low voltage QRS Abnormal ECG  Independently reviewed Electrocardiogram by Jaison Jackson MD. MPH. Behavioral Neurologist  Impression: : Received ECG has no evidence of sinus node disease. HR (>=50-60). Prolonged GA interval (>0.22 s). Broad QRS complex (> 0.12 s).     Clinical Summary:     The patient is a 78-year-old right-handed female with a relevant past medical history of HTN with encephalopathy, fibromyalgia, who presents reporting a 2-year history of gradually progressive neurocognitive impairment.       The clinical history is suggestive of:  Memory Impairment: STM encoding impairment, LTM encoding-retrieval impairment, Amnesia of fixation  Attention Impairment: Attention, Selective attention, Sustained attention, Shifting attention  Executive Impairment: Energization, Working Memory, Set-Shifting, Response Inhibition  Language Impairment: Language Dysfunction, Grammar Dysfunction, Receptive Dysfunction  Visuospatial Impairment: Allocentric Spatial Processing  Motor/Coordination Impairment: Sensory motor integration, Central pattern generators dysfunction  Behavior Impairment: Emotional Regulation, Self-Preservation Dysregulation, Social Coherence, Sensorimotor Dysregulation  Psychiatric Impairment: Stimulation Dysregulation, Paranoia  iADL Impairment: Scott Instrumental Activities of Daily Living Scale  The neurological examination is significant for:  Cortical Frontal Dysfunction: non-fluent aphasia (fluency), agrammatic aphasia (syntax comprehension, substitutions)  Cortical Frontal-Parietal Disconnection: apraxia (limb-motor)  Cortical Temporal Dysfunction: anomic aphasia (spontaneous, confrontational), semantic aphasia (superordinate errors)  Cortical Temporal-Parietal Dysfunction: dysgraphia  Cortical Transcallosal Disconnection: interhemispheric motor control (interhemispheric motor control ), motor efference (motor overflow)  Executive Impairment: thought disorder, judgment,  dysexecutive behavior (perseverative/stereotyped, stimulus-bound)  Movement Disorder (Hyperkinetic): tremor (postural)  Movement Disorder (Hypokinetic): paratonia (tone), parkinsonism (bradykinesia)  Movement Disorder (Speech): abnormal vocal features (volume, rate)  The neurocognitive battery is significant (based on age and education) for:  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 Sparing of the brainstem. No significant subcortical white matter disease. No significant posterior cortical atrophy.        Assessment:        The patient's clinical presentation is behavior/psychiatric predominant major cognitive impairment (mild dementia) sufficient to impair activities of daily living (CDR-SOB: 5.5 , Aiken-Hosea iADL: 7/8 - Mild Dementia).     The patient's clinical presentation meets the criteria for Dementia (McKhann, et al. 2011 Alzheimer's & Dementia).     Concern regarding an intraindividual change in cognition diagnosed through a combination of history and objective cognitive assessment  Impairment in two or more cognitive domains  Interference with independence in functional abilities.  Represents a decline from previous levels of functioning.  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  Early perseverative, stereotyped, or compulsive/ritualistic behavior: simple repetitive movements, complex, compulsive or ritualistic  behaviors, stereotypy of speech.  Frontal and/or anterior temporal atrophy on MRI or CT  Impaired confrontation naming.  Impaired single-word comprehension.  Impaired object knowledge, particularly for low-frequency or low-familiarity items.  Surface dyslexia or dysgraphia.  Spared repetition.  Spared speech production (grammar and motor speech).  Predominant anterior temporal lobe atrophy.     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 There is however reported symptoms of possible hallucinations. Patients  has never seen her respond to sounds/sights that were no there as such it is unclear whether 'hallucinations' are actually an inability to articulate herself or actual sensory hallucinations.   At present, all neurodegenerative diseases can only be diagnosed with 100% certainty through a brain autopsy. The suspected neuropathology underlying the patient's neurocognitive impairment is likely a mixture of pathologies (Alzheimer's Disease Related Pathology, TAR DNA-binding protein 43).  There are no plasma protein biomarkers available on record.  There are no CSF protein biomarkers available on record.  There are no dermatological protein biomarkers available on record.  There is no known relevant genetic testing available.     The observations made above, were discussed with the patient and their supporting historian(s) (). We have discussed the additional diagnostic(s) and/or managenent below.     Prior Authorization Statement(s) Suvorexant (as well as other orexin inhibitors) is extremely effective, well-tolerated in those over 65 with minimal adverse reactions in clinical trials, and non-addictive. In clinical trials it was shown to improve total sleep time, sleep latency, waking after sleep onset, and quality of sleep, and the only adverse reaction that clearly   from placebo was morning grogginess (Suvorexant 7%, placebo 3%). If insurance requires a prior authorization, guidelines established by the American Academy of Sleep Medicine (AASM) should be sufficient justification for using Suvorexant. Per the AASM guidelines (Crystal et al, J Clin Sleep Med, 2017;13(2):307-349), Suvorexant is the most reasonable choice for the treatment of the insomnia in those over 65, particularly in the setting of cognitive impairment. Benzodiazepines (e.g., Triazolam, Temazepam), Z drugs (e.g., Zolpidem, Eszopiclone), and anticholinergic medications (e.g., Doxepin) are not recommended to treat insomnia in those over 65 and are listed on the AGS Beers Criteria (AGS Beers Criteria Update Expert Panel, J Am Geriatr Soc, 2019;67(4):674-694). These medications increase the risk for falls and worsen cognition. Other medications, including Zaleplon and Ramelteon are only indicated for sleep-onset insomnia. Trazodone is not recommended by AASM for the treatment of insomnia.     Care Management Plan:    #Diagnostic Screening for known genetic causes of neurodegeneration.  We have discussed opportunities for genetic testing (eÃ‡iftitae, [facility]).  #Optimize Neurocognitive Impairment and Quality  We have discussed the MIND Diet and other lifestyle behavior that may help maintain brain health.  We have provided written/digital reading material  Recommend D/C donepezil  Start b12 5000 mcg weekly  #Optimize Behavioral Management and Quality.  No indication for memantine at this time  Start gabapentin 300 mg TID  #Optimize Cerebrovascular Health.  The patient has a documented history of hyperlipidemia and/or hypercholesteremia with long-term complications such as cerebrovascular disease, peripheral vascular disease, and/or aortic atherosclerosis. Collectively these risk factors may contribute to cerebral atherosclerosis, and cerebral hypoperfusion compounded neurocognitive disorder. We discussed  maximizing cerebrovascular-related medical therapy, including but not limited to cholesterol medications and antiplatelet agents. We have discussed the value of aggressively controlling vascular risk factors like hypertension, hyperlipidemia, and Diabetes SBP<130, LDL<100, and A1C<7.0. We discussed the need to optimize lifestyle choices, including a heart-healthy diet (e.g., Mediterranean or DASH), increased cardiovascular exercise (goal 150 minutes of moderate-intensity per week), and staying cognitively and socially active.  #Optimize Fibromyalgia Disorder and Quality.  Start gabapentin 300 mg TID  Start B6 taper - 600 mg daily for 1 month, 400 mg daily for 1 month, then 200 mg daily  Refill meloxicam  f/u rheumatology  Start clonazepam 0.5 mg PRN for severe cramps  #Behavioral/Environmental Strategies  We recommend engaging in activities that stimulate cognitively and socially while avoiding excessive stimulation and fatigue in overwhelmingly complex situations.  We recommend integrating routine and schedule into your daily life. https://www.alzheimersproject.org/news/the-importance-of-routine-and-familiarity-to-persons-with-dementia/  #Health Maintenance/Lifestyle Advice  We have discussed the value in aggressively controlling vascular risk factors like hypertension, hyperlipidemia, and Diabetes SBP<130, LDL<100, A1C<7.0.  We discussed the need to optimize lifestyle choices including a heart-healthy diet (e.g., Mediterranean or DASH), increased cardiovascular exercise (goal 150 minutes of moderate-intensity per week), and stay cognitively and socially active.  #Support  We all need support sometimes. Get easy access to local resources, community programs, and services. https://www.communityresourcefinder.org/  Learn more about Cognitive Impairment in [place]: https://www.alz.org/professionals/public-health/state-overview/[place]  #Safety  [place] has no laws against driving with dementia specifically but  "obviously has laws about medical conditions which impact a person's ability to drive safely. If you believe your loved one's driving capacity has diminished, please reach out to either your primary care physician or our office to discuss driving restrictions or revocation of their license. To learn more: https://www.dementiacarecentral.com/caregiverinfo/driving-problems/  The Alzheimer's Association administers the nationwide "Safe Return" program with identification bracelets, necklaces, or clothing tags and 24-hour assistance. More information is available online at https://www.alz.org/help-support/caregiving/safety/medicalert-with-24-7-wandering-support  #Follow up:  Follow-up as needed.    Thank you for allowing us to participate in the care of your patient. Please do not hesitate to contact us with any questions or concerns.     It was a pleasure seeing The patient and we look forward to seeing them at their follow-up visit.     This note is dictated on M*Modal Fluency Direct word recognition program. There are word recognition mistakes that are occasionally missed on review.         Scheduled Follow-up :  Future Appointments   Date Time Provider Department Center   6/21/2023  4:00 PM Birgit Crane MD [facility] RMTLGY [place]   6/29/2023  1:00 PM Jaison Javier MD [facility] [id number] [place] [address]   7/24/2023  9:30 AM Duarte Perez, OD [facility] OPTO [place]       After Visit Medication List :     Medication List            Accurate as of June 1, 2023  9:45 AM. If you have any questions, ask your nurse or doctor.                START taking these medications      BELSOMRA 10 mg Tab  Generic drug: suvorexant  Take 1 tablet by mouth every evening.  Started by: Jaison Javier MD     pyridoxine (vitamin B6) 100 MG Tab  Commonly known as: B-6  Take 3 tablets twice a day for 1 month, then 2 tablets twice a day for 1 month, then continue 1 tablet twice a day  Started by: Jaison Javier MD            CONTINUE taking these medications "      ezetimibe 10 mg tablet  Commonly known as: ZETIA     guaiFENesin 100 mg/5 ml 100 mg/5 mL syrup  Commonly known as: ROBITUSSIN     magnesium oxide 400 mg (241.3 mg magnesium) tablet  Commonly known as: MAG-OX     meloxicam 7.5 MG tablet  Commonly known as: MOBIC  Take 1 tablet (7.5 mg total) by mouth daily as needed for Pain.     NIFEdipine 60 MG (OSM) 24 hr tablet  Commonly known as: PROCARDIA-XL  Take 1 tablet (60 mg total) by mouth once daily.     QUEtiapine 25 MG Tab  Commonly known as: SEROQUEL  Take 1/2 tablet by mouth every evening     tiZANidine 2 MG tablet  Commonly known as: ZANAFLEX  Take 0.5-1 tablets (1-2 mg total) by mouth every 8 (eight) hours as needed (back pain).            STOP taking these medications      donepeziL 10 MG tablet  Commonly known as: ARICEPT  Stopped by: Jaison Javier MD               Where to Get Your Medications        These medications were sent to [id number] [address] [address]      Phone: [phone]   BELSOMRA 10 mg Tab  meloxicam 7.5 MG tablet  pyridoxine (vitamin B6) 100 MG Tab         Signing Physician:  Jaison Javier MD    Billing:        -----------------------------------------------------------------------------    I spent a total of 75 minutes (time-in: 08:30 AM; time-out: 09:45 AM) on 2023-06-01, in person face-to-face with the patient and caregiver(s), >50% of that time was spent counseling regarding the symptoms, treatment plan, risks, therapeutic options, lifestyle modifications, and/or safety issues for the diagnoses above.    10/14 Review of Systems completed and is negative except as stated above in HPI (Systems reviewed: Const, Eyes, ENT, Resp, CV, GI, , MSK, Skin, Neuro)    I reviewed previous labs for a total of 5 minutes on 2023-06-01. This is directly related to the face-to-face encounter. Review of previous labs was performed  all negative except as stated above in HPI    I reviewed the summation of records from outside physicians for a total of 5 minutes on 2023-06-01. Reviewed and summation of records from an outside physician was performed as summarized above in HPI    I performed a neurobehavioral status examination that included a clinical assessment of thinking, reasoning, and judgment. Please see above HPI and ROS for full details. This exam was performed on 2023-06-01 and included 11 minutes spent on direct face-to-face clinical observation and interview with the patient and 22 minutes spent interpreting test results and preparing the report. The total time of 33 minutes spent on the neurobehavioral status examination is not included in the time spent on evaluation and management coding.    Total Billing time spent on encounter/documentation for this patient's evaluation and management, not including the neurobehavioral status examination: 74 minutes.

## 2023-06-08 ENCOUNTER — TELEPHONE (OUTPATIENT)
Dept: NEUROLOGY | Facility: CLINIC | Age: 78
End: 2023-06-08
Payer: MEDICARE

## 2023-06-08 ENCOUNTER — PATIENT MESSAGE (OUTPATIENT)
Dept: NEUROLOGY | Facility: CLINIC | Age: 78
End: 2023-06-08
Payer: MEDICARE

## 2023-06-08 DIAGNOSIS — G47.00 INSOMNIA, UNSPECIFIED TYPE: Primary | ICD-10-CM

## 2023-06-08 RX ORDER — ZOLPIDEM TARTRATE 5 MG/1
5 TABLET ORAL NIGHTLY
Qty: 30 TABLET | Refills: 1 | Status: SHIPPED | OUTPATIENT
Start: 2023-06-08 | End: 2023-07-11

## 2023-06-19 ENCOUNTER — TELEPHONE (OUTPATIENT)
Dept: RHEUMATOLOGY | Facility: CLINIC | Age: 78
End: 2023-06-19
Payer: MEDICARE

## 2023-06-19 NOTE — TELEPHONE ENCOUNTER
Left voicemail asking patient to call the office back regarding appointment scheduled on June 21. Provider changed location to [facility] [place].

## 2023-06-21 ENCOUNTER — OFFICE VISIT (OUTPATIENT)
Dept: RHEUMATOLOGY | Facility: CLINIC | Age: 78
End: 2023-06-21
Payer: MEDICARE

## 2023-06-21 VITALS
DIASTOLIC BLOOD PRESSURE: 85 MMHG | HEART RATE: 102 BPM | BODY MASS INDEX: 24.13 KG/M2 | SYSTOLIC BLOOD PRESSURE: 153 MMHG | HEIGHT: 64 IN | WEIGHT: 141.31 LBS

## 2023-06-21 DIAGNOSIS — M54.16 LUMBAR RADICULOPATHY: Primary | ICD-10-CM

## 2023-06-21 DIAGNOSIS — M79.7 FIBROMYALGIA: ICD-10-CM

## 2023-06-21 PROCEDURE — 99999 PR PBB SHADOW E&M-EST. PATIENT-LVL V: CPT | Mod: PBBFAC,,, | Performed by: INTERNAL MEDICINE

## 2023-06-21 PROCEDURE — 99215 PR OFFICE/OUTPT VISIT, EST, LEVL V, 40-54 MIN: ICD-10-PCS | Mod: S$PBB,,, | Performed by: INTERNAL MEDICINE

## 2023-06-21 PROCEDURE — 99215 OFFICE O/P EST HI 40 MIN: CPT | Mod: S$PBB,,, | Performed by: INTERNAL MEDICINE

## 2023-06-21 PROCEDURE — 99215 OFFICE O/P EST HI 40 MIN: CPT | Mod: PBBFAC | Performed by: INTERNAL MEDICINE

## 2023-06-21 PROCEDURE — 99999 PR PBB SHADOW E&M-EST. PATIENT-LVL V: ICD-10-PCS | Mod: PBBFAC,,, | Performed by: INTERNAL MEDICINE

## 2023-06-21 NOTE — PATIENT INSTRUCTIONS
Take tylenol arthritis one pill three times daily  Start gabapentin 300mg po TID   We placed PMR consult

## 2023-06-21 NOTE — PROGRESS NOTES
Rapid3 Question Responses and Scores 6/21/2023   MDHAQ Score 0.8   Psychologic Score 3.3   Pain Score 6   When you awakened in the morning OVER THE LAST WEEK, did you feel stiff? Yes   If Yes, please indicate the number of hours until you are as limber as you will be for the day 1   Fatigue Score 6   Global Health Score 6   RAPID3 Score 4.89     Answers submitted by the patient for this visit:  Rheumatology Questionnaire (Submitted on 6/21/2023)  fever: No  eye redness: No  mouth sores: No  headaches: No  shortness of breath: No  chest pain: No  trouble swallowing: No  diarrhea: No  constipation: No  unexpected weight change: No  genital sore: No  dysuria: No  During the last 3 days, have you had a skin rash?: No  Bruises or bleeds easily: Yes  cough: Yes

## 2023-06-21 NOTE — PROGRESS NOTES
"Subjective:       Patient ID: Mamta Bates is a 78 y.o. female.    Chief Complaint: Fibromyalgia    HPI   Patient is a 78 yo F with fibromyalgia and HTN who presents for Rheumatology consultation for fibromyalgia. She was apparently diagnosed with fibromyalgia in 2015 but how she got this diagnosis is unclear. She was apparently seeing Rheumatologist Dr. Mitchell around 2016 or so. She was recently evaluated by Neurology after a hospital encounter for acute encephalopathy due to hypertension (from medication noncompliance) which resolved after her blood pressure was controlled.    From note 5/2022: The patient is a 77-year-old female past medical history fibromyalgia, hypertension, hysterectomy who presents with her  for concern for altered mental status including slurred speech, pain to lower extremities, seeing lights, and not making sense. Patient's her  reports that he believes that she has had some early signs of dementia at getting worse over the past few months however the patient does not agree to seek any medical care until today.     From Neurologist Dr. Javier's note 7/24/22: "Ms. Bates's clinical presentation has features of both Behavioral variant Frontotemporal Dementia (bvFTD) (Rasmallikaky et al., Brain 2011) and Semantic variant Primary Progressive Aphasia (svPPA) (Henry ML, et al. Neurology. 2011). The pathology underlying Ms. 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 She complains of pain all over, mostly upper and lower back with pain radiating down the legs. Pain is worse with activity, worst at the end of the day. She gets muscle cramps at night. Her pain is worst on the left buttocks and left calf. She sometimes gets numbness in the feet, but not in the hands. She " complains about sometimes having left 4th trigger finger. She had a rhizotomy in the low back years ago at [facility]. She was taking meloxicam 7.5 mg one or two tablets daily, BC powder daily. She was taking tramadol but with her recent neurological changes, she was advised to stop it. Once a month she gets flare ups of the pain where it's bad enough that she gets short of breath and chest pain. She's now sleeping well through the night since starting donepezil and Seroquel. She's tried Cymbalta; apparently didn't tolerate it well. She tried Lyrica but it might not have helped. She never tried gabapentin due to reading about the side effects. She tried water exercises; not sure if it helped.      Interval history:she has pain in lower back and lower legs. She is taking meloxicam 7.5 mg in morning.        Social Hx: Never smoker, no alcohol use (never was much of a drinker), no drug use. 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  Family Hx: No family hx of autoimmune disease. There is DM in the family.    Widespread pain index  Note the areas which the patient has had pain over the last week:                   Shoulder-girdle, left x               Shoulder-girdle, right x                         Upper arm left                        Upper arm right                         Lower arm left                       Lower arm right    Hip (buttock, trochanter) left x  Hip (buttock, trochanter) right x                           Upper leg, left                          Upper leg, right                           Lower leg, left x                         Lower leg, right x                                     Jaw, left                                    Jaw, right                                        Chest                                   Abdomen                               Upper back x                              Lower back x                                        Neck   Score  "will be from 0-19: 8                                         Symptom severity score  Fatigue 1  Waking Unrefreshed 1  Cognitive Symptoms    0 = no problem, 1=slight or mild problem 2= moderate; considerable problems often present and/or at a moderate level, 3 = severe, pervasive, continuous, life disturbing problem  For each of the 3 symptoms, indicate the level of severity over the past week using the Scale.  The symptom severity score is the sum of the severity of the 3 symptoms (fatigue, waking unrefreshed, and cognitive symptoms) plus the number of the following symptoms occurring during the previous 6 months:   Headaches 0  Pain or cramps in the lower abdomen 0  Depression 0  The final score is between 0 and 12: 2                                          Criteria  Patient has fibromyalgia if the following 3 conditions are met:  1.  Widespread pain index greater than or equal to 7 and symptom severity score greater than or equal to 5 or widespread pain index between 3- 6, and symptom severity score greater than or equal to 9.    2.  Symptoms have been present in a similar level for at least 3 months  3.  The patient does not have a disorder that would otherwise sufficiently explain the pain          Review of Systems   Constitutional:  Negative for fever and unexpected weight change.   HENT:  Negative for mouth sores and trouble swallowing.    Eyes:  Negative for redness.   Respiratory:  Negative for cough and shortness of breath.    Cardiovascular:  Negative for chest pain.   Gastrointestinal:  Negative for constipation and diarrhea.   Genitourinary:  Negative for dysuria and genital sores.   Musculoskeletal:  Positive for back pain.   Skin:  Negative for rash.   Neurological:  Positive for numbness. Negative for headaches.   Hematological:  Does not bruise/bleed easily.       Objective:   BP (!) 153/85   Pulse 102   Ht 5' 4" (1.626 m)   Wt 64.1 kg (141 lb 5 oz)   BMI 24.26 kg/m²      Physical Exam "   Constitutional: She is oriented to person, place, and time. No distress.   HENT:   Head: Normocephalic and atraumatic.   Eyes: Pupils are equal, round, and reactive to light.   Cardiovascular: Normal rate and regular rhythm.   No murmur heard.  Pulmonary/Chest: Effort normal and breath sounds normal. No respiratory distress. She has no wheezes.   Abdominal: Soft. Bowel sounds are normal. There is no abdominal tenderness.   Musculoskeletal:         General: No deformity.      Right shoulder: Normal.      Left shoulder: Normal.      Right elbow: Normal.      Left elbow: Normal.      Right wrist: Normal.      Left wrist: Normal.      Cervical back: Normal range of motion.      Right knee: Normal.      Left knee: Normal.      Comments: No synovitis or joint tenderness. Low back pain is better with palpation rather than worse. Full ROM intact without pain in neck, shoulders, elbows, wrists, low back, hips, knees. Straight leg test is negative bilaterally.   Neurological: She is alert and oriented to person, place, and time.   Skin: Skin is warm and dry.   Psychiatric: Affect and judgment normal.       Right Side Rheumatological Exam     Examination finds the shoulder, elbow, wrist, knee, 1st PIP, 1st MCP, 2nd PIP, 2nd MCP, 3rd PIP, 3rd MCP, 4th PIP, 4th MCP, 5th PIP and 5th MCP normal.    Muscle Strength (0-5 scale):  Neck Flexion:  5  Neck Extension: 5  Deltoid:  5  Biceps: 5/5   Triceps:  5  : 5/5   Iliopsoas: 5  Quadriceps:  5   Distal Lower Extremity: 5    Left Side Rheumatological Exam     Examination finds the shoulder, elbow, wrist, knee, 1st PIP, 1st MCP, 2nd PIP, 2nd MCP, 3rd PIP, 3rd MCP, 4th PIP, 4th MCP, 5th PIP and 5th MCP normal.    Muscle Strength (0-5 scale):  Neck Flexion:  5  Neck Extension: 5  Deltoid:  5  Biceps: 5/5   Triceps:  5  :  5/5   Iliopsoas: 5  Quadriceps:  5   Distal Lower Extremity: 5         No data to display     Assessment:       1. Fibromyalgia            Plan:       Problem  List Items Addressed This Visit          Orthopedic    Fibromyalgia     Patient is a 76 yo F with fibromyalgia and HTN who presents for Rheumatology consultation for fibromyalgia.   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  Patient recently diagnosed with dementia.  I discussed with  that we do not think she has an inflammatory arthritis so she needs to follow up with pain doctor.  -start gabapentin 300mg po TID  Start tylenol arthritis every 8 hours  PMR consult    40* minutes of total time spent on the encounter, which includes face to face time and non-face to face time preparing to see the patient (eg, review of tests), Obtaining and/or reviewing separately obtained history, Documenting clinical information in the electronic or other health record, Independently interpreting results (not separately reported) and communicating results to the patient/family/caregiver, or Care coordination (not separately reported).

## 2023-06-26 ENCOUNTER — TELEPHONE (OUTPATIENT)
Dept: NEUROLOGY | Facility: CLINIC | Age: 78
End: 2023-06-26
Payer: MEDICARE

## 2023-07-11 ENCOUNTER — OFFICE VISIT (OUTPATIENT)
Dept: NEUROLOGY | Facility: CLINIC | Age: 78
End: 2023-07-11
Payer: MEDICARE

## 2023-07-11 DIAGNOSIS — F02.A18 OTHER MILD FRONTOTEMPORAL DEMENTIA WITH OTHER BEHAVIORAL DISTURBANCE: Primary | ICD-10-CM

## 2023-07-11 DIAGNOSIS — G31.09 OTHER MILD FRONTOTEMPORAL DEMENTIA WITH OTHER BEHAVIORAL DISTURBANCE: Primary | ICD-10-CM

## 2023-07-11 DIAGNOSIS — G89.29 OTHER CHRONIC PAIN: ICD-10-CM

## 2023-07-11 DIAGNOSIS — R46.89 OPPOSITIONAL BEHAVIOR: ICD-10-CM

## 2023-07-11 DIAGNOSIS — F34.9 PERSISTENT MOOD DISORDER: ICD-10-CM

## 2023-07-11 DIAGNOSIS — G31.09 BEHAVIORAL VARIANT FRONTOTEMPORAL DEMENTIA: ICD-10-CM

## 2023-07-11 DIAGNOSIS — F02.818 BEHAVIORAL VARIANT FRONTOTEMPORAL DEMENTIA: ICD-10-CM

## 2023-07-11 DIAGNOSIS — M79.7 FIBROMYALGIA: ICD-10-CM

## 2023-07-11 PROCEDURE — 99215 OFFICE O/P EST HI 40 MIN: CPT | Mod: 95,,, | Performed by: PSYCHIATRY & NEUROLOGY

## 2023-07-11 PROCEDURE — 96116 NUBHVL XM PHYS/QHP 1ST HR: CPT | Mod: 95,59,, | Performed by: PSYCHIATRY & NEUROLOGY

## 2023-07-11 PROCEDURE — 99417 PROLNG OP E/M EACH 15 MIN: CPT | Mod: 95,,, | Performed by: PSYCHIATRY & NEUROLOGY

## 2023-07-11 PROCEDURE — 96116 PR NEUROBEHAVIORAL STATUS EXAM BY PSYCH/PHYS: ICD-10-PCS | Mod: 95,59,, | Performed by: PSYCHIATRY & NEUROLOGY

## 2023-07-11 PROCEDURE — 99417 PR PROLONGED SVC, OUTPT, W/WO DIRECT PT CONTACT,  EA ADDTL 15 MIN: ICD-10-PCS | Mod: 95,,, | Performed by: PSYCHIATRY & NEUROLOGY

## 2023-07-11 PROCEDURE — 99215 PR OFFICE/OUTPT VISIT, EST, LEVL V, 40-54 MIN: ICD-10-PCS | Mod: 95,,, | Performed by: PSYCHIATRY & NEUROLOGY

## 2023-07-11 RX ORDER — ACETAMINOPHEN, DIPHENHYDRAMINE HCL, PHENYLEPHRINE HCL 325; 25; 5 MG/1; MG/1; MG/1
TABLET ORAL
Qty: 30 TABLET | Refills: 3 | Status: SHIPPED | OUTPATIENT
Start: 2023-07-11 | End: 2023-07-11 | Stop reason: SDUPTHER

## 2023-07-11 RX ORDER — PYRIDOXINE HCL (VITAMIN B6) 100 MG
TABLET ORAL
Qty: 180 TABLET | Refills: 1 | Status: SHIPPED | OUTPATIENT
Start: 2023-07-11

## 2023-07-11 RX ORDER — CLONAZEPAM 0.5 MG/1
0.5 TABLET, ORALLY DISINTEGRATING ORAL DAILY PRN
Qty: 10 TABLET | Refills: 0 | Status: SHIPPED | OUTPATIENT
Start: 2023-07-11 | End: 2024-03-14

## 2023-07-11 RX ORDER — ACETAMINOPHEN, DIPHENHYDRAMINE HCL, PHENYLEPHRINE HCL 325; 25; 5 MG/1; MG/1; MG/1
TABLET ORAL
Qty: 30 TABLET | Refills: 3 | Status: SHIPPED | OUTPATIENT
Start: 2023-07-11

## 2023-07-11 RX ORDER — PYRIDOXINE HCL (VITAMIN B6) 100 MG
TABLET ORAL
Qty: 180 TABLET | Refills: 1 | Status: SHIPPED | OUTPATIENT
Start: 2023-07-11 | End: 2023-07-11 | Stop reason: SDUPTHER

## 2023-07-11 NOTE — PROGRESS NOTES
[facility]     PATIENT: Mamta Bates  VISIT DATE: 2023  MRN: [id number]  PRIMARY PROVIDER: Ibeth Harris MD  : 1945       Chief complaint: Progressive Cognitive Impairment     History of present illness:      The patient is a 78-year-old right-handed female who presents today to the [facility] due to concerns related to Progressive Cognitive Impairment.  The patient is accompanied by the  who participates in providing history.  Additional information is obtained by reviewing available medical records.     Relevant Background/Context  Known Relevant Family history:  Mother  of 74 y/o CAD  Father  in NH old age (mid-75) unknown issues, possible NCD  Sister - DM2  Sister in wheelchair  60s 'fell off'  Neurocognitive Disorder:  The family denies a history of early/late onset cognitive impairment.  Movement Disorder:  The family denies a history of movement disorder (PD, PDD, tremor, etc).  Motorneuron Disorder:  The family denies a history of motor neuron disease (ALS).  Developmental Disorder:  The family denies a history of developmental learning disorder (Dyslexia, ADHD, ASD, etc.).  Psychiatric Disorder:  Sister - MDD related to LE dysfunction  Known Relevant Genetics:  There is no known relevant genetic testing available.  Developmental Milestones:  The patient/family report no known birth complications or early life problems. The patient met all developmental milestones.  Education/Learning Capacity:  The patient/family report no signs or symptoms suggestive of developmental learning disorder.  HS.  Associated degree in business - patient is unable to describe in meaningful detail due to aphasia  Estimated Educational Experience: 14 years of formal education.  Social History:  Patient lives with her  who is the primary caregiver. She is limited additional social support. She  "has no living relatives is the "last overlying ". And has no children. since detention for the age 65 patient has not been engaged any significant meaningful social or community activities. Family reports that following detention she was actively traveling with her  for. However this stopped early. She was diagnosed with fibromyalgia sometime within 5 years detention and has reported minimal social or cognitive stimulation in the form of social with her cognitive exercises subsequently thereafter.  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 She reports being the  of [employer] system for extended period of time and frequently references politicians and advocacy groups that she has been involved with. Patient retired at the age [age] for unclear reasons.  Retired/Quit: 0  Relevant Medical History:  HTN  Fibromyalgia     Neurocognitive Disorder Features  Onset/Duration:  Jul 2020 (~3-year)  First Symptom:  Memory impairment  Progression:  Gradually Progressive  Clinical Course:  Hospitalization (05/04/2022)  Type: Chart Review. The patient is a 77-year-old female past medical history fibromyalgia, hypertension, hysterectomy who presents with her  for concern for altered mental status including slurred speech, pain to lower extremities, seeing lights, and not making sense. Patient's her  reports that he believes that she has had some early signs of dementia at getting worse over the past few months however the patient does not agree to seek any medical care until today. He says it has been hard to convince her to seek any treatment. He states that sometimes she is hallucinating and sometimes she has garbled speech. He states today he left the house this morning at 9:00 a. M. To run errands. At that time she was not "normal" but that she was at her baseline. When he returned home about 2-1/2 hours ago he noted that she seemed to have slurred speech, " reporting seeing lights, complaining severe pain to her legs.  Electronic Medical Record (05/06/2022)  Type: Chart Review. Pt recently D/C'd from hospital for HTN encephalopathy. Concern from pt's her  for ongoing cognitive decline past couple of years but did not want to see a neurologist. Started on seroquel 12. 5mg QHS at D/C. CTH on file with mild volume loss per report.  Primary Care Provider (05/11/2022)  Type: Chart Review. Ms. the patient is a 77 year-old female with fibromyalgia and HTN who is presenting to clinic as a hospital follow-up appointment. She was recently hospitalized with encephalopathy secondary to a hypertensive urgency that resolved with re-initiation of her anti-hypertensives. She was discharged home on nifedipine and seroquel was started due to concerns of chronic changes in mentation. Since discharged home she has been doing well. Her  has been monitoring her blood pressure and has had to hold the nifedipine several times due to systolic blood pressure readings of 100. Per her , she has been having intermittent changes in mental status for the past months- he relayed this concern to the primary team while she was hospitalized and an appointment has been scheduled with neurology for further workup.  [facility] - Jaison Javier MD. Neurologist (07/20/2022)  Type: Chart Review. The patient presents with her , who is the primary historian. Additional history is gathered from a review of previous medical records. 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 The circumstances surrounding her early alf are unclear. The patient professes significant pride in her work and has not been very active since her alf. The family reported within a few years of her alf, the patient was diagnosed with fibromyalgia in the setting of chronic pain and cramps. The earliest reference to this  diagnosis in the medical chart was in 2015. It is unclear how this diagnosis was made, her specific symptoms, and whether there was an actual association in time with the patient's cognitive impairment. The patient denies any personal or her family history of autoimmune disorders. The onset of the patient's symptoms is unclear; however, her family thinks it happened during early COVID. The patient denies yandy COVID before or during the beginning of her symptoms. Her family denies any other overt changes in their daily behavior or medications. Over the last two years, her family reported in no specific order progressive incomprehensive speech, garbled talk with agitation, irritability, and apathy. The patient has always been active, social, and engaged; however, she has become more withdrawn and apathetic watching TV. She has become mildly paranoid and disinterested. The family reported an overall change in her personality; however, more so a magnification of baseline tendencies rather than a fundamental shift in temperament. Her family says she has been having increasing episodes of confusion and miscommunication between the patient and her family resulting in growing irritability and frustration, which compounds deficits and has resulted in agitation. During periods of frustration with agitation patient's cognitive deficits, including memory deficits, appear to worsen the patient's behavior programs more difficult to manage. Between 2020 and 2021, deficits gradually increased, becoming more possessive without over compulsions worsening memory and irritability. During this time frame, her family denies any overt signs of disinhibition, lack of sympathy or empathy, change in eating behavior, restlessness, movement disorders, or motor changes. From [id number] her family has requested a medical professional see her; however, she has refused this at every opportunity. The patient does have significant semantic  aphasia deficits. She can describe in unclear words that she does not feel intervention would change anything and that her deficits are due to age. In 2022, the patient's cognitive deficits cristina to medication noncompliance and interference with instrumental activities of daily living. The patient presented to the hospital in early 2022 with hypertensive encephalopathy, likely due to medication noncompliance. Following this hospitalization, staff recognized the patient's neurocognitive deficits, and she was referred to our neurocognitive Clinic for further evaluation. On presentation, the patient has diminished interpersonal responsiveness to social cues and is loud, and fast with somewhat incomprehensible speech, semantic aphasia, limited insight, and mild disinhibition. The observations made above, were discussed with the patient and her family. The family reports that minimum a 2 year history progressive multi domain cognitive impairment sufficient interfere with instrumental activities of daily living. Neurocognitive assessment shows language predominant multi major cognitive impairment. Neurological imaging is consistent with frontal temporal lobar degeneration. 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 Is unclear whether the patient is actively having hallucinations or whether not this is due to semantic aphasia. Recommend monitoring. Given patient's age likely mixed pathology. Recommend a trial of donepezil with follow-up in 1 month ongoing screening labs for reversible cause of cognitive impairment. Following this may consider increasing Seroquel for behavioral disinhibition and referral to speech therapy for additional care support.  [facility] - Jaison Javier MD. Neurologist (06/01/2023)  Type: Chart Review. 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 The patient has significant oppositional care. The patient was subsequently lost to follow-up did not complete her workup. Serum laboratories were within normal limits. No evidence of elevated neurofilament light chain. No evidence of an autoimmune condition. Since last time seen patient's semantic dementia has gradually worsened. On presentation the patient is pleasant appropriate with fluent around chaotic speech with unclear meaning phrases. She is frankly perseverative mildly obsessive however pleasant. her family expresses concerns regarding progressive decline in asking if there any new therapies. We expressed that there is no disease modifying therapy for semantic dementia at this time. her family versus understanding. Recommend focusing on modifiable risk factors. The patient has a notable history of B12 deficiency recommend restarting B12. Regarding patient's bothersome fibromyalgia like symptoms with increased bilateral total lower extremities. We expressed concerns regarding possible opportunity related to cognitive dysfunction. The patient is currently having severe cramping once every 1-2 weeks. We discussed medication options prior trials. Recommend starting gabapentin vitamin B6 for lower extremity hyper excitability and p. R. N. Clonazepam for severe cramping that occurs every 1-2 weeks. We will refill meloxicam will recommend for the patient following up with primary rheumatologist. Recommend repeating B12 panel given history of deficiency without replenishment. The patient expresses understanding like to wait. May also start Belsomra 10 mg q. H. S. For insomnia. We answered all patient's and her family's questions. The observations made above, were discussed with the patient and her family.     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 Recommend starting Belsomra for insomnia. Recommend repeating blood work for additional biomarker testing. Blood work was not completed. On presentation today her family reports the patient declines blood work or additional investigation. We discussed disease modifying therapy for Alzheimer's disease we discussed that the patient would likely not be a candidate nor would likely respond and minimal we would need to do additional diagnostic testing before confirming the patient was a candidate. The patient refused blood work the patient will likely refuse lumbar puncture. No additional diagnostic management. The patient and her family were not able to start vitamin B12 B6 clonazepam as initially prescribed. Unclear exactly what the issue was. We have called pharmacy pharmacy says they received medication and it has been available for the last month. Recommend following up with social work services as the patient is oppositional to additional diagnostics. All as needed.  Unresolved Concern(s) reported by patient/family:  Semantic aphasia  Opposition to care     Review of cognitive, visuospatial, motor, sensory, and behavioral systems:     Memory:   The patient's memory has worsened in the past few years.  She does repeat statements or asks the same question repeatedly.  She does have difficulty remembering recent important conversations.  She does not have difficulty remembering recent events.  She does forget information within minutes.  Her recent retrograde memory is impaired.  Her remote memory is intact.  Attention:   The patient's attention and concentration are impaired.  She does not have attentional fluctuations.  She does have difficulty with selective attention.  She does become easily distracted.  She does have difficulty with divided attention.  Executive:   The patient's cognitive processing speed is slower.  She does have difficulty with working memory.  She does  misplace personal items (e.g., keys, cell phone, wallet) more frequently.  She does have difficulty keeping track of her medications.  She does have difficulty with planning/organizing/completing multistep tasks.  She does have difficulty with executive attention.  She does not have difficulty with flexible thinking.  She does not difficulty with self control.  She is exhibiting new symptoms that suggest they have become more impulsive, rash and/or careless.  Her judgment is impaired.  Language:   The patient's speech is affected.  She does forget people's names more frequently.  She does have word-finding difficulties.  Her speech is fluent and non-effortful.  Her speech is not grammatically intact.  She does not make word substitutions.  She does not have difficulty reading.  She appears to have impaired comprehension.  Visuospatial:   The patient has new visuospatial problems.  She has become confused or disoriented in *new*, unfamiliar places.  She does not have trouble with navigation.  She does not get lost in familiar places.  She does not have visuospatial disorientation.  She does not have difficulty recognizing objects or faces.  Motor/Coordination:   The patient does have difficulty with walking.  She does feel imbalanced.  She denies having fallen.  She does not appear to have new muscle weakness.  She does have difficulty buttoning shirts, operating zippers, or manipulating tools/utensils.  Her handwriting has not become micrographic.  She does have a resting tremor.  She denies having any new involuntary movements and/or muscle jerking.  She does not have swallowing difficulty.  She denies new muscle cramps and twitching.  Sensory:   The patient denies new numbness, tingling, paresthesias, or pain.  The patient denies a loss of vision, blurry vision, or double vision.  The patient denies new loss of hearing or worsening tinnitus.  The patient denies anosmia.  Sleep:   The patient reports difficulty  sleeping.  The patient does not snore or have witnessed apneas while sleeping.  When she wakes up in the morning, she does feel well-rested.  She denies dream-enactment behavior.  She denies symptoms suggestive of restless leg syndrome.  Behavior:   The patient's personality has changed. Comment: exaggeration of baseline  She does have symptoms of disinhibition and social inappropriateness. Comment: exaggeration of baseline  She is exhibiting symptoms to suggest a loss of manners and/or decorum. Comment: exaggeration of baseline  She does not appear apathetic or has decreased motivation.  She does not appear to have a change in inertia.  The patient's emotional expression has changed. Comment: exaggeration of baseline  She does have emotional blunting or lability. Comment: exaggeration of baseline  She does have symptoms of irritability and mood lability.  She does not have symptoms of agitation, aggression, or violent outbursts.  Her insight into his disease and situation is impaired.  Her personal hygiene is intact.  She is not exhibiting a diminished response to other people's needs and feelings.  She is exhibiting diminished social interest, interrelatedness, and/or personal warmth.  She does appear restless.  She denies new and/or worsening simple repetitive behaviors.  Her speech has become simplified and/or repetitive/stereotyped.  She reports symptoms that are suggestive of new/worsening complex repetitive/ritualistic compulsions and behaviors.  She does not have symptoms of hyper-religiosity or dogmatism.  Her interests/pleasures have not become restrictive, simplified, interrupting, or repetitive.  She denies a change of self-stimulating behavior.  She denies any changes in eating behavior.  She denies increased consumption of food or substances.  She denies oral exploration or consumption of inedible objects.  Psychiatric:   She does not feel depressed.  She is exhibiting symptoms of social  withdrawal/indifference.  She denies anxiety.  She does not exhibit cycling behavior.  She does exhibit hyperactive behavior.  She is exhibited symptoms of paranoia.  She does not have delusions.  She does not have hallucinations. Comment: possible? Unclear if language impairment and not able to vocalize her thoughts accurately or actual hallucination  She does not have a history of sensitivity to neuroleptic/psychotropic medications.  Medical Review of Systems:   The patient does not have constipation.  The patient does not have urinary incontinence.  The patient denies orthostatic lightheadedness.  The patient's weight is stable.  Functional status:  Difficulty performing the following Instrumental ADLs:  Household chores: Yes  Food Preparation: No  Shopping: Yes  Ability to Handle Finances: Yes  Transportation/Driving: Yes  Household Appliances/Stove: Yes  Laundry: Yes  Difficulty performing the following Basic ADLs:  Dressing: No  Bathing: No  Toileting: No  Personal hygiene and grooming: No  Feeding: No  Care Management:  Patient/Family Safety Concerns:  Medication Adherence: No  Home Safety: No  Wandered: No  Firearms: No  Fall Risk: No  Home Alone: No          Past Medical History:   Diagnosis Date    Fibromyalgia     Hypertension        Past Surgical History:   Procedure Laterality Date    HYSTERECTOMY         Family History   Problem Relation Age of Onset    Heart disease Mother        Social History     Socioeconomic History    Marital status:    Tobacco Use    Smoking status: Never    Smokeless tobacco: Never   Substance and Sexual Activity    Alcohol use: No    Drug use: No    Sexual activity: Yes     Partners: Male       Medication:     Current Outpatient Medications on File Prior to Visit   Medication Sig Dispense Refill    clonazePAM (KLONOPIN) 0.5 MG disintegrating tablet Take 1 tablet (0.5 mg total) by mouth daily as needed (cramping pain). 10 tablet 0    cyanocobalamin, vitamin B-12, 5,000  mcg Subl Place one under tongue once a day for 1 month, then continue to take under tongue per week (Patient not taking: Reported on 6/21/2023) 30 tablet 3    ezetimibe (ZETIA) 10 mg tablet Take 10 mg by mouth once daily.      gabapentin (NEURONTIN) 300 MG capsule Take 1 capsule (300 mg total) by mouth 3 (three) times daily. 90 capsule 2    guaiFENesin 100 mg/5 ml (ROBITUSSIN) 100 mg/5 mL syrup Take 200 mg by mouth 3 (three) times daily as needed for Cough.      magnesium oxide (MAG-OX) 400 mg (241.3 mg magnesium) tablet Take 400 mg by mouth once daily.      meloxicam (MOBIC) 7.5 MG tablet Take 1 tablet (7.5 mg total) by mouth daily as needed for Pain. 30 tablet 2    NIFEdipine (PROCARDIA-XL) 60 MG (OSM) 24 hr tablet Take 1 tablet (60 mg total) by mouth once daily. 90 tablet 1    pyridoxine, vitamin B6, (B-6) 100 MG Tab Take 3 tablets twice a day for 1 month, then 2 tablets twice a day for 1 month, then continue 1 tablet twice a day (Patient not taking: Reported on 6/21/2023) 180 tablet 1    QUEtiapine (SEROQUEL) 25 MG Tab Take 1/2 tablet by mouth every evening 90 tablet 1    tiZANidine (ZANAFLEX) 2 MG tablet Take 0.5-1 tablets (1-2 mg total) by mouth every 8 (eight) hours as needed (back pain). 90 tablet 2    zolpidem (AMBIEN) 5 MG Tab Take 1 tablet (5 mg total) by mouth every evening. 30 tablet 1     No current facility-administered medications on file prior to visit.        Review of patient's allergies indicates:  No Known Allergies    Medications Reconciliation:   I have reconciled the patient's home medications and discharge medications with the patient/family. I have updated all changes.  Refer to After-Visit Medication List.    Objective:  Vital Signs:  There were no vitals filed for this visit.  Wt Readings from Last 3 Encounters:   06/21/23 1558 64.1 kg (141 lb 5 oz)   06/01/23 0857 61 kg (134 lb 7.7 oz)   03/22/23 1036 61.7 kg (136 lb 0.4 oz)     There is no height or weight on file to calculate BMI.     "     Neurological examination:  Mental Status:   Her appearance deviates from typical expectations given their age and context.  Throughout the interview, she behaved abnormally and was not cooperative.  The patient's energy level is abnormal.  Her orientation is not entirely accurate.  Her attention/concentration is impaired.  She can complete three-step commands.  Her thought process is not logical or goal-oriented.  She demonstrated impaired insight based on actions, awareness of her illness, plans for the future.  She demonstrated impaired judgment based on actions and plans for the future.  She has no evidence of hallucinations (auditory, visual, olfactory).  She has no evidence of delusions (paranoid, grandiose, bizarre).  Cranial Nerves:   Her pupils were normal.  Her visual fields were full to confrontation in all quadrants.  Her ocular pursuit in the horizontal and vertical plane was complete.  Her saccadic initiation, velocity, and amplitude are normal.  Her facial strength was normal.  Her facial expression was symmetric and appropriate to the context.  Her facial sensation was intact to light touch bilaterally.  Her hearing was normal bilaterally.  Her tongue showed no evidence of scalloping.  She tongue movement with normal.  Speech/Language:   The patient's speech was not completely fluent and non-effortful.  Her speech timbre is abnormal. Comment: loud; loud  Her speech rate is abnormal. Comment: fast; fast  She has no articulation (segmental features) errors.  She has no speech dysdiadochokinesia with repetition of syllables such as "/PA/, /TA/, /KA/, /OM/".  She made no errors during the repetition of rapid syllables and or words such as "caterpillar" "", and "huckleberry"  She has no repetition errors of rapid sequences of consonants, such as in "Spiritism Buddhism" or "[employer]".  She has no prosody (suprasegmental features) errors.  Her stress assessment showed no " "repetition errors in linguistically complex words, including multisyllabic words ("planetarium," "questionable," "accomplishment," "phonetic.  The patient's speech is not dysarthric.  She does not have hyperkinetic dysarthria.  The patient showed evidence of anomia.  She showed evidence of anomia during spontaneous speech.  She showed evidence of anomia during confrontational naming.  She makes no phonological loop errors.  She makes no errors during the repetition of gibberish words (e.g., "Supercalifragilisticexpialidocious," "Pigglywiggly," "Woospiedoo," "Zowzy," "Bazinga").  She makes no errors during the repetition of complex meaningless phrases (e.g., "The horse raced past the barn fell.", "The complex houses  and single soldiers and their families," "Wishes are hopping, and trees are west," and "Brushing liked to karen madison's direction").  She can comprehend commands that cross the midline (e.g., with your left thumb, touch your right ear).  She has difficulty comprehending commands that depend on syntax.  Her speech is not grammatically intact.  She makes superordinate errors when asked to draw an animal.  Motor:   The patient's bilateral upper extremity muscle bulk is appropriate.  The patient's upper extremity muscle tone is increased. Comment: Unable to relax, b/l paratonia R>L cant assess for rigidity ; Unable to relax, b/l paratonia R>L cant assess for rigidity  The patient's bilateral upper extremity muscle tone does not suggest spasticity.  There is evidence of paratonia. Comment: Muscle tone is increased and there is evidence of paratonia.; Muscle tone is increased and there is evidence of paratonia.  Assessment of motor strength was symmetric and at minimal anti-gravity.  There is no pronator or downward drift.  There is no myoclonus observed in The patient's bilateral upper and lower extremities.  There are no fasciculations observed in The patient's bilateral upper and lower " extremities.  Coordination:   She has no bilateral upper extremity limb dysmetria or past pointing on finger-nose-finger bilaterally.  She has no limb dysdiadochokinesia of the upper extremity on the pronation/supination test and screwing in a light bulb or lower extremity during tapping ball of each foot bilaterally.  She has a visible tremor.  She has no kinetic tremor bilaterally.  She has a postural tremor.  She has no resting tremor bilaterally.  She has evidence of interhemispheric motor control deficits.  She demonstrates evidence of motor overflow.  She has no dyskinetic movements.  The patient's upper extremity fine motor coordination was abnormal. Comment: mild R>L; mild R>L  The patient's bilateral upper extremity coordination with finger tapping, pronation/supination, and the open-close fist showed slowing.  The patient's bilateral upper extremity coordination with finger tapping, pronation/supination, and the open-close fist showed hypometria.  The patient's bilateral upper extremity coordination with finger tapping, pronation/supination, and the open-close fist showed dysrhythmia.  Higher Cortical Function:   The patient showed no evidence of simultanagnosia (Navon hierarchical letters).  She demonstrates no evidence of dorsal simultanagnosia (overlapping objects).  She demonstrates no evidence of ventral simultanagnosia (complex picture synthesis).  The patient showed evidence of visuospatial constructional dysfunction.  The patient showed evidence of angular gyrus disconnection (insular-operculum).  She has evidence of dysgraphia.  The patient showed evidence of apraxia.  She showed no evidence of ideomotor apraxia performing tool-use pantomimes (e.g., use a hammer, use a screwdriver, use a comb, flip a coin, waving goodbye).  She showed no evidence of ideational apraxia (e.g., taking off and putting on shoes, folding paper into an envelope).  She showed evidence of limb-motor apraxia during  mimicking complex bimanual hand shapes.  She showed no evidence of buccofacial apraxia (e.g., blow out a candle, puff out cheeks, and whistle).  She showed dysexecutive behavior.  She showed no utilization or imitation behavior.  She has evidence of perseverative or stereotyped behavior.  She demonstrated stimulus-bound behavior.  Sensory:   Her cortical sensory assessment demonstrated no neglect bilaterally.  She he had no astereognosis (paper clip, ring, dime) bilaterally in the palms.  She he had no agraphesthesia (drawing numbers) in the palms.  Reflexes:   Reflexes were symmetric and 2+ at biceps, 2+ triceps, and 2+ brachioradialis, 2+ at the knees bilaterally, there was no cross-abductor sign, 2+ in the bilateral ankles.  Gait:   She can arise from a chair and sit back down without using their arms.  Her gait was normal.  When attempting to walk abnormally (heels, tiptoes, tandem), she makes no errors.  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  Severe Language Impairment: She scored >3 standard deviations below the norm on at least one measure. She had difficulty with naming, abstract, semantic association, Semantics.  Moderate Visuospatial Impairment: visuospatial construction, simultanagnosia.  Moderate Executive Impairment: She scored >1.5 standard deviations below the norm on at least one measure. She had difficulty with working memory, lexical fluency.  Moderate Attention Impairment: orientation.  MMSE 17/30: MMSE Score suggestive of moderate cognitive impairment.  MOCA 10/30: MOCA Score suggestive of moderate cognitive impairment.  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     Neurocognitive/Neurobehavioral Evaluation  completed on 2023-07-11    Neuropsychiatric/Behavioral Focused Evaluation Assessment    BEHAV5+ 4/6 See ROS section for a full description   Laboratories:     Lab Date Value [Reference]   Autoimmune/Paraneoplastic Screening           NAILA 2022, Jul-20    Negative       CRP 2022, Jul-20    2.5 [0.0 - 8.2 mg/L]      Haptoglobin 2022, Jul-20    159 [30 - 250 mg/dL]      Sed Rate 2022, Jul-20    11 [0 - 36 mm/Hr]      Metabolic Screening   Ferritin 2022, Jul-20    46 [20.0 - 300.0 ng/mL]      Hemoglobin A1C External 07/20/2022  4.9 [4.0 - 5.6 %]      Methlymalonic Acid 07/20/2022  0.21      T4 Total 07/20/2022  7.6 [4.5 - 11.5 ug/dL]      TSH 05/04/2022  1.946 [0.400 - 4.000 uIU/mL]      Glucose 2023, Mar-22    76 [70 - 110 mg/dL]      Iron 2022, Jul-20    55 [30 - 160 ug/dL]      Saturated Iron 2022, Jul-20    14 (L) [20 - 50 %]      TIBC 2022, Jul-20    404 [250 - 450 ug/dL]      Transferrin 2022, Jul-20    273 [200 - 375 mg/dL]      Albumin 2023, Mar-22    4.2 [3.5 - 5.2 g/dL]      Alkaline Phosphatase 2023, Mar-22    97 [55 - 135 U/L]      ALT 2023, Mar-22    15 [10 - 44 U/L]      AST 2023, Mar-22    26 [10 - 40 U/L]      BILIRUBIN TOTAL 2023, Mar-22    0.6 [0.1 - 1.0 mg/dL]      PROTEIN TOTAL 2023, Mar-22    7.9 [6.0 - 8.4 g/dL]      Cholesterol 2022, May-05    189 [120 - 199 mg/dL]      HDL 2022, May-05    52 [40 - 75 mg/dL]      Non-HDL Cholesterol 2022, May-05    137 [mg/dL]      Triglycerides 07/20/2022  75 [30 - 150 mg/dL]      B12 Def. Methylmalonic Acid 07/20/2022  0.20      Folate 05/11/2022  4.5 [4.0 - 24.0 ng/mL]      Thiamine 07/20/2022  44 [38 - 122 ug/L]      Vitamin B-12 05/11/2022  307 [180 - 914 ng/L]      Vitamin E 2022, Jul-20    1610 [500 - 1800 ug/dL]      Cerebrospinal Fluid Assessment   IgG 07/20/2022  1189 [650 - 1600 mg/dL]      Neuroendocrine/Electrolyte Screening   Magnesium 05/04/2022  2.1 [1.6 - 2.6 mg/dL]      BUN 2023, Mar-22    15 [8 - 23 mg/dL]      Chloride 2023, Mar-22    110 [95 -  110 mmol/L]      Creatinine 2023, Mar-22    0.9 [0.5 - 1.4 mg/dL]      Potassium 2023, Mar-22    3.5 [3.5 - 5.1 mmol/L]      Sodium 2023, Mar-22    145 [136 - 145 mmol/L]      Infectious Disease/Immunocompromised Screening   SARS-CoV-2 RNA, Amplification, Qual 2022, May-04    Negative      Hepatitis C Ab 07/20/2022  Negative      HIV 1/2 Ag/Ab 07/20/2022  Negative      Syphilis Treponemal Ab 2022, Jul-20    Nonreactive [Nonreactive]      Standard Hematology Screen   Hematocrit 2022, Jul-20    38.3 [37.0 - 48.5 %]      Hemoglobin 2022, Jul-20    11.9 (L) [12.0 - 16.0 g/dL]      MCV 2022, Jul-20    92 [82 - 98 fL]      Platelets 2022, Jul-20    285 [150 - 450 K/uL]      Toxin/Heavy Metal Screening   Amphetamine Screen, Ur 2022, May-04    Negative      Barbiturate Screen, Ur 2022, May-04    Negative      Benzodiazepines 2022, May-04    Negative      Cocaine (Metab.) 2022, May-04    Negative      Marijuana (THC) Metabolite 2022, May-04    Negative      Methadone metabolites 2022, May-04    Negative      Opiate Scrn, Ur 2022, May-04    Negative      Phencyclidine 2022, May-04    Negative      Delirium Screening   Glucose, UA 2022, May-04    Negative      Ketones, UA 2022, May-04    Negative      Leukocytes, UA 2022, May-04    1+ (A)      NITRITE UA 2022, May-04    Negative      Protein, UA 2022, May-04    Negative      RBC, UA 2022, May-04    2 [0 - 4 /hpf]      WBC, UA 2022, May-04    1 [0 - 5 /hpf]           Neuroimaging:    MRI brain/head without contrast on 5/4/2022  Formal interpretation by Radiology:  Please note image quality is mildly degraded by patient motion artifact, most notably postcontrast images. There is no abnormal restricted diffusion to suggest acute infarction. There is generalized cerebral volume loss with compensatory prominence of the extra-axial spaces, cerebral sulci and ventricular system. The ventricles demonstrate no evidence of hydrocephalus or midline shift. Minimal periventricular T2/FLAIR  hyperintensity which is nonspecific but likely reflect sequela of chronic microvascular ischemic change. There are no abnormal areas of gradient susceptibility to suggest parenchymal hemorrhage. No extra-axial hemorrhage or fluid collections. Following the administration of IV contrast, allowing for motion artifact, there are no definite pathologic foci of enhancement. The craniocervical junction and sellar regions are within normal limits.  Independently reviewed radiological imaging by Jaison Jackson MD. MPH. Behavioral Neurologist  T1: Mild-to-moderate frontal temporal lobar degeneration with regional atrophy most well appreciated in the anterior cortices dorsal greater than ventral with thinning of the anterior cingulate sulci, atrophy of the medial prefrontal dorsal lateral prefrontal relatively mild atrophy of the orbital frontal cortex. Most atrophy appears to be the dorsal midline greater than lateral prefrontal cortex. In addition there is right greater than left anterior temporal atrophy with relative sparing of hippocampi however still mild bilateral hippocampal it atrophy. No significant posterior cortical atrophy. Generalized thinning of the anterior cingulate with sparing of the mid brain and pontine region.  T2/FLAIR: No Significant hyperintensities appreciated on MRI T2/FLAIR  DWI/ADC: No Significant DWI hyperintensities/hypointensities. No ADC correlation.  SWI/GRE: No Significant hypointensities to suggest cortical/subcortical hemosiderin deposition.  Impression: : Bilateral prefrontal cortical atrophy and right greater than left anterior temporal atrophy consistent with frontal temporal lobe degeneration. Sparing of the brainstem. No significant subcortical white matter disease. No significant posterior cortical atrophy.     Procedures:    Electrocardiogram on 5/4/2022  Formal interpretation:  Vent. Rate : 071 BPM     Atrial Rate : 071 BPM    P-R Int : 166 ms          QRS Dur : 068 ms     QT  Int : 384 ms       P-R-T Axes : 068 011 045 degrees    QTc Int : 417 ms Normal sinus rhythm Low voltage QRS Abnormal ECG  Independently reviewed Electrocardiogram by Jaison Jackson MD. MPH. Behavioral Neurologist  Impression: : Received ECG has no evidence of sinus node disease. HR (>=50-60). Prolonged VA interval (>0.22 s). Broad QRS complex (> 0.12 s).     Clinical Summary:     The patient is a 78-year-old right-handed female with a relevant past medical history of HTN with encephalopathy, fibromyalgia, who presents reporting a 3-year history of gradually progressive neurocognitive impairment.       The clinical history is suggestive of:  Memory Impairment: STM encoding impairment, LTM encoding-retrieval impairment, Amnesia of fixation  Attention Impairment: Attention, Selective attention, Sustained attention, Shifting attention  Executive Impairment: Energization, Working Memory, Set-Shifting, Response Inhibition  Language Impairment: Language Dysfunction, Grammar Dysfunction, Receptive Dysfunction  Visuospatial Impairment: Allocentric Spatial Processing  Motor/Coordination Impairment: Sensory motor integration, Central pattern generators dysfunction  Behavior Impairment: Emotional Regulation, Self-Preservation Dysregulation, Social Coherence, Sensorimotor Dysregulation  Psychiatric Impairment: Stimulation Dysregulation, Paranoia  iADL Impairment: [place] Instrumental Activities of Daily Living Scale  The neurological examination is significant for:  Cortical Frontal Dysfunction: non-fluent aphasia (fluency), agrammatic aphasia (syntax comprehension, substitutions)  Cortical Frontal-Parietal Disconnection: apraxia (limb-motor)  Cortical Temporal Dysfunction: anomic aphasia (spontaneous, confrontational), semantic aphasia (superordinate errors)  Cortical Temporal-Parietal Dysfunction: dysgraphia  Cortical Transcallosal Disconnection: interhemispheric motor control (interhemispheric motor control ), motor  efference (motor overflow)  Executive Impairment: thought disorder, judgment, dysexecutive behavior (perseverative/stereotyped, stimulus-bound)  Movement Disorder (Hyperkinetic): tremor (postural)  Movement Disorder (Hypokinetic): paratonia (tone), parkinsonism (bradykinesia)  Movement Disorder (Speech): abnormal vocal features (volume, rate)  The neurocognitive battery is significant (based on age and education) for:  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 Sparing of the brainstem. No significant subcortical white matter disease. No significant posterior cortical atrophy.        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     The patient's clinical presentation meets the criteria for Dementia (McKhann, et al. 2011 Alzheimer's & Dementia).     Concern regarding an intraindividual change in cognition diagnosed through a combination of history and objective cognitive assessment  Impairment in two or more cognitive domains  Interference with independence in functional abilities.  Represents a decline from previous levels of functioning.  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  Early perseverative, stereotyped, or compulsive/ritualistic  behavior: simple repetitive movements, complex, compulsive or ritualistic behaviors, stereotypy of speech.  Frontal and/or anterior temporal atrophy on MRI or CT  Impaired confrontation naming.  Impaired single-word comprehension.  Impaired object knowledge, particularly for low-frequency or low-familiarity items.  Surface dyslexia or dysgraphia.  Spared repetition.  Spared speech production (grammar and motor speech).  Predominant anterior temporal lobe atrophy.     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 There is however reported symptoms of possible hallucinations. Patients  has never seen her respond to sounds/sights that were no there as such it is unclear whether 'hallucinations' are actually an inability to articulate herself or actual sensory hallucinations.   At present, all neurodegenerative diseases can only be diagnosed with 100% certainty through a brain autopsy. The suspected neuropathology underlying the patient's neurocognitive impairment is likely a mixture of pathologies (Alzheimer's Disease Related Pathology, TAR DNA-binding protein 43).  There are no plasma protein biomarkers available on record.  There are no CSF protein biomarkers available on record.  There are no dermatological protein biomarkers available on record.  There is no known relevant genetic testing available.        The observations made above, were discussed with the patient and their supporting historian(s) (). We have discussed the additional diagnostic(s) and/or managenent below.     Care Management Plan:    #Diagnostic Screening for known genetic causes of neurodegeneration.  We have discussed opportunities for genetic testing ([facility], [facility]).  #Optimize Neurocognitive Impairment and Quality  We have discussed the MIND Diet and other lifestyle behavior that may help  maintain brain health.  We have provided written/digital reading material  Continue b12 5000 mcg weekly  We discussed diagnosed with bvFTD - We discussed the patient would likely not be were candidate for disease modifying therapy for Alzheimers. We discussed additional diagnostic testing that would confirm Alzheimers. Patient is not interested in a lumbar puncture. Patient is not interested in blood work. Follow up as needed  #Optimize Behavioral Management and Quality.  No indication for memantine at this time  Continue gabapentin 300 mg TID  #Optimize Cerebrovascular Health.  The patient has a documented history of hyperlipidemia and/or hypercholesteremia with long-term complications such as cerebrovascular disease, peripheral vascular disease, and/or aortic atherosclerosis. Collectively these risk factors may contribute to cerebral atherosclerosis, and cerebral hypoperfusion compounded neurocognitive disorder. We discussed maximizing cerebrovascular-related medical therapy, including but not limited to cholesterol medications and antiplatelet agents. We have discussed the value of aggressively controlling vascular risk factors like hypertension, hyperlipidemia, and Diabetes SBP<130, LDL<100, and A1C<7.0. We discussed the need to optimize lifestyle choices, including a heart-healthy diet (e.g., Mediterranean or DASH), increased cardiovascular exercise (goal 150 minutes of moderate-intensity per week), and staying cognitively and socially active.  #Optimize Fibromyalgia Disorder and Quality.  Start gabapentin 300 mg TID  Refill meloxicam  f/u rheumatology  Restart clonazepam 0.5 mg p.r.n. for leg cramps  Restart vitamin B6 100 mg he takes in 300 mg twice a day for 1 month then 200 mg twice a day for 1 month then 100 mg going forward  #Behavioral/Environmental Strategies  We recommend engaging in activities that stimulate cognitively and socially while avoiding excessive stimulation and fatigue in overwhelmingly  "complex situations.  We recommend integrating routine and schedule into your daily life. https://www.alzheimersproject.org/news/the-importance-of-routine-and-familiarity-to-persons-with-dementia/  #Health Maintenance/Lifestyle Advice  We have discussed the value in aggressively controlling vascular risk factors like hypertension, hyperlipidemia, and Diabetes SBP<130, LDL<100, A1C<7.0.  We discussed the need to optimize lifestyle choices including a heart-healthy diet (e.g., Mediterranean or DASH), increased cardiovascular exercise (goal 150 minutes of moderate-intensity per week), and stay cognitively and socially active.  #Support  We all need support sometimes. Get easy access to local resources, community programs, and services. https://www.communityresourcefinder.org/  Learn more about Cognitive Impairment in [place]: https://www.alz.org/professionals/public-health/state-overview/[place]  #Safety  [place] has no laws against driving with dementia specifically but obviously has laws about medical conditions which impact a person's ability to drive safely. If you believe your loved one's driving capacity has diminished, please reach out to either your primary care physician or our office to discuss driving restrictions or revocation of their license. To learn more: https://www.dementiacarecentral.com/caregiverinfo/driving-problems/  The Alzheimer's Association administers the nationwide "Safe Return" program with identification bracelets, necklaces, or clothing tags and 24-hour assistance. More information is available online at https://www.alz.org/help-support/caregiving/safety/medicalert-with-24-7-wandering-support  #Follow up:  Follow-up as needed.    Thank you for allowing us to participate in the care of your patient. Please do not hesitate to contact us with any questions or concerns.     It was a pleasure seeing The patient and we look forward to seeing them at their follow-up visit.     This note is " dictated on M*Modal Fluency Direct word recognition program. There are word recognition mistakes that are occasionally missed on review.         Scheduled Follow-up :  Future Appointments   Date Time Provider Department Center   7/24/2023  9:30 AM Duarte Perez OD [facility]   10/30/2023  3:00 PM Adelfo White MD [facility]       After Visit Medication List :     Medication List            Accurate as of July 11, 2023  9:36 AM. If you have any questions, ask your nurse or doctor.                CONTINUE taking these medications      clonazePAM 0.5 MG disintegrating tablet  Commonly known as: KlonoPIN  Take 1 tablet (0.5 mg total) by mouth daily as needed (cramping pain).     cyanocobalamin (vitamin B-12) 5,000 mcg Subl  Place one under tongue once a day for 1 month, then continue to take under tongue per week     ezetimibe 10 mg tablet  Commonly known as: ZETIA     gabapentin 300 MG capsule  Commonly known as: NEURONTIN  Take 1 capsule (300 mg total) by mouth 3 (three) times daily.     guaiFENesin 100 mg/5 ml 100 mg/5 mL syrup  Commonly known as: ROBITUSSIN     magnesium oxide 400 mg (241.3 mg magnesium) tablet  Commonly known as: MAG-OX     meloxicam 7.5 MG tablet  Commonly known as: MOBIC  Take 1 tablet (7.5 mg total) by mouth daily as needed for Pain.     NIFEdipine 60 MG (OSM) 24 hr tablet  Commonly known as: PROCARDIA-XL  Take 1 tablet (60 mg total) by mouth once daily.     pyridoxine (vitamin B6) 100 MG Tab  Commonly known as: B-6  Take 3 tablets twice a day for 1 month, then 2 tablets twice a day for 1 month, then continue 1 tablet twice a day     QUEtiapine 25 MG Tab  Commonly known as: SEROQUEL  Take 1/2 tablet by mouth every evening     tiZANidine 2 MG tablet  Commonly known as: ZANAFLEX  Take 0.5-1 tablets (1-2 mg total) by mouth every 8 (eight) hours as needed (back pain).     zolpidem 5 MG Tab  Commonly known as: AMBIEN  Take 1 tablet (5 mg total) by mouth every evening.               Signing Physician:  Jaison Javier MD    Billing:        -----------------------------------------------------------------------------    I performed this consultation using real-time Telehealth tools, including a live video connection between my location and the patient's location (their home within the [facility]). Prior to initiating the consultation, I obtained informed verbal consent to perform this consultation using Telehealth tools and answered all the questions about the Telehealth interaction. The participants understand that only a limited neurological exam and limited neuropsychological testing can be performed using Telehealth tools.    I spent a total of 75 minutes (time-in: 09:15 AM; time-out: 10:30 AM) on 2023-07-11, virtually face-to-face with the patient and caregiver(s), >50% of that time was spent counseling regarding the symptoms, treatment plan, risks, therapeutic options, lifestyle modifications, and/or safety issues for the diagnoses above.    10/14 Review of Systems completed and is negative except as stated above in HPI (Systems reviewed: Const, Eyes, ENT, Resp, CV, GI, , MSK, Skin, Neuro)    I reviewed the summation of records from outside physicians for a total of 5 minutes on 2023-07-11. Reviewed and summation of records from an outside physician was performed as summarized above in HPI    I performed a neurobehavioral status examination that included a clinical assessment of thinking, reasoning, and judgment. Please see above HPI and ROS for full details. This exam was performed on 2023-07-11 and included 11 minutes spent on direct face-to-face clinical observation and interview with the patient and 24 minutes spent interpreting test results and preparing the report. The total time of 35 minutes spent on the neurobehavioral status examination is not included in the time spent on evaluation and management coding.    Total Billing time spent on encounter/documentation  for this patient's evaluation and management, not including the neurobehavioral status examination: 69 minutes.

## 2023-07-19 ENCOUNTER — PATIENT MESSAGE (OUTPATIENT)
Dept: RHEUMATOLOGY | Facility: CLINIC | Age: 78
End: 2023-07-19
Payer: MEDICARE

## 2023-07-24 ENCOUNTER — OFFICE VISIT (OUTPATIENT)
Dept: OPTOMETRY | Facility: CLINIC | Age: 78
End: 2023-07-24
Payer: MEDICARE

## 2023-07-24 DIAGNOSIS — H52.4 PRESBYOPIA: ICD-10-CM

## 2023-07-24 DIAGNOSIS — Z13.5 GLAUCOMA SCREENING: ICD-10-CM

## 2023-07-24 DIAGNOSIS — H25.13 NUCLEAR SCLEROSIS, BILATERAL: Primary | ICD-10-CM

## 2023-07-24 PROCEDURE — 99999 PR PBB SHADOW E&M-EST. PATIENT-LVL III: ICD-10-PCS | Mod: PBBFAC,,, | Performed by: OPTOMETRIST

## 2023-07-24 PROCEDURE — 92015 PR REFRACTION: ICD-10-PCS | Mod: ,,, | Performed by: OPTOMETRIST

## 2023-07-24 PROCEDURE — 92015 DETERMINE REFRACTIVE STATE: CPT | Mod: ,,, | Performed by: OPTOMETRIST

## 2023-07-24 PROCEDURE — 92014 COMPRE OPH EXAM EST PT 1/>: CPT | Mod: S$PBB,,, | Performed by: OPTOMETRIST

## 2023-07-24 PROCEDURE — 92014 PR EYE EXAM, EST PATIENT,COMPREHESV: ICD-10-PCS | Mod: S$PBB,,, | Performed by: OPTOMETRIST

## 2023-07-24 PROCEDURE — 99999 PR PBB SHADOW E&M-EST. PATIENT-LVL III: CPT | Mod: PBBFAC,,, | Performed by: OPTOMETRIST

## 2023-07-24 PROCEDURE — 99213 OFFICE O/P EST LOW 20 MIN: CPT | Mod: PBBFAC | Performed by: OPTOMETRIST

## 2023-07-24 NOTE — PROGRESS NOTES
HPI    77 Y/o female is here for routine eye exam with no C/o about ocular health      Pt denies pain and discomfort   No f/f    Eye med: no gtt   Last edited by Fabiola Almaraz MA on 7/24/2023  9:30 AM.            Assessment /Plan     For exam results, see Encounter Report.    Nuclear sclerosis, bilateral    Presbyopia    Glaucoma screening         present for exam    1. Cat OU--wrote optional new Rx  2. MELISSA--advised SOOTHE XP ATs prn    PLAN:    rtc 1 yr

## 2023-07-28 ENCOUNTER — OFFICE VISIT (OUTPATIENT)
Dept: PAIN MEDICINE | Facility: CLINIC | Age: 78
End: 2023-07-28
Payer: MEDICARE

## 2023-07-28 VITALS
HEART RATE: 70 BPM | BODY MASS INDEX: 23.82 KG/M2 | WEIGHT: 139.56 LBS | DIASTOLIC BLOOD PRESSURE: 73 MMHG | HEIGHT: 64 IN | OXYGEN SATURATION: 99 % | SYSTOLIC BLOOD PRESSURE: 147 MMHG

## 2023-07-28 DIAGNOSIS — G89.29 OTHER CHRONIC PAIN: ICD-10-CM

## 2023-07-28 DIAGNOSIS — M51.36 DDD (DEGENERATIVE DISC DISEASE), LUMBAR: Primary | ICD-10-CM

## 2023-07-28 DIAGNOSIS — M54.9 DORSALGIA, UNSPECIFIED: ICD-10-CM

## 2023-07-28 DIAGNOSIS — M54.16 LUMBAR RADICULOPATHY: ICD-10-CM

## 2023-07-28 DIAGNOSIS — M47.816 LUMBAR SPONDYLOSIS: ICD-10-CM

## 2023-07-28 PROCEDURE — 99999 PR PBB SHADOW E&M-EST. PATIENT-LVL IV: CPT | Mod: PBBFAC,,,

## 2023-07-28 PROCEDURE — 99214 OFFICE O/P EST MOD 30 MIN: CPT | Mod: S$PBB,,,

## 2023-07-28 PROCEDURE — 99214 OFFICE O/P EST MOD 30 MIN: CPT | Mod: PBBFAC,PN

## 2023-07-28 PROCEDURE — 99214 PR OFFICE/OUTPT VISIT, EST, LEVL IV, 30-39 MIN: ICD-10-PCS | Mod: S$PBB,,,

## 2023-07-28 PROCEDURE — 99999 PR PBB SHADOW E&M-EST. PATIENT-LVL IV: ICD-10-PCS | Mod: PBBFAC,,,

## 2023-07-28 NOTE — PROGRESS NOTES
Subjective:     Patient ID: Mamta Bates is a 78 y.o. female    Chief Complaint: Low-back Pain and Leg Pain (Patient states that she is feeling tingling in her legs.)        Referred by: No ref. provider found      HPI:    Interval History PA (07/28/2023):  Patient returns to clinic for follow up of chronic bilateral lower back and lower extremity pain.  Patient reporting various intermittent pains throughout her body.  She does have a history of fibromyalgia and has followed up with Rheumatology.  Rheumatology not find any inflammatory arthritis.  Reporting that the worst of her pain is located across her bilateral lower lumbar paraspinal region.  Reports pain is constant, achy.  Notes worsens with certain activities and throughout the day.  Patient also noting pain occasionally radiating from her back into her bilateral thighs and into her calves.  Noting significant calf pain, associated with occasional cramps and muscle tightness.  Denies any weakness, numbness, tingling, bowel or bladder dysfunction.  Patient has tried various medications including tramadol, Cymbalta, Lyrica, gabapentin without significant benefit.  Currently taking Tylenol p.r.n., BC powder, gabapentin and tizanidine p.r.n..  Reports some benefit with these medications, denies any adverse effects.    Initial Encounter (9/20/22):  Mamta Bates is a 78 y.o. female who presents today with chronic low back and lower extremity pain.  This pain has been present for years.  Patient states that she has been diagnosed with fibromyalgia in the past though this diagnosis has been disputed.  Pain is intermittent.  States that the pain can get very severe at times.  During these times it is difficult for her to be active at all.  Pain is located across the lower lumbar region.  The pain will radiate to lower extremities to her calves.  She reports having associated cramps of the lower extremities.  She denies any associated numbness, tingling, weakness,  bowel bladder dysfunction.   This pain is described in detail below.    Physical Therapy:  Yes.     Non-pharmacologic Treatment:  Rest helps         TENS?  No    Pain Medications:         Currently taking:  Meloxicam, tizanidine, gabapentin, BC powder    Has tried in the past:  NSAIDs, Tylenol, tramadol, Cymbalta, Lyrica    Has not tried:  TCAs, SNRIs, topical creams    Blood thinners:  None    Interventional Therapies:  None    Relevant Surgeries:  None    Affecting sleep?  Yes    Affecting daily activities? yes    Depressive symptoms? no          SI/HI? No    Work status: Retired    Pain Scores:    Best:       6/10  Worst:     9/10  Usually:   6/10  Today:    8/10         Review of Systems   Constitutional:  Negative for activity change, appetite change, chills, fatigue, fever and unexpected weight change.   HENT:  Negative for hearing loss.    Eyes:  Negative for visual disturbance.   Respiratory:  Negative for chest tightness and shortness of breath.    Cardiovascular:  Negative for chest pain.   Gastrointestinal:  Negative for abdominal pain, constipation, diarrhea, nausea and vomiting.   Genitourinary:  Negative for difficulty urinating.   Musculoskeletal:  Positive for back pain, gait problem and myalgias. Negative for arthralgias, neck pain and neck stiffness.   Skin:  Negative for rash.   Neurological:  Negative for dizziness, weakness, light-headedness, numbness and headaches.   Psychiatric/Behavioral:  Positive for sleep disturbance. Negative for hallucinations and suicidal ideas. The patient is not nervous/anxious.      Past Medical History:   Diagnosis Date    Fibromyalgia     Hypertension        Past Surgical History:   Procedure Laterality Date    HYSTERECTOMY         Social History     Socioeconomic History    Marital status:    Tobacco Use    Smoking status: Never    Smokeless tobacco: Never   Substance and Sexual Activity    Alcohol use: No    Drug use: No    Sexual activity: Yes      "Partners: Male       Review of patient's allergies indicates:  No Known Allergies    Current Outpatient Medications on File Prior to Visit   Medication Sig Dispense Refill    clonazePAM (KLONOPIN) 0.5 MG disintegrating tablet Take 1 tablet (0.5 mg total) by mouth daily as needed (cramping pain). 10 tablet 0    cyanocobalamin, vitamin B-12, 5,000 mcg Subl Place one under tongue once a day for 1 month, then continue to take under tongue per week 30 tablet 3    ezetimibe (ZETIA) 10 mg tablet Take 10 mg by mouth once daily.      gabapentin (NEURONTIN) 300 MG capsule Take 1 capsule (300 mg total) by mouth 3 (three) times daily. 90 capsule 2    guaiFENesin 100 mg/5 ml (ROBITUSSIN) 100 mg/5 mL syrup Take 200 mg by mouth 3 (three) times daily as needed for Cough.      magnesium oxide (MAG-OX) 400 mg (241.3 mg magnesium) tablet Take 400 mg by mouth once daily.      meloxicam (MOBIC) 7.5 MG tablet Take 1 tablet (7.5 mg total) by mouth daily as needed for Pain. 30 tablet 2    NIFEdipine (PROCARDIA-XL) 60 MG (OSM) 24 hr tablet Take 1 tablet (60 mg total) by mouth once daily. 90 tablet 1    pyridoxine, vitamin B6, (B-6) 100 MG Tab Take 3 tablets twice a day for 1 month, then 2 tablets twice a day for 1 month, then continue 1 tablet twice a day 180 tablet 1    QUEtiapine (SEROQUEL) 25 MG Tab Take 1/2 tablet by mouth every evening 90 tablet 1    tiZANidine (ZANAFLEX) 2 MG tablet Take 0.5-1 tablets (1-2 mg total) by mouth every 8 (eight) hours as needed (back pain). 90 tablet 2     No current facility-administered medications on file prior to visit.       Objective:      BP (!) 147/73 (BP Location: Left arm, Patient Position: Sitting, BP Method: Medium (Automatic))   Pulse 70   Ht 5' 4" (1.626 m)   Wt 63.3 kg (139 lb 8.8 oz)   SpO2 99%   BMI 23.95 kg/m²     Exam:  GEN:  Well developed, well nourished.  No acute distress.  Normal pain behavior.  HEENT:  No trauma.  Mucous membranes moist.  Nares patent bilaterally.  PSYCH: " Normal affect. Thought content appropriate.  CHEST:  Breathing symmetric.  No audible wheezing.  ABD: Soft, non-distended.  SKIN:  Warm, pink, dry.  No rash on exposed areas.    EXT:  No cyanosis, clubbing, or edema.  No color change or changes in nail or hair growth.  NEURO/MUSCULOSKELETAL:  Fully alert, oriented, and appropriate. Speech normal zuri. No cranial nerve deficits.   Gait:  Normal.  No trendelenburg sign bilaterally.   Motor Strength:  5/5 motor strength throughout lower extremities.   Sensory:  No sensory deficit in the lower extremities.   Reflexes:  1 + and symmetric throughout.  Downgoing Babinski's bilaterally.  No clonus or spasticity.  L-Spine:  Full ROM without pain.  No pain with axial/facet loading bilaterally.  Negative SLR bilaterally.    No TTP over lumbar paraspinals, bilateral SI joints, hips, piriformis muscles, or GTB.        Imaging:    Narrative & Impression    EXAMINATION:  XR LUMBAR SPINE AP AND LATERAL     CLINICAL HISTORY:  Other chronic pain     TECHNIQUE:  AP, lateral and spot images were performed of the lumbar spine.     COMPARISON:  None     FINDINGS:  20 degree or so lumbar dextrocurvature.  No acute fractures visualized lower thoracic or lumbar vertebral segments, preserved vertebral body heights and pedicles.  Five non rib-bearing lumbar vertebral segments.  No spondylolysis.  Grade 1 anterolisthesis L3 on L4 and L4 on L5..  Moderate to severe disc narrowing L3-L4 level with opposing endplate sclerosis and vacuum phenomenon.  Moderate to severe disc narrowing L4-L5 level.  Moderate to severe disc narrowing and vacuum phenomenon L5-S1 level.  Multilevel lumbar facet arthropathic changes greatest about the lower 3 levels.  Some abdominal aortic wall vascular calcification.  Intact right and left SI joints and visualized hip joint spaces.     Impression:     As above.        Electronically signed by: Octavio Cornelius  Date:                                             08/22/2022  Time:                                           11:22       Assessment:       Encounter Diagnoses   Name Primary?    DDD (degenerative disc disease), lumbar Yes    Dorsalgia, unspecified     Lumbar spondylosis     Lumbar radiculopathy     Other chronic pain            Plan:       Mamta was seen today for low-back pain and leg pain.    Diagnoses and all orders for this visit:    DDD (degenerative disc disease), lumbar  -     MRI Lumbar Spine Without Contrast; Future    Dorsalgia, unspecified  -     MRI Lumbar Spine Without Contrast; Future    Lumbar spondylosis    Lumbar radiculopathy    Other chronic pain          Mamta Bates is a 78 y.o. female with chronic bilateral lower back and lower extremity pain.  Symptoms somewhat nonspecific and exact etiology of pain is unclear at this time.  Patient also is somewhat poor historian due to dementia, patient's  was able to assist with the history.  No significant findings noted on exam.  Subjectively having symptoms consistent with lumbar facet mediated pain but can not rule out radicular pain though no overt neurological deficits noted on examination.  Also quality/distribution of lower extremity pain not very consistent with radiculopathy.    Prior records reviewed.  Pertinent imaging studies reviewed by me. Imaging results were discussed with patient.  Ordered lumbar MRI to further evaluate for lumbar pathology.  Return to clinic after imaging to review results.  May consider interventional procedures at that time if appropriate.      Adam Leahy PA-C  [facility]  Interventional Pain Management   07/28/2023    The total time spent for evaluation and management on 07/28/2023 including reviewing separately obtained history, performing a medically appropriate exam and evaluation, documenting clinical information in the health record, independently interpreting results and communicating them to the patient/family/caregiver,  and ordering medications/tests/procedures was between 30-39 minutes.     This note was created by combination of typed  and M-Modal dictation.  Transcription and phonetic errors may be present.  If there are any questions, please contact me.

## 2023-08-01 RX ORDER — GABAPENTIN 300 MG/1
300 CAPSULE ORAL 3 TIMES DAILY
Qty: 90 CAPSULE | Refills: 2 | Status: SHIPPED | OUTPATIENT
Start: 2023-08-01 | End: 2024-03-07 | Stop reason: SDUPTHER

## 2023-08-09 RX ORDER — QUETIAPINE FUMARATE 25 MG/1
TABLET, FILM COATED ORAL
Qty: 90 TABLET | Refills: 3 | Status: SHIPPED | OUTPATIENT
Start: 2023-08-09 | End: 2023-12-15 | Stop reason: SDUPTHER

## 2023-08-15 ENCOUNTER — OFFICE VISIT (OUTPATIENT)
Dept: PAIN MEDICINE | Facility: CLINIC | Age: 78
End: 2023-08-15
Payer: MEDICARE

## 2023-08-15 VITALS
HEIGHT: 64 IN | SYSTOLIC BLOOD PRESSURE: 121 MMHG | HEART RATE: 77 BPM | BODY MASS INDEX: 24.05 KG/M2 | DIASTOLIC BLOOD PRESSURE: 84 MMHG | WEIGHT: 140.88 LBS

## 2023-08-15 DIAGNOSIS — G89.29 OTHER CHRONIC PAIN: ICD-10-CM

## 2023-08-15 DIAGNOSIS — M54.16 LUMBAR RADICULOPATHY: ICD-10-CM

## 2023-08-15 DIAGNOSIS — M47.816 LUMBAR SPONDYLOSIS: ICD-10-CM

## 2023-08-15 DIAGNOSIS — M51.36 DDD (DEGENERATIVE DISC DISEASE), LUMBAR: Primary | ICD-10-CM

## 2023-08-15 DIAGNOSIS — M54.9 DORSALGIA, UNSPECIFIED: ICD-10-CM

## 2023-08-15 PROCEDURE — 99213 OFFICE O/P EST LOW 20 MIN: CPT | Mod: PBBFAC,PN

## 2023-08-15 PROCEDURE — 99999 PR PBB SHADOW E&M-EST. PATIENT-LVL III: ICD-10-PCS | Mod: PBBFAC,,,

## 2023-08-15 PROCEDURE — 99214 PR OFFICE/OUTPT VISIT, EST, LEVL IV, 30-39 MIN: ICD-10-PCS | Mod: S$PBB,,,

## 2023-08-15 PROCEDURE — 99999 PR PBB SHADOW E&M-EST. PATIENT-LVL III: CPT | Mod: PBBFAC,,,

## 2023-08-15 PROCEDURE — 99214 OFFICE O/P EST MOD 30 MIN: CPT | Mod: S$PBB,,,

## 2023-08-15 NOTE — PROGRESS NOTES
Subjective:     Patient ID: Mamta Bates is a 78 y.o. female    Chief Complaint: No chief complaint on file.        Referred by: No ref. provider found      HPI:    Interval History PA (08/15/2023):  Patient returns to clinic for follow up and MRI review.  Patient denies any changes in the quality or location of her pain since previous visit.  She continues to endorse bilateral lower back pain with occasional radiation into her bilateral lower extremities.  Patient does have a history of fibromyalgia and is reporting diffuse pain patterns throughout her neck, upper back, lower back and extremities.  Majority of pain appears to be located in her lower back as well as noting significant pain in her bilateral calves.  Notes associated muscle spasms and sharp pain that occurs intermittently in her calves.  Notes pain is constant throughout the day, worsened with certain activities.  Notes having to take frequent breaks to alleviate the pain.  Denies any weakness, numbness, tingling, bowel or bladder dysfunction.  She continues to take Tylenol, BC powder, gabapentin and tizanidine p.r.n. with benefit, denies any adverse effects from these medications.    Interval History PA (07/28/2023):  Patient returns to clinic for follow up of chronic bilateral lower back and lower extremity pain.  Patient reporting various intermittent pains throughout her body.  She does have a history of fibromyalgia and has followed up with Rheumatology.  Rheumatology not find any inflammatory arthritis.  Reporting that the worst of her pain is located across her bilateral lower lumbar paraspinal region.  Reports pain is constant, achy.  Notes worsens with certain activities and throughout the day.  Patient also noting pain occasionally radiating from her back into her bilateral thighs and into her calves.  Noting significant calf pain, associated with occasional cramps and muscle tightness.  Denies any weakness, numbness, tingling, bowel or  bladder dysfunction.  Patient has tried various medications including tramadol, Cymbalta, Lyrica, gabapentin without significant benefit.  Currently taking Tylenol p.r.n., BC powder, gabapentin and tizanidine p.r.n..  Reports some benefit with these medications, denies any adverse effects.    Initial Encounter (9/20/22):  Mamta Bates is a 78 y.o. female who presents today with chronic low back and lower extremity pain.  This pain has been present for years.  Patient states that she has been diagnosed with fibromyalgia in the past though this diagnosis has been disputed.  Pain is intermittent.  States that the pain can get very severe at times.  During these times it is difficult for her to be active at all.  Pain is located across the lower lumbar region.  The pain will radiate to lower extremities to her calves.  She reports having associated cramps of the lower extremities.  She denies any associated numbness, tingling, weakness, bowel bladder dysfunction.   This pain is described in detail below.    Physical Therapy:  Yes.     Non-pharmacologic Treatment:  Rest helps         TENS?  No    Pain Medications:         Currently taking:  Meloxicam, tizanidine, gabapentin, BC powder    Has tried in the past:  NSAIDs, Tylenol, tramadol, Cymbalta, Lyrica    Has not tried:  TCAs, SNRIs, topical creams    Blood thinners:  None    Interventional Therapies:  None    Relevant Surgeries:  None    Affecting sleep?  Yes    Affecting daily activities? yes    Depressive symptoms? no          SI/HI? No    Work status: Retired    Pain Scores:    Best:       6/10  Worst:     9/10  Usually:   6/10  Today:    8/10    Pain Disability Index  Family/Home Responsibilities:: 8  Recreation:: 8  Social Activity:: 5  Occupation:: 0  Sexual Behavior:: 0  Self Care:: 5  Life-Support Activities:: 1  Pain Disability Index (PDI): 27    Review of Systems   Constitutional:  Negative for activity change, appetite change, chills, fatigue, fever and  unexpected weight change.   HENT:  Negative for hearing loss.    Eyes:  Negative for visual disturbance.   Respiratory:  Negative for chest tightness and shortness of breath.    Cardiovascular:  Negative for chest pain.   Gastrointestinal:  Negative for abdominal pain, constipation, diarrhea, nausea and vomiting.   Genitourinary:  Negative for difficulty urinating.   Musculoskeletal:  Positive for back pain, gait problem and myalgias. Negative for arthralgias, neck pain and neck stiffness.   Skin:  Negative for rash.   Neurological:  Negative for dizziness, weakness, light-headedness, numbness and headaches.   Psychiatric/Behavioral:  Positive for sleep disturbance. Negative for hallucinations and suicidal ideas. The patient is not nervous/anxious.        Past Medical History:   Diagnosis Date    Fibromyalgia     Hypertension        Past Surgical History:   Procedure Laterality Date    HYSTERECTOMY         Social History     Socioeconomic History    Marital status:    Tobacco Use    Smoking status: Never    Smokeless tobacco: Never   Substance and Sexual Activity    Alcohol use: No    Drug use: No    Sexual activity: Yes     Partners: Male       Review of patient's allergies indicates:  No Known Allergies    Current Outpatient Medications on File Prior to Visit   Medication Sig Dispense Refill    clonazePAM (KLONOPIN) 0.5 MG disintegrating tablet Take 1 tablet (0.5 mg total) by mouth daily as needed (cramping pain). 10 tablet 0    cyanocobalamin, vitamin B-12, 5,000 mcg Subl Place one under tongue once a day for 1 month, then continue to take under tongue per week 30 tablet 3    ezetimibe (ZETIA) 10 mg tablet Take 10 mg by mouth once daily.      gabapentin (NEURONTIN) 300 MG capsule TAKE 1 CAPSULE(300 MG) BY MOUTH THREE TIMES DAILY 90 capsule 2    guaiFENesin 100 mg/5 ml (ROBITUSSIN) 100 mg/5 mL syrup Take 200 mg by mouth 3 (three) times daily as needed for Cough.      magnesium oxide (MAG-OX) 400 mg (241.3  "mg magnesium) tablet Take 400 mg by mouth once daily.      meloxicam (MOBIC) 7.5 MG tablet Take 1 tablet (7.5 mg total) by mouth daily as needed for Pain. 30 tablet 2    NIFEdipine (PROCARDIA-XL) 60 MG (OSM) 24 hr tablet Take 1 tablet (60 mg total) by mouth once daily. 90 tablet 1    pyridoxine, vitamin B6, (B-6) 100 MG Tab Take 3 tablets twice a day for 1 month, then 2 tablets twice a day for 1 month, then continue 1 tablet twice a day 180 tablet 1    QUEtiapine (SEROQUEL) 25 MG Tab TAKE ONE-HALF (1/2) TABLET EVERY EVENING 90 tablet 3    tiZANidine (ZANAFLEX) 2 MG tablet Take 0.5-1 tablets (1-2 mg total) by mouth every 8 (eight) hours as needed (back pain). 90 tablet 2     No current facility-administered medications on file prior to visit.       Objective:      /84   Pulse 77   Ht 5' 4" (1.626 m)   Wt 63.9 kg (140 lb 14 oz)   BMI 24.18 kg/m²     Exam:  GEN:  Well developed, well nourished.  No acute distress.  Normal pain behavior.  HEENT:  No trauma.  Mucous membranes moist.  Nares patent bilaterally.  PSYCH: Normal affect. Thought content appropriate.  CHEST:  Breathing symmetric.  No audible wheezing.  ABD: Soft, non-distended.  SKIN:  Warm, pink, dry.  No rash on exposed areas.    EXT:  No cyanosis, clubbing, or edema.  No color change or changes in nail or hair growth.  NEURO/MUSCULOSKELETAL:  Fully alert, oriented, and appropriate. Speech normal zuri. No cranial nerve deficits.   Gait:  Normal.  No trendelenburg sign bilaterally.       Imaging:  Narrative & Impression    EXAMINATION:  MRI LUMBAR SPINE WITHOUT CONTRAST     CLINICAL HISTORY:  Low back pain, symptoms persist with > 6wks conservative treatment; Other intervertebral disc degeneration, lumbar region     TECHNIQUE:  Multiplanar, multisequence MR images were acquired from the thoracolumbar junction to the sacrum without the administration of contrast.     COMPARISON:  Radiograph 08/22/2022     FINDINGS:  Alignment: Mild scoliosis convex " LEFT.  Grade 1 anterolisthesis L3 on L4-L4 on L5.     Vertebrae: 5 lumbar-type vertebral bodies. No aggressive marrow replacement process or fracture.Vertebral endplatebone marrow edema consistent with degenerative change L3-L4 L4-L5.     Discs: Disc space narrowing most evident L3-L4 L4-L5 level.     Cord: Normal. Conus terminates at L1.     Degenerative findings:     T12-L1: There is no focal disc herniation. No significant central canal narrowing . No significant neural foraminal narrowing.     L1-L2: There is no focal disc herniation. No significant central canal narrowing . No significant neural foraminal narrowing.     L2-L3: Broad-based disc bulge, facet degenerative change and ligamentum flavum thickening which contribute to  mild central canal narrowing and encroachment upon the LEFT lateral recess.  No significant neural foraminal narrowing.     L3-L4: Broad-based disc bulge, facet degenerative change and ligamentum flavum thickening which contribute to severe central canal narrowing and narrowing of the lateral recesses bilaterally.  Mild RIGHT and severe LEFT neural foraminal narrowing.     L4-L5: Broad-based disc bulge, facet degenerative change and ligamentum flavum thickening which contribute to severe central canal narrowing and narrowing of the lateral recesses bilaterally. Moderate-severe bilateral RIGHT worse than LEFT neural foraminal narrowing.     L5-S1: There is no focal disc herniation. No significant central canal narrowing . Mild bilateral neural foraminal narrowing.     Paraspinal muscles & soft tissues: Unremarkable.     Impression:     Multilevel lumbar spondylosis, worst L3-L4 L4-L5 level with severe central canal narrowing and associated neural foraminal encroachment as detailed above.        Electronically signed by: John Soto MD  Date:                                            08/09/2023  Time:                                           10:50     Narrative &  Impression    EXAMINATION:  XR LUMBAR SPINE AP AND LATERAL     CLINICAL HISTORY:  Other chronic pain     TECHNIQUE:  AP, lateral and spot images were performed of the lumbar spine.     COMPARISON:  None     FINDINGS:  20 degree or so lumbar dextrocurvature.  No acute fractures visualized lower thoracic or lumbar vertebral segments, preserved vertebral body heights and pedicles.  Five non rib-bearing lumbar vertebral segments.  No spondylolysis.  Grade 1 anterolisthesis L3 on L4 and L4 on L5..  Moderate to severe disc narrowing L3-L4 level with opposing endplate sclerosis and vacuum phenomenon.  Moderate to severe disc narrowing L4-L5 level.  Moderate to severe disc narrowing and vacuum phenomenon L5-S1 level.  Multilevel lumbar facet arthropathic changes greatest about the lower 3 levels.  Some abdominal aortic wall vascular calcification.  Intact right and left SI joints and visualized hip joint spaces.     Impression:     As above.        Electronically signed by: Octavio Cornelius  Date:                                            08/22/2022  Time:                                           11:22       Assessment:       Encounter Diagnoses   Name Primary?    DDD (degenerative disc disease), lumbar Yes    Lumbar radiculopathy     Lumbar spondylosis     Dorsalgia, unspecified     Other chronic pain      Plan:       Diagnoses and all orders for this visit:    DDD (degenerative disc disease), lumbar    Lumbar radiculopathy    Lumbar spondylosis    Dorsalgia, unspecified    Other chronic pain      Mamta Bates is a 78 y.o. female with chronic bilateral lower back and lower extremity pain.  Exact etiology of pain is somewhat unclear at this time as her symptoms are somewhat nonspecific, also patient is a poor historian due to dementia.  Symptoms appear to be multifactorial, consistent with both facet arthropathy as well as neurogenic claudication related to spinal stenosis.  Lumbar MRI does show multilevel degenerative  changes most notable at L3-4 and L4-5.  Large broad-based disc bulges at these levels creating severe central canal stenosis as well as moderate to severe bilateral foraminal narrowing.  Anterolisthesis noted at L3 on L4 and L4 on L5.    Prior records reviewed.  Pertinent imaging studies reviewed by me. Imaging results were discussed with patient.  Schedule for caudal epidural steroid injection.  Patient's pain appears to be multifactorial likely related to neurogenic claudication due to significant spinal stenosis noted at L3-4 and L4-5.  Patient also likely having pain related to facet degeneration.  Can consider lumbar MBB/RFA in the future.  Stressed the importance of maintaining regular home exercise program and being mindful of how they use their back throughout the day.  Patient expressed understanding and agreement.  Return to clinic 2 weeks postprocedure to discuss efficacy.        Adam Leahy PA-C  [facility]  Interventional Pain Management   08/15/2023    This note was created by combination of typed  and M-Modal dictation.  Transcription and phonetic errors may be present.  If there are any questions, please contact me.

## 2023-09-12 ENCOUNTER — OFFICE VISIT (OUTPATIENT)
Dept: PAIN MEDICINE | Facility: CLINIC | Age: 78
End: 2023-09-12
Payer: MEDICARE

## 2023-09-12 VITALS
HEIGHT: 64 IN | SYSTOLIC BLOOD PRESSURE: 128 MMHG | WEIGHT: 142 LBS | HEART RATE: 68 BPM | DIASTOLIC BLOOD PRESSURE: 59 MMHG | BODY MASS INDEX: 24.24 KG/M2

## 2023-09-12 DIAGNOSIS — M51.36 DDD (DEGENERATIVE DISC DISEASE), LUMBAR: Primary | ICD-10-CM

## 2023-09-12 DIAGNOSIS — M54.16 LUMBAR RADICULOPATHY: ICD-10-CM

## 2023-09-12 DIAGNOSIS — M47.816 LUMBAR SPONDYLOSIS: ICD-10-CM

## 2023-09-12 DIAGNOSIS — M54.9 DORSALGIA, UNSPECIFIED: ICD-10-CM

## 2023-09-12 PROCEDURE — 99214 OFFICE O/P EST MOD 30 MIN: CPT | Mod: S$PBB,,,

## 2023-09-12 PROCEDURE — 99214 PR OFFICE/OUTPT VISIT, EST, LEVL IV, 30-39 MIN: ICD-10-PCS | Mod: S$PBB,,,

## 2023-09-12 PROCEDURE — 99999 PR PBB SHADOW E&M-EST. PATIENT-LVL III: CPT | Mod: PBBFAC,,,

## 2023-09-12 PROCEDURE — 99999 PR PBB SHADOW E&M-EST. PATIENT-LVL III: ICD-10-PCS | Mod: PBBFAC,,,

## 2023-09-12 PROCEDURE — 99213 OFFICE O/P EST LOW 20 MIN: CPT | Mod: PBBFAC,PN

## 2023-09-12 NOTE — PROGRESS NOTES
Subjective:     Patient ID: Mamta Bates is a 78 y.o. female    Chief Complaint: Back Pain        Referred by: No ref. provider found      HPI:    Interval History PA (09/12/2023):  Patient returns to clinic for follow up.  Patient is s/p caudal epidural steroid injection done on 08/24/2023 with some improvement of her lower back and lower extremity pain although patient having difficulty quantifying relief given history of dementia.  Patient is a poor historian, most of the information provided by patient's .  Patient is noted to have decreased cramping and pain in her bilateral calves only the injection.  Some decreased lower extremity symptoms.  However patient continues to endorse significant pain in her midline lower lumbar spine into her bilateral lumbar paraspinal region.  Also having continued pain radiating into her bilateral lower extremities however radiating pain is less severe since the injection.  Patient does have a history of fibromyalgia with diffuse pain patterns throughout upper, lower back and extremities.  Denies any numbness, tingling, weakness, bowel or bladder dysfunction.  She continues to take Tylenol, BC powder, gabapentin, and tizanidine p.r.n. with benefit, denies any adverse effects from these medications.    Interval History PA (08/15/2023):  Patient returns to clinic for follow up and MRI review.  Patient denies any changes in the quality or location of her pain since previous visit.  She continues to endorse bilateral lower back pain with occasional radiation into her bilateral lower extremities.  Patient does have a history of fibromyalgia and is reporting diffuse pain patterns throughout her neck, upper back, lower back and extremities.  Majority of pain appears to be located in her lower back as well as noting significant pain in her bilateral calves.  Notes associated muscle spasms and sharp pain that occurs intermittently in her calves.  Notes pain is constant throughout  the day, worsened with certain activities.  Notes having to take frequent breaks to alleviate the pain.  Denies any weakness, numbness, tingling, bowel or bladder dysfunction.  She continues to take Tylenol, BC powder, gabapentin and tizanidine p.r.n. with benefit, denies any adverse effects from these medications.    Interval History PA (07/28/2023):  Patient returns to clinic for follow up of chronic bilateral lower back and lower extremity pain.  Patient reporting various intermittent pains throughout her body.  She does have a history of fibromyalgia and has followed up with Rheumatology.  Rheumatology not find any inflammatory arthritis.  Reporting that the worst of her pain is located across her bilateral lower lumbar paraspinal region.  Reports pain is constant, achy.  Notes worsens with certain activities and throughout the day.  Patient also noting pain occasionally radiating from her back into her bilateral thighs and into her calves.  Noting significant calf pain, associated with occasional cramps and muscle tightness.  Denies any weakness, numbness, tingling, bowel or bladder dysfunction.  Patient has tried various medications including tramadol, Cymbalta, Lyrica, gabapentin without significant benefit.  Currently taking Tylenol p.r.n., BC powder, gabapentin and tizanidine p.r.n..  Reports some benefit with these medications, denies any adverse effects.    Initial Encounter (9/20/22):  Mamta Bates is a 78 y.o. female who presents today with chronic low back and lower extremity pain.  This pain has been present for years.  Patient states that she has been diagnosed with fibromyalgia in the past though this diagnosis has been disputed.  Pain is intermittent.  States that the pain can get very severe at times.  During these times it is difficult for her to be active at all.  Pain is located across the lower lumbar region.  The pain will radiate to lower extremities to her calves.  She reports having  associated cramps of the lower extremities.  She denies any associated numbness, tingling, weakness, bowel bladder dysfunction.   This pain is described in detail below.    Physical Therapy:  Yes.     Non-pharmacologic Treatment:  Rest helps         TENS?  No    Pain Medications:         Currently taking:  Meloxicam, tizanidine, gabapentin, BC powder    Has tried in the past:  NSAIDs, Tylenol, tramadol, Cymbalta, Lyrica    Has not tried:  TCAs, SNRIs, topical creams    Blood thinners:  None    Interventional Therapies:    08/24/2023 - caudal epidural steroid injection - mild-moderate relief of lower extremity pain    Relevant Surgeries:  None    Affecting sleep?  Yes    Affecting daily activities? yes    Depressive symptoms? no          SI/HI? No    Work status: Retired    Pain Scores:    Best:       6/10  Worst:     9/10  Usually:   6/10  Today:    8/10    Pain Disability Index  Family/Home Responsibilities:: 10  Recreation:: 8  Social Activity:: 6  Occupation:: 10  Sexual Behavior:: 10  Self Care:: 6  Life-Support Activities:: 2  Pain Disability Index (PDI): 52    Review of Systems   Constitutional:  Negative for activity change, appetite change, chills, fatigue, fever and unexpected weight change.   HENT:  Negative for hearing loss.    Eyes:  Negative for visual disturbance.   Respiratory:  Negative for chest tightness and shortness of breath.    Cardiovascular:  Negative for chest pain.   Gastrointestinal:  Negative for abdominal pain, constipation, diarrhea, nausea and vomiting.   Genitourinary:  Negative for difficulty urinating.   Musculoskeletal:  Positive for back pain, gait problem and myalgias. Negative for arthralgias, neck pain and neck stiffness.   Skin:  Negative for rash.   Neurological:  Negative for dizziness, weakness, light-headedness, numbness and headaches.   Psychiatric/Behavioral:  Positive for sleep disturbance. Negative for hallucinations and suicidal ideas. The patient is not  nervous/anxious.        Past Medical History:   Diagnosis Date    Dementia     Fibromyalgia     Hypertension        Past Surgical History:   Procedure Laterality Date    CAUDAL EPIDURAL STEROID INJECTION  08/24/2023    CAUDAL EPIDURAL STEROID INJECTION N/A 8/24/2023    Procedure: Injection-steroid-epidural-caudal;  Surgeon: Deep Ortega Jr., MD;  Location: [facility];  Service: Pain Management;  Laterality: N/A;    HYSTERECTOMY         Social History     Socioeconomic History    Marital status:    Tobacco Use    Smoking status: Never    Smokeless tobacco: Never   Substance and Sexual Activity    Alcohol use: No    Drug use: No    Sexual activity: Yes     Partners: Male       Review of patient's allergies indicates:  No Known Allergies    Current Outpatient Medications on File Prior to Visit   Medication Sig Dispense Refill    clonazePAM (KLONOPIN) 0.5 MG disintegrating tablet Take 1 tablet (0.5 mg total) by mouth daily as needed (cramping pain). 10 tablet 0    cyanocobalamin, vitamin B-12, 5,000 mcg Subl Place one under tongue once a day for 1 month, then continue to take under tongue per week 30 tablet 3    ezetimibe (ZETIA) 10 mg tablet Take 10 mg by mouth once daily.      gabapentin (NEURONTIN) 300 MG capsule TAKE 1 CAPSULE(300 MG) BY MOUTH THREE TIMES DAILY 90 capsule 2    guaiFENesin 100 mg/5 ml (ROBITUSSIN) 100 mg/5 mL syrup Take 200 mg by mouth 3 (three) times daily as needed for Cough.      magnesium oxide (MAG-OX) 400 mg (241.3 mg magnesium) tablet Take 400 mg by mouth once daily.      meloxicam (MOBIC) 7.5 MG tablet Take 1 tablet (7.5 mg total) by mouth daily as needed for Pain. 30 tablet 2    NIFEdipine (PROCARDIA-XL) 60 MG (OSM) 24 hr tablet Take 1 tablet (60 mg total) by mouth once daily. 90 tablet 1    pyridoxine, vitamin B6, (B-6) 100 MG Tab Take 3 tablets twice a day for 1 month, then 2 tablets twice a day for 1 month, then continue 1 tablet twice a day 180 tablet 1    QUEtiapine  "(SEROQUEL) 25 MG Tab TAKE ONE-HALF (1/2) TABLET EVERY EVENING 90 tablet 3    tiZANidine (ZANAFLEX) 2 MG tablet Take 0.5-1 tablets (1-2 mg total) by mouth every 8 (eight) hours as needed (back pain). 90 tablet 2     No current facility-administered medications on file prior to visit.       Objective:      BP (!) 128/59   Pulse 68   Ht 5' 4" (1.626 m)   Wt 64.4 kg (142 lb)   BMI 24.37 kg/m²     Exam:  GEN:  Well developed, well nourished.  No acute distress.  Normal pain behavior.  HEENT:  No trauma.  Mucous membranes moist.  Nares patent bilaterally.  PSYCH: Normal affect. Thought content appropriate.  CHEST:  Breathing symmetric.  No audible wheezing.  ABD: Soft, non-distended.  SKIN:  Warm, pink, dry.  No rash on exposed areas.    EXT:  No cyanosis, clubbing, or edema.  No color change or changes in nail or hair growth.  NEURO/MUSCULOSKELETAL:  Fully alert, oriented, and appropriate. Speech normal zuri. No cranial nerve deficits.   Gait:  Normal.  No trendelenburg sign bilaterally.   Motor Strength:  5/5 motor strength throughout lower extremities.   Sensory:  No sensory deficit in the lower extremities.   L-Spine:  Full ROM with pain on extension more than flexion.  Positive pain with axial/facet loading bilaterally.  Negative SLR bilaterally.    No TTP over lumbar paraspinals, bilateral SI joints, hips, piriformis muscles, or GTB.          Imaging:  Narrative & Impression    EXAMINATION:  MRI LUMBAR SPINE WITHOUT CONTRAST     CLINICAL HISTORY:  Low back pain, symptoms persist with > 6wks conservative treatment; Other intervertebral disc degeneration, lumbar region     TECHNIQUE:  Multiplanar, multisequence MR images were acquired from the thoracolumbar junction to the sacrum without the administration of contrast.     COMPARISON:  Radiograph 08/22/2022     FINDINGS:  Alignment: Mild scoliosis convex LEFT.  Grade 1 anterolisthesis L3 on L4-L4 on L5.     Vertebrae: 5 lumbar-type vertebral bodies. No " aggressive marrow replacement process or fracture.Vertebral endplatebone marrow edema consistent with degenerative change L3-L4 L4-L5.     Discs: Disc space narrowing most evident L3-L4 L4-L5 level.     Cord: Normal. Conus terminates at L1.     Degenerative findings:     T12-L1: There is no focal disc herniation. No significant central canal narrowing . No significant neural foraminal narrowing.     L1-L2: There is no focal disc herniation. No significant central canal narrowing . No significant neural foraminal narrowing.     L2-L3: Broad-based disc bulge, facet degenerative change and ligamentum flavum thickening which contribute to  mild central canal narrowing and encroachment upon the LEFT lateral recess.  No significant neural foraminal narrowing.     L3-L4: Broad-based disc bulge, facet degenerative change and ligamentum flavum thickening which contribute to severe central canal narrowing and narrowing of the lateral recesses bilaterally.  Mild RIGHT and severe LEFT neural foraminal narrowing.     L4-L5: Broad-based disc bulge, facet degenerative change and ligamentum flavum thickening which contribute to severe central canal narrowing and narrowing of the lateral recesses bilaterally. Moderate-severe bilateral RIGHT worse than LEFT neural foraminal narrowing.     L5-S1: There is no focal disc herniation. No significant central canal narrowing . Mild bilateral neural foraminal narrowing.     Paraspinal muscles & soft tissues: Unremarkable.     Impression:     Multilevel lumbar spondylosis, worst L3-L4 L4-L5 level with severe central canal narrowing and associated neural foraminal encroachment as detailed above.        Electronically signed by: John Soto MD  Date:                                            08/09/2023  Time:                                           10:50     Narrative & Impression    EXAMINATION:  XR LUMBAR SPINE AP AND LATERAL     CLINICAL HISTORY:  Other chronic pain      TECHNIQUE:  AP, lateral and spot images were performed of the lumbar spine.     COMPARISON:  None     FINDINGS:  20 degree or so lumbar dextrocurvature.  No acute fractures visualized lower thoracic or lumbar vertebral segments, preserved vertebral body heights and pedicles.  Five non rib-bearing lumbar vertebral segments.  No spondylolysis.  Grade 1 anterolisthesis L3 on L4 and L4 on L5..  Moderate to severe disc narrowing L3-L4 level with opposing endplate sclerosis and vacuum phenomenon.  Moderate to severe disc narrowing L4-L5 level.  Moderate to severe disc narrowing and vacuum phenomenon L5-S1 level.  Multilevel lumbar facet arthropathic changes greatest about the lower 3 levels.  Some abdominal aortic wall vascular calcification.  Intact right and left SI joints and visualized hip joint spaces.     Impression:     As above.        Electronically signed by: Octavio Cornelius  Date:                                            08/22/2022  Time:                                           11:22       Assessment:       Encounter Diagnoses   Name Primary?    DDD (degenerative disc disease), lumbar Yes    Lumbar radiculopathy     Dorsalgia, unspecified     Lumbar spondylosis        Plan:       Mamta was seen today for back pain.    Diagnoses and all orders for this visit:    DDD (degenerative disc disease), lumbar    Lumbar radiculopathy    Dorsalgia, unspecified    Lumbar spondylosis        Mamta Bates is a 78 y.o. female with chronic bilateral lower back and lower extremity pain.  Exact etiology of pain is somewhat unclear at this time as her symptoms are somewhat nonspecific, also patient is a poor historian due to dementia.  Symptoms appear to be multifactorial, consistent with both facet arthropathy as well as neurogenic claudication related to spinal stenosis.  Lumbar MRI does show multilevel degenerative changes most notable at L3-4 and L4-5.  Large broad-based disc bulges at these levels creating severe central  canal stenosis as well as moderate to severe bilateral foraminal narrowing.  Anterolisthesis noted at L3 on L4 and L4 on L5.  Mild relief of lower extremity pain following caudal URI.    Prior records reviewed.  Pertinent imaging studies reviewed by me. Imaging results were discussed with patient.  Patient is s/p caudal epidural steroid injection with mild-moderate relief of lower extremity pain.  She does continue to note significant pain across the lower back as well as occasional pain radiating into her lower extremities and calves.  Patient's pain appears to be multifactorial likely related to both neurogenic claudication due to significant spinal stenosis noted at L3-4 and L4-5.  Also likely having a degree of pain related to facet arthropathy.  Exact etiology somewhat unclear as patient is a poor historian.  Schedule for bilateral L2, L3, L4 medial branch blocks.  If successful can proceed with radiofrequency ablation.  Stressed the importance of maintaining regular home exercise program and being mindful of how they use their back throughout the day.  Patient expressed understanding and agreement.  Patient can continue with medications since they are providing benefit, using them appropriately, and without adverse effects.    Return to clinic 2 weeks postprocedure to discuss efficacy.      Adam Leahy PA-C  [facility]  Interventional Pain Management   09/12/2023    This note was created by combination of typed  and M-Modal dictation.  Transcription and phonetic errors may be present.  If there are any questions, please contact me.

## 2023-10-04 ENCOUNTER — PATIENT MESSAGE (OUTPATIENT)
Dept: PAIN MEDICINE | Facility: CLINIC | Age: 78
End: 2023-10-04
Payer: MEDICARE

## 2023-10-04 DIAGNOSIS — M47.816 LUMBAR SPONDYLOSIS: Primary | ICD-10-CM

## 2023-10-13 ENCOUNTER — TELEPHONE (OUTPATIENT)
Dept: PAIN MEDICINE | Facility: CLINIC | Age: 78
End: 2023-10-13
Payer: MEDICARE

## 2023-10-13 NOTE — TELEPHONE ENCOUNTER
Spoke with patient and . Discussed scheduling procedures at this location. Patient agreed to dates of 11/9, 1/30, 12/14, and 12/28/23.    Reviewed pre op instructions with patient:    Please leave jewelry and valuables at home or give them to your escort for safe keeping.  You will be required to have an adult to escort you after the procedure is over. Although we will not be sedating you for your procedures on 11/9 and 11/30 we ask for an escort for safety after the procedure.  Cleanse your skin the evening before with an antibacterial soap (Dial or Hibiclens) and then repeat it again the morning of the procedure.  Do not apply lotions, creams, deodorants, perfumes, or colognes the day of the procedure.  You will be contacted the day before the procedure to be given the time to arrive the day of the procedure. If you are not contacted by the afternoon before the procedure you should contact [phone].  You may have a light meal up to 6 hours before the procedures on 11/9 and 11/30. Nothing by mouth after midnight on 12/14 and 12/30.    Patient verbalized understanding of the instructions provided to them and clinic contact number was provided for any further questions they may have.

## 2023-10-31 ENCOUNTER — TELEPHONE (OUTPATIENT)
Dept: NEUROLOGY | Facility: CLINIC | Age: 78
End: 2023-10-31
Payer: MEDICARE

## 2023-11-14 ENCOUNTER — OFFICE VISIT (OUTPATIENT)
Dept: PAIN MEDICINE | Facility: CLINIC | Age: 78
End: 2023-11-14
Payer: MEDICARE

## 2023-11-14 VITALS
HEART RATE: 80 BPM | WEIGHT: 150.38 LBS | SYSTOLIC BLOOD PRESSURE: 131 MMHG | DIASTOLIC BLOOD PRESSURE: 74 MMHG | HEIGHT: 64 IN | BODY MASS INDEX: 25.67 KG/M2

## 2023-11-14 DIAGNOSIS — M47.816 LUMBAR SPONDYLOSIS: ICD-10-CM

## 2023-11-14 DIAGNOSIS — M54.9 DORSALGIA, UNSPECIFIED: ICD-10-CM

## 2023-11-14 DIAGNOSIS — M51.36 DDD (DEGENERATIVE DISC DISEASE), LUMBAR: Primary | ICD-10-CM

## 2023-11-14 DIAGNOSIS — M54.16 LUMBAR RADICULOPATHY: ICD-10-CM

## 2023-11-14 PROCEDURE — 99999 PR PBB SHADOW E&M-EST. PATIENT-LVL III: ICD-10-PCS | Mod: PBBFAC,,,

## 2023-11-14 PROCEDURE — 99213 OFFICE O/P EST LOW 20 MIN: CPT | Mod: PBBFAC,PN

## 2023-11-14 PROCEDURE — 99214 OFFICE O/P EST MOD 30 MIN: CPT | Mod: S$PBB,,,

## 2023-11-14 PROCEDURE — 99999 PR PBB SHADOW E&M-EST. PATIENT-LVL III: CPT | Mod: PBBFAC,,,

## 2023-11-14 PROCEDURE — 99214 PR OFFICE/OUTPT VISIT, EST, LEVL IV, 30-39 MIN: ICD-10-PCS | Mod: S$PBB,,,

## 2023-11-14 RX ORDER — ZOLPIDEM TARTRATE 5 MG/1
5 TABLET ORAL NIGHTLY PRN
COMMUNITY
Start: 2023-11-13 | End: 2023-12-15

## 2023-11-14 NOTE — PROGRESS NOTES
Subjective:     Patient ID: Mamta Bates is a 78 y.o. female    Chief Complaint: Back Pain        Referred by: Self, Aaareferral      HPI:    Interval History PA (11/14/2023):  Patient returns to clinic for follow up.  Patient is s/p bilateral L2, L3, L4 medial branch block #1.  Patient is a poor historian due to history of dementia, most of today's information has been provided by patient's .  Following the 1st set of medial branch blocks the patient does report having benefit initially.  Notes immediately following the procedure her back pain was significantly reduced and overall at a more manageable level.  Patient did continue to note significant pain in her bilateral lower extremities, mainly in the calves region.  However feels there was good benefit for her bilateral lower lumbar paraspinal pain.  Relief lasted approximately 4 hours.  Pain then started returned and was at baseline later that evening.  The patient's  specifically noting less pain complaints following the procedure and throughout the remainder of the day.  Notes that he asked the patient various times what her pain level as was and which he notes was at a 2-3/10.  Reporting approximately 80% improvement.  States he noticed a difference in her overall pain levels as well as mood and activity levels.  States he did note improvement in ADLs on the day of the procedure.  Patient and  would like to continue with the 2nd set of medial branch blocks.    Interval History PA (09/12/2023):  Patient returns to clinic for follow up.  Patient is s/p caudal epidural steroid injection done on 08/24/2023 with some improvement of her lower back and lower extremity pain although patient having difficulty quantifying relief given history of dementia.  Patient is a poor historian, most of the information provided by patient's .  Patient is noted to have decreased cramping and pain in her bilateral calves only the injection.  Some  decreased lower extremity symptoms.  However patient continues to endorse significant pain in her midline lower lumbar spine into her bilateral lumbar paraspinal region.  Also having continued pain radiating into her bilateral lower extremities however radiating pain is less severe since the injection.  Patient does have a history of fibromyalgia with diffuse pain patterns throughout upper, lower back and extremities.  Denies any numbness, tingling, weakness, bowel or bladder dysfunction.  She continues to take Tylenol, BC powder, gabapentin, and tizanidine p.r.n. with benefit, denies any adverse effects from these medications.    Interval History PA (08/15/2023):  Patient returns to clinic for follow up and MRI review.  Patient denies any changes in the quality or location of her pain since previous visit.  She continues to endorse bilateral lower back pain with occasional radiation into her bilateral lower extremities.  Patient does have a history of fibromyalgia and is reporting diffuse pain patterns throughout her neck, upper back, lower back and extremities.  Majority of pain appears to be located in her lower back as well as noting significant pain in her bilateral calves.  Notes associated muscle spasms and sharp pain that occurs intermittently in her calves.  Notes pain is constant throughout the day, worsened with certain activities.  Notes having to take frequent breaks to alleviate the pain.  Denies any weakness, numbness, tingling, bowel or bladder dysfunction.  She continues to take Tylenol, BC powder, gabapentin and tizanidine p.r.n. with benefit, denies any adverse effects from these medications.    Interval History PA (07/28/2023):  Patient returns to clinic for follow up of chronic bilateral lower back and lower extremity pain.  Patient reporting various intermittent pains throughout her body.  She does have a history of fibromyalgia and has followed up with Rheumatology.  Rheumatology not find any  inflammatory arthritis.  Reporting that the worst of her pain is located across her bilateral lower lumbar paraspinal region.  Reports pain is constant, achy.  Notes worsens with certain activities and throughout the day.  Patient also noting pain occasionally radiating from her back into her bilateral thighs and into her calves.  Noting significant calf pain, associated with occasional cramps and muscle tightness.  Denies any weakness, numbness, tingling, bowel or bladder dysfunction.  Patient has tried various medications including tramadol, Cymbalta, Lyrica, gabapentin without significant benefit.  Currently taking Tylenol p.r.n., BC powder, gabapentin and tizanidine p.r.n..  Reports some benefit with these medications, denies any adverse effects.    Initial Encounter (9/20/22):  Mamta Bates is a 78 y.o. female who presents today with chronic low back and lower extremity pain.  This pain has been present for years.  Patient states that she has been diagnosed with fibromyalgia in the past though this diagnosis has been disputed.  Pain is intermittent.  States that the pain can get very severe at times.  During these times it is difficult for her to be active at all.  Pain is located across the lower lumbar region.  The pain will radiate to lower extremities to her calves.  She reports having associated cramps of the lower extremities.  She denies any associated numbness, tingling, weakness, bowel bladder dysfunction.   This pain is described in detail below.    Physical Therapy:  Yes.     Non-pharmacologic Treatment:  Rest helps         TENS?  No    Pain Medications:         Currently taking:  Meloxicam, tizanidine, gabapentin, BC powder    Has tried in the past:  NSAIDs, Tylenol, tramadol, Cymbalta, Lyrica    Has not tried:  TCAs, SNRIs, topical creams    Blood thinners:  None    Interventional Therapies:    08/24/2023 - caudal epidural steroid injection - mild-moderate relief of lower extremity  pain    Relevant Surgeries:  None    Affecting sleep?  Yes    Affecting daily activities? yes    Depressive symptoms? no          SI/HI? No    Work status: Retired    Pain Scores:    Best:       6/10  Worst:     6/10  Usually:   6/10  Today:    6/10    Pain Disability Index  Family/Home Responsibilities:: 4  Recreation:: 4  Social Activity:: 4  Occupation:: 0  Sexual Behavior:: 0  Self Care:: 2  Life-Support Activities:: 2  Pain Disability Index (PDI): 16    Review of Systems   Constitutional:  Negative for activity change, appetite change, chills, fatigue, fever and unexpected weight change.   HENT:  Negative for hearing loss.    Eyes:  Negative for visual disturbance.   Respiratory:  Negative for chest tightness and shortness of breath.    Cardiovascular:  Negative for chest pain.   Gastrointestinal:  Negative for abdominal pain, constipation, diarrhea, nausea and vomiting.   Genitourinary:  Negative for difficulty urinating.   Musculoskeletal:  Positive for back pain, gait problem and myalgias. Negative for arthralgias, neck pain and neck stiffness.   Skin:  Negative for rash.   Neurological:  Negative for dizziness, weakness, light-headedness, numbness and headaches.   Psychiatric/Behavioral:  Positive for sleep disturbance. Negative for hallucinations and suicidal ideas. The patient is not nervous/anxious.        Past Medical History:   Diagnosis Date    Dementia     Fibromyalgia     Hypertension        Past Surgical History:   Procedure Laterality Date    Block-nerve-medial branch-lumbar---BILATERAL L2, L3, L4 - Bilateral Bilateral 11/09/2023    CAUDAL EPIDURAL STEROID INJECTION  08/24/2023    CAUDAL EPIDURAL STEROID INJECTION N/A 08/24/2023    Procedure: Injection-steroid-epidural-caudal;  Surgeon: Deep Ortega Jr., MD;  Location: [facility];  Service: Pain Management;  Laterality: N/A;    HYSTERECTOMY      INJECTION OF ANESTHETIC AGENT AROUND MEDIAL BRANCH NERVES INNERVATING LUMBAR FACET JOINT  Bilateral 11/9/2023    Procedure: Block-nerve-medial branch-lumbar---BILATERAL L2, L3, L4;  Surgeon: Deep Ortega Jr., MD;  Location: [facility];  Service: Pain Management;  Laterality: Bilateral;       Social History     Socioeconomic History    Marital status:    Tobacco Use    Smoking status: Never    Smokeless tobacco: Never   Substance and Sexual Activity    Alcohol use: No    Drug use: No    Sexual activity: Yes     Partners: Male       Review of patient's allergies indicates:  No Known Allergies    Current Outpatient Medications on File Prior to Visit   Medication Sig Dispense Refill    clonazePAM (KLONOPIN) 0.5 MG disintegrating tablet Take 1 tablet (0.5 mg total) by mouth daily as needed (cramping pain). 10 tablet 0    cyanocobalamin, vitamin B-12, 5,000 mcg Subl Place one under tongue once a day for 1 month, then continue to take under tongue per week 30 tablet 3    ezetimibe (ZETIA) 10 mg tablet Take 10 mg by mouth once daily.      gabapentin (NEURONTIN) 300 MG capsule TAKE 1 CAPSULE(300 MG) BY MOUTH THREE TIMES DAILY 90 capsule 2    guaiFENesin 100 mg/5 ml (ROBITUSSIN) 100 mg/5 mL syrup Take 200 mg by mouth 3 (three) times daily as needed for Cough.      magnesium oxide (MAG-OX) 400 mg (241.3 mg magnesium) tablet Take 400 mg by mouth once daily.      meloxicam (MOBIC) 7.5 MG tablet Take 1 tablet (7.5 mg total) by mouth daily as needed for Pain. 30 tablet 2    NIFEdipine (PROCARDIA-XL) 60 MG (OSM) 24 hr tablet Take 1 tablet (60 mg total) by mouth once daily. 90 tablet 1    pyridoxine, vitamin B6, (B-6) 100 MG Tab Take 3 tablets twice a day for 1 month, then 2 tablets twice a day for 1 month, then continue 1 tablet twice a day 180 tablet 1    QUEtiapine (SEROQUEL) 25 MG Tab TAKE ONE-HALF (1/2) TABLET EVERY EVENING 90 tablet 3    tiZANidine (ZANAFLEX) 2 MG tablet Take 0.5-1 tablets (1-2 mg total) by mouth every 8 (eight) hours as needed (back pain). 90 tablet 2    zolpidem (AMBIEN) 5 MG  "Tab Take 5 mg by mouth nightly as needed.       No current facility-administered medications on file prior to visit.       Objective:      /74   Pulse 80   Ht 5' 4" (1.626 m)   Wt 68.2 kg (150 lb 5.7 oz)   BMI 25.81 kg/m²     Exam:  GEN:  Well developed, well nourished.  No acute distress.  Normal pain behavior.  HEENT:  No trauma.  Mucous membranes moist.  Nares patent bilaterally.  PSYCH: Normal affect. Thought content appropriate.  CHEST:  Breathing symmetric.  No audible wheezing.  ABD: Soft, non-distended.  SKIN:  Warm, pink, dry.  No rash on exposed areas.    EXT:  No cyanosis, clubbing, or edema.  No color change or changes in nail or hair growth.  NEURO/MUSCULOSKELETAL:  Fully alert, oriented, and appropriate. Speech normal zuri. No cranial nerve deficits.   Gait:  Normal.  No trendelenburg sign bilaterally.       Imaging:  Narrative & Impression    EXAMINATION:  MRI LUMBAR SPINE WITHOUT CONTRAST     CLINICAL HISTORY:  Low back pain, symptoms persist with > 6wks conservative treatment; Other intervertebral disc degeneration, lumbar region     TECHNIQUE:  Multiplanar, multisequence MR images were acquired from the thoracolumbar junction to the sacrum without the administration of contrast.     COMPARISON:  Radiograph 08/22/2022     FINDINGS:  Alignment: Mild scoliosis convex LEFT.  Grade 1 anterolisthesis L3 on L4-L4 on L5.     Vertebrae: 5 lumbar-type vertebral bodies. No aggressive marrow replacement process or fracture.Vertebral endplatebone marrow edema consistent with degenerative change L3-L4 L4-L5.     Discs: Disc space narrowing most evident L3-L4 L4-L5 level.     Cord: Normal. Conus terminates at L1.     Degenerative findings:     T12-L1: There is no focal disc herniation. No significant central canal narrowing . No significant neural foraminal narrowing.     L1-L2: There is no focal disc herniation. No significant central canal narrowing . No significant neural foraminal narrowing.   "   L2-L3: Broad-based disc bulge, facet degenerative change and ligamentum flavum thickening which contribute to  mild central canal narrowing and encroachment upon the LEFT lateral recess.  No significant neural foraminal narrowing.     L3-L4: Broad-based disc bulge, facet degenerative change and ligamentum flavum thickening which contribute to severe central canal narrowing and narrowing of the lateral recesses bilaterally.  Mild RIGHT and severe LEFT neural foraminal narrowing.     L4-L5: Broad-based disc bulge, facet degenerative change and ligamentum flavum thickening which contribute to severe central canal narrowing and narrowing of the lateral recesses bilaterally. Moderate-severe bilateral RIGHT worse than LEFT neural foraminal narrowing.     L5-S1: There is no focal disc herniation. No significant central canal narrowing . Mild bilateral neural foraminal narrowing.     Paraspinal muscles & soft tissues: Unremarkable.     Impression:     Multilevel lumbar spondylosis, worst L3-L4 L4-L5 level with severe central canal narrowing and associated neural foraminal encroachment as detailed above.        Electronically signed by: John Soto MD  Date:                                            08/09/2023  Time:                                           10:50     Narrative & Impression    EXAMINATION:  XR LUMBAR SPINE AP AND LATERAL     CLINICAL HISTORY:  Other chronic pain     TECHNIQUE:  AP, lateral and spot images were performed of the lumbar spine.     COMPARISON:  None     FINDINGS:  20 degree or so lumbar dextrocurvature.  No acute fractures visualized lower thoracic or lumbar vertebral segments, preserved vertebral body heights and pedicles.  Five non rib-bearing lumbar vertebral segments.  No spondylolysis.  Grade 1 anterolisthesis L3 on L4 and L4 on L5..  Moderate to severe disc narrowing L3-L4 level with opposing endplate sclerosis and vacuum phenomenon.  Moderate to severe disc narrowing L4-L5 level.   Moderate to severe disc narrowing and vacuum phenomenon L5-S1 level.  Multilevel lumbar facet arthropathic changes greatest about the lower 3 levels.  Some abdominal aortic wall vascular calcification.  Intact right and left SI joints and visualized hip joint spaces.     Impression:     As above.        Electronically signed by: Octavio Cornelius  Date:                                            08/22/2022  Time:                                           11:22       Assessment:       Encounter Diagnoses   Name Primary?    DDD (degenerative disc disease), lumbar Yes    Lumbar radiculopathy     Lumbar spondylosis     Dorsalgia, unspecified          Plan:       Mamta was seen today for back pain.    Diagnoses and all orders for this visit:    DDD (degenerative disc disease), lumbar    Lumbar radiculopathy    Lumbar spondylosis    Dorsalgia, unspecified          Mamta Bates is a 78 y.o. female with chronic bilateral lower back and lower extremity pain.  Exact etiology of pain is somewhat unclear at this time as her symptoms are somewhat nonspecific, also patient is a poor historian due to dementia.  Symptoms appear to be multifactorial, consistent with both facet arthropathy as well as neurogenic claudication related to spinal stenosis.  Lumbar MRI does show multilevel degenerative changes most notable at L3-4 and L4-5.  Large broad-based disc bulges at these levels creating severe central canal stenosis as well as moderate to severe bilateral foraminal narrowing.  Anterolisthesis noted at L3 on L4 and L4 on L5.  Mild relief of lower extremity pain following caudal URI.    Prior records reviewed.  Pertinent imaging studies reviewed by me. Imaging results were discussed with patient.  Patient is s/p bilateral L2, L3, L4 medial branch block #1.  Overall reporting significant relief immediately following the procedure, approximately 80% relief of her bilateral lower back pain.  Patient also noting improvement with ADLs.   Patient did note some continued pain mainly located in her bilateral lower extremities.  As previously discussed her pain is likely multifactorial related to both neurogenic claudication due to significant spinal stenosis at the L3-4 and L4-5 level.  Also likely having a degree of pain related to lower lumbar facet arthropathy.  Discussed that these procedures are unlikely to provide comprehensive relief of her lower back and extremity pain however it does appear that the lumbar MBB provided significant relief of her bilateral lower back pain.  Continue with bilateral L2, L3, L4 medial branch block #2.  If successful can proceed with radiofrequency ablation.  Stressed the importance of maintaining regular home exercise program and being mindful of how they use their back throughout the day.  Patient expressed understanding and agreement.  Patient can continue with medications since they are providing benefit, using them appropriately, and without adverse effects.  Return to clinic 2 weeks postprocedure to discuss efficacy.            Adam Leahy PA-C  [facility]  Interventional Pain Management   11/14/2023    This note was created by combination of typed  and M-Modal dictation.  Transcription and phonetic errors may be present.  If there are any questions, please contact me.

## 2023-12-15 ENCOUNTER — TELEPHONE (OUTPATIENT)
Dept: PAIN MEDICINE | Facility: CLINIC | Age: 78
End: 2023-12-15
Payer: MEDICARE

## 2023-12-15 ENCOUNTER — OFFICE VISIT (OUTPATIENT)
Dept: NEUROLOGY | Facility: CLINIC | Age: 78
End: 2023-12-15
Payer: MEDICARE

## 2023-12-15 ENCOUNTER — TELEPHONE (OUTPATIENT)
Dept: NEUROLOGY | Facility: CLINIC | Age: 78
End: 2023-12-15
Payer: MEDICARE

## 2023-12-15 ENCOUNTER — TELEPHONE (OUTPATIENT)
Dept: NEUROLOGY | Facility: CLINIC | Age: 78
End: 2023-12-15

## 2023-12-15 DIAGNOSIS — G47.00 INSOMNIA, UNSPECIFIED TYPE: ICD-10-CM

## 2023-12-15 DIAGNOSIS — G31.09 BEHAVIORAL VARIANT FRONTOTEMPORAL DEMENTIA: ICD-10-CM

## 2023-12-15 DIAGNOSIS — R47.01 SEMANTIC APHASIA: ICD-10-CM

## 2023-12-15 DIAGNOSIS — F02.818 BEHAVIORAL VARIANT FRONTOTEMPORAL DEMENTIA: ICD-10-CM

## 2023-12-15 DIAGNOSIS — R45.1 AGITATION: ICD-10-CM

## 2023-12-15 DIAGNOSIS — F02.B18 OTHER MODERATE FRONTOTEMPORAL DEMENTIA WITH OTHER BEHAVIORAL DISTURBANCE: Primary | ICD-10-CM

## 2023-12-15 DIAGNOSIS — G31.09 OTHER MODERATE FRONTOTEMPORAL DEMENTIA WITH OTHER BEHAVIORAL DISTURBANCE: Primary | ICD-10-CM

## 2023-12-15 DIAGNOSIS — R46.89 OPPOSITIONAL BEHAVIOR: ICD-10-CM

## 2023-12-15 PROCEDURE — 99215 OFFICE O/P EST HI 40 MIN: CPT | Mod: 95,,, | Performed by: PSYCHIATRY & NEUROLOGY

## 2023-12-15 PROCEDURE — 96116 NUBHVL XM PHYS/QHP 1ST HR: CPT | Mod: 95,59,, | Performed by: PSYCHIATRY & NEUROLOGY

## 2023-12-15 PROCEDURE — 96116 PR NEUROBEHAVIORAL STATUS EXAM BY PSYCH/PHYS: ICD-10-PCS | Mod: 95,59,, | Performed by: PSYCHIATRY & NEUROLOGY

## 2023-12-15 PROCEDURE — 99215 PR OFFICE/OUTPT VISIT, EST, LEVL V, 40-54 MIN: ICD-10-PCS | Mod: 95,,, | Performed by: PSYCHIATRY & NEUROLOGY

## 2023-12-15 RX ORDER — QUETIAPINE FUMARATE 25 MG/1
50 TABLET, FILM COATED ORAL NIGHTLY
Qty: 60 TABLET | Refills: 1 | Status: SHIPPED | OUTPATIENT
Start: 2023-12-15 | End: 2024-01-22

## 2023-12-15 NOTE — TELEPHONE ENCOUNTER
Spoke with patient's spouse. Stated the patient received >80% relief from the procedure yesterday. Indicated an increase in her activity temporarily after the procedure. New PDI results are as follows: 2 2 2 0 0 0 0. Patient returned to baseline pain level prior to the procedure. No further issues discussed.

## 2023-12-15 NOTE — PROGRESS NOTES
[facility]     PATIENT: Mamta Bates  VISIT DATE: 12/15/2023  MRN: [id number]  PRIMARY PROVIDER: Ibeth Harris MD  : 1945       Chief complaint: Progressive Cognitive Impairment     History of present illness:      The patient is a 78-year-old right-handed female who presents today to the [facility] due to concerns related to Progressive Cognitive Impairment.  The patient is accompanied by the  who participates in providing history.  Additional information is obtained by reviewing available medical records.     Relevant Background/Context  Known Relevant Family history:  Mother  of 74 y/o CAD  Father  in NH old age (mid-75) unknown issues, possible NCD  Sister - DM2  Sister in wheelchair  60s 'fell off'  Neurocognitive Disorder:  The family denies a history of early/late onset cognitive impairment.  Movement Disorder:  The family denies a history of movement disorder (PD, PDD, tremor, etc).  Motorneuron Disorder:  The family denies a history of motor neuron disease (ALS).  Developmental Disorder:  The family denies a history of developmental learning disorder (Dyslexia, ADHD, ASD, etc.).  Psychiatric Disorder:  Sister - MDD related to LE dysfunction  Known Relevant Genetics:  There is no known relevant genetic testing available.  Developmental Milestones:  The patient/family report no known birth complications or early life problems. The patient met all developmental milestones.  Education/Learning Capacity:  The patient/family report no signs or symptoms suggestive of developmental learning disorder.  HS.  Associated degree in business - patient is unable to describe in meaningful detail due to aphasia  Estimated Educational Experience: 14 years of formal education.  Social History:  Patient lives with her  who is the primary caregiver. She is limited additional social support.  "She has no living relatives is the "last one alive". And has no children. since correction for the age 65 patient has not been engaged any significant meaningful social or community activities. Family reports that following correction she was actively traveling with her  for. However this stopped early. She was diagnosed with fibromyalgia sometime within 5 years correction and has reported minimal social or cognitive stimulation in the form of social with her cognitive exercises subsequently thereafter.  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 She reports being the  of [employer] for extended period of time and frequently references politicians and advocacy groups that she has been involved with. Patient retired at the age [age] for unclear reasons.  Retired/Quit: 0  Relevant Medical History:  HTN  Fibromyalgia     Neurocognitive Disorder Features  Onset/Duration:  Jul 2020 (~3-year)  First Symptom:  Memory impairment  Progression:  Gradually Progressive  Clinical Course:  Hospitalization (05/04/2022)  Type: Chart Review. The patient is a 77-year-old female past medical history fibromyalgia, hypertension, hysterectomy who presents with her  for concern for altered mental status including slurred speech, pain to lower extremities, seeing lights, and not making sense. Patient's her  reports that he believes that she has had some early signs of dementia at getting worse over the past few months however the patient does not agree to seek any medical care until today. He says it has been hard to convince her to seek any treatment. He states that sometimes she is hallucinating and sometimes she has garbled speech. He states today he left the house this morning at 9:00 a. M. To run errands. At that time she was not "normal" but that she was at her baseline. When he returned home about 2-1/2 hours ago he noted that she seemed to have slurred " speech, reporting seeing lights, complaining severe pain to her legs.  Electronic Medical Record (05/06/2022)  Type: Chart Review. Pt recently D/C'd from hospital for HTN encephalopathy. Concern from pt's her  for ongoing cognitive decline past couple of years but did not want to see a neurologist. Started on seroquel 12. 5mg QHS at D/C. CTH on file with mild volume loss per report.  Primary Care Provider (05/11/2022)  Type: Chart Review. Ms. the patient is a 77 year-old female with fibromyalgia and HTN who is presenting to clinic as a hospital follow-up appointment. She was recently hospitalized with encephalopathy secondary to a hypertensive urgency that resolved with re-initiation of her anti-hypertensives. She was discharged home on nifedipine and seroquel was started due to concerns of chronic changes in mentation. Since discharged home she has been doing well. Her  has been monitoring her blood pressure and has had to hold the nifedipine several times due to systolic blood pressure readings of 100. Per her , she has been having intermittent changes in mental status for the past months- he relayed this concern to the primary team while she was hospitalized and an appointment has been scheduled with neurology for further workup.  [facility] - Jaison Javier MD. Neurologist (07/20/2022)  Type: Chart Review. The patient presents with her , who is the primary historian. Additional history is gathered from a review of previous medical records. 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 The circumstances surrounding her early FDC are unclear. The patient professes significant pride in her work and has not been very active since her FDC. The family reported within a few years of her FDC, the patient was diagnosed with fibromyalgia in the setting of chronic pain and cramps. The earliest reference to  this diagnosis in the medical chart was in 2015. It is unclear how this diagnosis was made, her specific symptoms, and whether there was an actual association in time with the patient's cognitive impairment. The patient denies any personal or her family history of autoimmune disorders. The onset of the patient's symptoms is unclear; however, her family thinks it happened during early COVID. The patient denies yandy COVID before or during the beginning of her symptoms. Her family denies any other overt changes in their daily behavior or medications. Over the last two years, her family reported in no specific order progressive incomprehensive speech, garbled talk with agitation, irritability, and apathy. The patient has always been active, social, and engaged; however, she has become more withdrawn and apathetic watching TV. She has become mildly paranoid and disinterested. The family reported an overall change in her personality; however, more so a magnification of baseline tendencies rather than a fundamental shift in temperament. Her family says she has been having increasing episodes of confusion and miscommunication between the patient and her family resulting in growing irritability and frustration, which compounds deficits and has resulted in agitation. During periods of frustration with agitation patient's cognitive deficits, including memory deficits, appear to worsen the patient's behavior programs more difficult to manage. Between 2020 and 2021, deficits gradually increased, becoming more possessive without over compulsions worsening memory and irritability. During this time frame, her family denies any overt signs of disinhibition, lack of sympathy or empathy, change in eating behavior, restlessness, movement disorders, or motor changes. From [id number] her family has requested a medical professional see her; however, she has refused this at every opportunity. The patient does have significant  semantic aphasia deficits. She can describe in unclear words that she does not feel intervention would change anything and that her deficits are due to age. In 2022, the patient's cognitive deficits cristina to medication noncompliance and interference with instrumental activities of daily living. The patient presented to the hospital in early 2022 with hypertensive encephalopathy, likely due to medication noncompliance. Following this hospitalization, staff recognized the patient's neurocognitive deficits, and she was referred to our neurocognitive Clinic for further evaluation. On presentation, the patient has diminished interpersonal responsiveness to social cues and is loud, and fast with somewhat incomprehensible speech, semantic aphasia, limited insight, and mild disinhibition. The observations made above, were discussed with the patient and her family. The family reports that minimum a 2 year history progressive multi domain cognitive impairment sufficient interfere with instrumental activities of daily living. Neurocognitive assessment shows language predominant multi major cognitive impairment. Neurological imaging is consistent with frontal temporal lobar degeneration. 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 Is unclear whether the patient is actively having hallucinations or whether not this is due to semantic aphasia. Recommend monitoring. Given patient's age likely mixed pathology. Recommend a trial of donepezil with follow-up in 1 month ongoing screening labs for reversible cause of cognitive impairment. Following this may consider increasing Seroquel for behavioral disinhibition and referral to speech therapy for additional care support.  [facility] - Jaison Javier MD. Neurologist (06/01/2023)  Type: Chart Review. 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 The patient has significant oppositional care. The patient was subsequently lost to follow-up did not complete her workup. Serum laboratories were within normal limits. No evidence of elevated neurofilament light chain. No evidence of an autoimmune condition. Since last time seen patient's semantic dementia has gradually worsened. On presentation the patient is pleasant appropriate with fluent around chaotic speech with unclear meaning phrases. She is frankly perseverative mildly obsessive however pleasant. her family expresses concerns regarding progressive decline in asking if there any new therapies. We expressed that there is no disease modifying therapy for semantic dementia at this time. her family versus understanding. Recommend focusing on modifiable risk factors. The patient has a notable history of B12 deficiency recommend restarting B12. Regarding patient's bothersome fibromyalgia like symptoms with increased bilateral total lower extremities. We expressed concerns regarding possible opportunity related to cognitive dysfunction. The patient is currently having severe cramping once every 1-2 weeks. We discussed medication options prior trials. Recommend starting gabapentin vitamin B6 for lower extremity hyper excitability and p. R. N. Clonazepam for severe cramping that occurs every 1-2 weeks. We will refill meloxicam will recommend for the patient following up with primary rheumatologist. Recommend repeating B12 panel given history of deficiency without replenishment. The patient expresses understanding like to wait. May also start Belsomra 10 mg q. H. S. For insomnia. We answered all patient's and her family's questions. The observations made above, were discussed with the patient and her family.  [facility] - Jaison Javier MD. Neurologist (07/11/2023)  Type: Chart Review. Last time  seen the patient was diagnosed with frontal temporal dementia with mixed semantic aphasia. Recommend starting Belsomra for insomnia. Recommend repeating blood work for additional biomarker testing. Blood work was not completed. On presentation today her family reports the patient declines blood work or additional investigation. We discussed disease modifying therapy for Alzheimer's disease we discussed that the patient would likely not be a candidate nor would likely respond and minimal we would need to do additional diagnostic testing before confirming the patient was a candidate. The patient refused blood work the patient will likely refuse lumbar puncture. No additional diagnostic management. The patient and her family were not able to start vitamin B12 B6 clonazepam as initially prescribed. Unclear exactly what the issue was. We have called pharmacy pharmacy says they received medication and it has been available for the last month. Recommend following up with social work services as the patient is oppositional to additional diagnostics. All as needed. The observations made above, were discussed with the patient and her family.     Current Presentation  Recent/Interim History:  Since the patient's last visit, there has been an increase in hospitalizations due to chronic pain. To recap, the patient was previously diagnosed with semantic/behavioral variant frontotemporal dementia, exhibiting oppositional behavior and limited insight. We discussed options for symptom management, but the patient was resistant, leading us to explore other tests. Medications for movement disorders and akathisia were suggested, yet not initiated. The patient reports escalating problematic behavior, marked by increased agitation, confusion, and disorientation. The patient frequently forces her  to leave the house, driven by anxiety. It remains uncertain whether this behavior stems from a failure to recognize her ,  irritability, or worsening semantic aphasia. Notably, the patient's compliance with medication is declining; she often conceals or outright refuses certain medications. When she rejects her sleep aids, her insomnia worsens, leading to increased irritability the following day. The patient's her  reports being regularly expelled from their home. During these episodes, he finds temporary refuge nearby, often having to wait hours before returning when the patient becomes calm. Occasionally, the patient mistakenly expects a visit from her  mother, prompting her  to discreetly remove himself from her presence. The house is equipped with cameras around the entrances, alerting the  if the patient attempts to leave. This constant vigilance affects his sleep, as he fears she might wander off. He estimates being forced to leave the house approximately once a week. During these episodes, rational communication with the patient is futile; he simply waits for them to subside. The patient lacks insight into her dementia condition. The  acknowledges the need for additional help but faces challenges due to the patient's paranoia and distrust of outsiders, whom she suspects of theft. He expresses uncertainty about his capacity to continue managing her care and is unsure of what his breaking point might be. We discussed adjusting medications for cognitive impairment. It is recommended to increase Seroquel dosage to 50 mg by crushing the tablet for evening administration. The patient's inconsistent use of zolpidem may necessitate a switch to a spray form, which can be administered orally or potentially added to liquids. A follow-up telephone appointment in two weeks is recommended to monitor medication adherence and consider initiating treatments for agitation and disruptive behavior.  Unresolved Concern(s) reported by patient/family:  Semantic aphasia  Opposition to care     Review of cognitive,  visuospatial, motor, sensory, and behavioral systems:     Memory:   The patient's memory has worsened in the past few years.  She does repeat statements or asks the same question repeatedly.  She does have difficulty remembering recent important conversations.  She does not have difficulty remembering recent events.  She does forget information within minutes.  Her recent retrograde memory is impaired.  Her remote memory is intact.  Attention:   The patient's attention and concentration are impaired.  She does not have attentional fluctuations.  She does have difficulty with selective attention.  She does become easily distracted.  She does have difficulty with divided attention.  Executive:   The patient's cognitive processing speed is slower.  She does have difficulty with working memory.  She does misplace personal items (e.g., keys, cell phone, wallet) more frequently.  She does have difficulty keeping track of her medications.  She does have difficulty with planning/organizing/completing multistep tasks.  She does have difficulty with executive attention.  She does not have difficulty with flexible thinking.  She does not difficulty with self control.  She is exhibiting new symptoms that suggest they have become more impulsive, rash and/or careless.  Her judgment is impaired.  Language:   The patient's speech is affected.  She does forget people's names more frequently.  She does have word-finding difficulties.  Her speech is fluent and non-effortful.  Her speech is not grammatically intact.  She does not make word substitutions.  She does not have difficulty reading.  She appears to have impaired comprehension.  Visuospatial:   The patient has new visuospatial problems.  She has become confused or disoriented in *new*, unfamiliar places.  She does not have trouble with navigation.  She does not get lost in familiar places.  She does not have visuospatial disorientation.  She does not have difficulty  recognizing objects or faces.  Motor/Coordination:   The patient does have difficulty with walking.  She does feel imbalanced.  She denies having fallen.  She does not appear to have new muscle weakness.  She does have difficulty buttoning shirts, operating zippers, or manipulating tools/utensils.  Her handwriting has not become micrographic.  She does have a resting tremor.  She denies having any new involuntary movements and/or muscle jerking.  She does not have swallowing difficulty.  She denies new muscle cramps and twitching.  Sensory:   The patient denies new numbness, tingling, paresthesias, or pain.  The patient denies a loss of vision, blurry vision, or double vision.  The patient denies new loss of hearing or worsening tinnitus.  The patient denies anosmia.  Sleep:   The patient reports difficulty sleeping.  The patient does not snore or have witnessed apneas while sleeping.  When she wakes up in the morning, she does feel well-rested.  She denies dream-enactment behavior.  She denies symptoms suggestive of restless leg syndrome.  Behavior:   The patient's personality has changed. Comment: exaggeration of baseline  She does have symptoms of disinhibition and social inappropriateness. Comment: exaggeration of baseline  She is exhibiting symptoms to suggest a loss of manners and/or decorum. Comment: exaggeration of baseline  She does not appear apathetic or has decreased motivation.  She does not appear to have a change in inertia.  The patient's emotional expression has changed. Comment: exaggeration of baseline  She does have emotional blunting or lability. Comment: exaggeration of baseline  She does have symptoms of irritability and mood lability.  She does not have symptoms of agitation, aggression, or violent outbursts.  Her insight into his disease and situation is impaired.  Her personal hygiene is intact.  She is not exhibiting a diminished response to other people's needs and feelings.  She is  exhibiting diminished social interest, interrelatedness, and/or personal warmth.  She does appear restless.  She denies new and/or worsening simple repetitive behaviors.  Her speech has become simplified and/or repetitive/stereotyped.  She reports symptoms that are suggestive of new/worsening complex repetitive/ritualistic compulsions and behaviors.  She does not have symptoms of hyper-religiosity or dogmatism.  Her interests/pleasures have not become restrictive, simplified, interrupting, or repetitive.  She denies a change of self-stimulating behavior.  She denies any changes in eating behavior.  She denies increased consumption of food or substances.  She denies oral exploration or consumption of inedible objects.  Psychiatric:   She does not feel depressed.  She is exhibiting symptoms of social withdrawal/indifference.  She denies anxiety.  She does not exhibit cycling behavior.  She does exhibit hyperactive behavior.  She is exhibited symptoms of paranoia.  She does not have delusions.  She does not have hallucinations. Comment: possible? Unclear if language impairment and not able to vocalize her thoughts accurately or actual hallucination  She does not have a history of sensitivity to neuroleptic/psychotropic medications.  Medical Review of Systems:   The patient does not have constipation.  The patient does not have urinary incontinence.  The patient denies orthostatic lightheadedness.  The patient's weight is stable.  Functional status:  Difficulty performing the following Instrumental ADLs:  Household chores: Yes  Food Preparation: No  Shopping: Yes  Ability to Handle Finances: Yes  Transportation/Driving: Yes  Household Appliances/Stove: Yes  Laundry: Yes  Difficulty performing the following Basic ADLs:  Dressing: No  Bathing: No  Toileting: No  Personal hygiene and grooming: No  Feeding: No  Care Management:  Patient/Family Safety Concerns:  Medication Adherence: No  Home Safety: No  Wandered:  No  Firearms: No  Fall Risk: No  Home Alone: No        Past Medical History:   Diagnosis Date    Dementia     Fibromyalgia     Hypertension        Past Surgical History:   Procedure Laterality Date    Block-nerve-medial branch-lumbar---BILATERAL L2, L3, L4 - Bilateral Bilateral 11/09/2023    Block-nerve-medial branch-lumbar---BILATERAL L2, L3, L4 - Bilateral  12/14/2023    CAUDAL EPIDURAL STEROID INJECTION  08/24/2023    CAUDAL EPIDURAL STEROID INJECTION N/A 08/24/2023    Procedure: Injection-steroid-epidural-caudal;  Surgeon: Deep Ortega Jr., MD;  Location: [facility];  Service: Pain Management;  Laterality: N/A;    HYSTERECTOMY      INJECTION OF ANESTHETIC AGENT AROUND MEDIAL BRANCH NERVES INNERVATING LUMBAR FACET JOINT Bilateral 11/09/2023    Procedure: Block-nerve-medial branch-lumbar---BILATERAL L2, L3, L4;  Surgeon: Deep Ortega Jr., MD;  Location: [facility];  Service: Pain Management;  Laterality: Bilateral;       Family History   Problem Relation Age of Onset    Heart disease Mother        Social History     Socioeconomic History    Marital status:    Tobacco Use    Smoking status: Never    Smokeless tobacco: Never   Substance and Sexual Activity    Alcohol use: No    Drug use: No    Sexual activity: Yes     Partners: Male       Medication:     Current Outpatient Medications on File Prior to Visit   Medication Sig Dispense Refill    clonazePAM (KLONOPIN) 0.5 MG disintegrating tablet Take 1 tablet (0.5 mg total) by mouth daily as needed (cramping pain). 10 tablet 0    cyanocobalamin, vitamin B-12, 5,000 mcg Subl Place one under tongue once a day for 1 month, then continue to take under tongue per week 30 tablet 3    ezetimibe (ZETIA) 10 mg tablet Take 10 mg by mouth once daily.      gabapentin (NEURONTIN) 300 MG capsule TAKE 1 CAPSULE(300 MG) BY MOUTH THREE TIMES DAILY 90 capsule 2    guaiFENesin 100 mg/5 ml (ROBITUSSIN) 100 mg/5 mL syrup Take 200 mg by mouth 3 (three) times  daily as needed for Cough.      magnesium oxide (MAG-OX) 400 mg (241.3 mg magnesium) tablet Take 400 mg by mouth once daily.      meloxicam (MOBIC) 7.5 MG tablet Take 1 tablet (7.5 mg total) by mouth daily as needed for Pain. 30 tablet 2    NIFEdipine (PROCARDIA-XL) 60 MG (OSM) 24 hr tablet Take 1 tablet (60 mg total) by mouth once daily. 90 tablet 1    pyridoxine, vitamin B6, (B-6) 100 MG Tab Take 3 tablets twice a day for 1 month, then 2 tablets twice a day for 1 month, then continue 1 tablet twice a day 180 tablet 1    tiZANidine (ZANAFLEX) 2 MG tablet Take 0.5-1 tablets (1-2 mg total) by mouth every 8 (eight) hours as needed (back pain). 90 tablet 2    [DISCONTINUED] QUEtiapine (SEROQUEL) 25 MG Tab TAKE ONE-HALF (1/2) TABLET EVERY EVENING 90 tablet 3    [DISCONTINUED] zolpidem (AMBIEN) 5 MG Tab Take 5 mg by mouth nightly as needed.       Current Facility-Administered Medications on File Prior to Visit   Medication Dose Route Frequency Provider Last Rate Last Admin    [DISCONTINUED] BUPivacaine (PF) 0.25% (2.5 mg/ml) (MARCAINE) 0.25 % (2.5 mg/mL) injection             [DISCONTINUED] BUPivacaine (PF) 0.25% (2.5 mg/ml) injection 25 mg  10 mL Intramuscular Once Deep Ortega Jr., MD        [DISCONTINUED] BUPivacaine (PF) 0.25% (2.5 mg/ml) injection    PRN Deep Ortega Jr., MD   10 mL at 12/14/23 1343    [DISCONTINUED] LIDOcaine HCL 10 mg/ml (1%) 10 mg/mL (1 %) injection             [DISCONTINUED] LIDOcaine HCL 10 mg/ml (1%) injection 20 mL  20 mL Intradermal Once Deep Ortega Jr., MD        [DISCONTINUED] LIDOcaine HCL 10 mg/ml (1%) injection    PRN Deep Ortega Jr., MD   10 mL at 12/14/23 1343        Review of patient's allergies indicates:  No Known Allergies    Medications Reconciliation:   I have reconciled the patient's home medications and discharge medications with the patient/family. I have updated all changes.  Refer to After-Visit Medication List.    Objective:  Vital  Signs:  There were no vitals filed for this visit.  Wt Readings from Last 3 Encounters:   12/14/23 1300 66.7 kg (147 lb 2.5 oz)   11/27/23 1724 68 kg (150 lb)   11/14/23 0912 68.2 kg (150 lb 5.7 oz)   11/09/23 1228 68.2 kg (150 lb 3.9 oz)   11/03/23 1457 64.4 kg (142 lb)   11/03/23 1454 64.4 kg (142 lb)     There is no height or weight on file to calculate BMI.           Neurological examination:  Mental Status:   Her appearance deviates from typical expectations given their age and context.  Throughout the interview, she behaved abnormally and was not cooperative.  The patient's energy level is abnormal.  Her orientation is not entirely accurate.  Her attention/concentration is impaired.  She can complete three-step commands.  Her thought process is not logical or goal-oriented.  She demonstrated impaired insight based on actions, awareness of her illness, plans for the future.  She demonstrated impaired judgment based on actions and plans for the future.  She has no evidence of hallucinations (auditory, visual, olfactory).  She has no evidence of delusions (paranoid, grandiose, bizarre).  Cranial Nerves:   Her pupils were normal.  Her visual fields were full to confrontation in all quadrants.  Her ocular pursuit in the horizontal and vertical plane was complete.  Her saccadic initiation, velocity, and amplitude are normal.  Her facial strength was normal.  Her facial expression was symmetric and appropriate to the context.  Her facial sensation was intact to light touch bilaterally.  Her hearing was normal bilaterally.  Her tongue showed no evidence of scalloping.  She tongue movement with normal.  Speech/Language:   The patient's speech was not completely fluent and non-effortful.  Her speech timbre is abnormal. Comment: loud; loud  Her speech rate is abnormal. Comment: fast; fast  She has no articulation (segmental features) errors.  She has no speech dysdiadochokinesia with repetition of syllables such as  ""/PA/, /TA/, /KA/, /OM/".  She made no errors during the repetition of rapid syllables and or words such as "caterpillar" "", and "huckleberry"  She has no repetition errors of rapid sequences of consonants, such as in "Hinduism Confucianism" or "[place] [employer]".  She has no prosody (suprasegmental features) errors.  Her stress assessment showed no repetition errors in linguistically complex words, including multisyllabic words ("planetarium," "questionable," "accomplishment," "phonetic.  The patient's speech is not dysarthric.  She does not have hyperkinetic dysarthria.  The patient showed evidence of anomia.  She showed evidence of anomia during spontaneous speech.  She showed evidence of anomia during confrontational naming.  She makes no phonological loop errors.  She makes no errors during the repetition of gibberish words (e.g., "Supercalifragilisticexpialidocious," "Pigglywiggly," "Woospiedoo," "Zowzy," "Bazinga").  She makes no errors during the repetition of complex meaningless phrases (e.g., "The horse raced past the barn fell.", "The complex houses  and single soldiers and their families," "Wishes are hopping, and trees are west," and "Brushing liked to cale gricelda's direction").  She can comprehend commands that cross the midline (e.g., with your left thumb, touch your right ear).  She has difficulty comprehending commands that depend on syntax.  Her speech is not grammatically intact.  She makes superordinate errors when asked to draw an animal.  Motor:   The patient's bilateral upper extremity muscle bulk is appropriate.  The patient's upper extremity muscle tone is increased. Comment: Unable to relax, b/l paratonia R>L cant assess for rigidity ; Unable to relax, b/l paratonia R>L cant assess for rigidity  The patient's bilateral upper extremity muscle tone does not suggest spasticity.  There is evidence of paratonia. Comment: Muscle tone is increased and there is evidence of " paratonia.; Muscle tone is increased and there is evidence of paratonia.  Assessment of motor strength was symmetric and at minimal anti-gravity.  There is no pronator or downward drift.  There is no myoclonus observed in The patient's bilateral upper and lower extremities.  There are no fasciculations observed in The patient's bilateral upper and lower extremities.  Coordination:   She has no bilateral upper extremity limb dysmetria or past pointing on finger-nose-finger bilaterally.  She has no limb dysdiadochokinesia of the upper extremity on the pronation/supination test and screwing in a light bulb or lower extremity during tapping ball of each foot bilaterally.  She has a visible tremor.  She has no kinetic tremor bilaterally.  She has a postural tremor.  She has no resting tremor bilaterally.  She has evidence of interhemispheric motor control deficits.  She demonstrates evidence of motor overflow.  She has no dyskinetic movements.  The patient's upper extremity fine motor coordination was abnormal. Comment: mild R>L; mild R>L  The patient's bilateral upper extremity coordination with finger tapping, pronation/supination, and the open-close fist showed slowing.  The patient's bilateral upper extremity coordination with finger tapping, pronation/supination, and the open-close fist showed hypometria.  The patient's bilateral upper extremity coordination with finger tapping, pronation/supination, and the open-close fist showed dysrhythmia.  Higher Cortical Function:   The patient showed no evidence of simultanagnosia (Navon hierarchical letters).  She demonstrates no evidence of dorsal simultanagnosia (overlapping objects).  She demonstrates no evidence of ventral simultanagnosia (complex picture synthesis).  The patient showed evidence of visuospatial constructional dysfunction.  The patient showed evidence of angular gyrus disconnection (insular-operculum).  She has evidence of dysgraphia.  The patient showed  evidence of apraxia.  She showed no evidence of ideomotor apraxia performing tool-use pantomimes (e.g., use a hammer, use a screwdriver, use a comb, flip a coin, waving goodbye).  She showed no evidence of ideational apraxia (e.g., taking off and putting on shoes, folding paper into an envelope).  She showed evidence of limb-motor apraxia during mimicking complex bimanual hand shapes.  She showed no evidence of buccofacial apraxia (e.g., blow out a candle, puff out cheeks, and whistle).  She showed dysexecutive behavior.  She showed no utilization or imitation behavior.  She has evidence of perseverative or stereotyped behavior.  She demonstrated stimulus-bound behavior.  Sensory:   Her cortical sensory assessment demonstrated no neglect bilaterally.  She he had no astereognosis (paper clip, ring, dime) bilaterally in the palms.  She he had no agraphesthesia (drawing numbers) in the palms.  Reflexes:   Reflexes were symmetric and 2+ at biceps, 2+ triceps, and 2+ brachioradialis, 2+ at the knees bilaterally, there was no cross-abductor sign, 2+ in the bilateral ankles.  Gait:   She can arise from a chair and sit back down without using their arms.  Her gait was normal.  When attempting to walk abnormally (heels, tiptoes, tandem), she makes no errors.  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  Severe Language Impairment: She scored >3 standard deviations below the norm on at least one measure. She had difficulty with naming, abstract, semantic association, Semantics.  Moderate Visuospatial Impairment: visuospatial construction, simultanagnosia.  Moderate Executive Impairment: She scored >1.5 standard deviations below the norm on at least one measure. She had difficulty with working memory, lexical fluency.  Moderate Attention  Impairment: orientation.  MMSE 17/30: MMSE Score suggestive of moderate cognitive impairment.  MOCA 10/30: MOCA Score suggestive of moderate cognitive impairment.  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     Neurocognitive/Neurobehavioral Evaluation completed on 2023-12-15    Neuropsychiatric/Behavioral Focused Evaluation Assessment    BEHAV5+ 4/6 See ROS section for a full description   Laboratories:     Lab Date Value [Reference]   Autoimmune/Paraneoplastic Screening           NAILA 2022, Jul-20    Negative       CRP 2022, Jul-20    2.5 [0.0 - 8.2 mg/L]      Haptoglobin 2022, Jul-20    159 [30 - 250 mg/dL]      Sed Rate 2022, Jul-20    11 [0 - 36 mm/Hr]      Metabolic Screening   Ferritin 2022, Jul-20    46 [20.0 - 300.0 ng/mL]      Hemoglobin A1C External 07/20/2022  4.9 [4.0 - 5.6 %]      Methlymalonic Acid 07/20/2022  0.21      T4 Total 07/20/2022  7.6 [4.5 - 11.5 ug/dL]      TSH 05/04/2022  1.946 [0.400 - 4.000 uIU/mL]      Glucose 2023, Mar-22    76 [70 - 110 mg/dL]      Iron 2022, Jul-20    55 [30 - 160 ug/dL]      Saturated Iron 2022, Jul-20    14 (L) [20 - 50 %]      TIBC 2022, Jul-20    404 [250 - 450 ug/dL]      Transferrin 2022, Jul-20    273 [200 - 375 mg/dL]      Albumin 2023, Mar-22    4.2 [3.5 - 5.2 g/dL]      Alkaline Phosphatase 2023, Mar-22    97 [55 - 135 U/L]      ALT 2023, Mar-22    15 [10 - 44 U/L]      AST 2023, Mar-22    26 [10 - 40 U/L]      BILIRUBIN TOTAL 2023, Mar-22    0.6 [0.1 - 1.0 mg/dL]      PROTEIN TOTAL 2023, Mar-22    7.9 [6.0 - 8.4 g/dL]      Cholesterol 2022, May-05    189 [120 - 199 mg/dL]      HDL 2022, May-05    52 [40 - 75 mg/dL]      Non-HDL Cholesterol 2022, May-05    137 [mg/dL]      Triglycerides 07/20/2022  75 [30 - 150 mg/dL]      B12 Def. Methylmalonic Acid 07/20/2022  0.20      Folate 05/11/2022  4.5 [4.0 - 24.0 ng/mL]      Thiamine 07/20/2022  44 [38 - 122 ug/L]       Vitamin B-12 05/11/2022  307 [180 - 914 ng/L]      Vitamin E 2022, Jul-20    1610 [500 - 1800 ug/dL]      Cerebrospinal Fluid Assessment   IgG 07/20/2022  1189 [650 - 1600 mg/dL]      Neuroendocrine/Electrolyte Screening   Magnesium 05/04/2022  2.1 [1.6 - 2.6 mg/dL]      BUN 2023, Mar-22    15 [8 - 23 mg/dL]      Chloride 2023, Mar-22    110 [95 - 110 mmol/L]      Creatinine 2023, Mar-22    0.9 [0.5 - 1.4 mg/dL]      Potassium 2023, Mar-22    3.5 [3.5 - 5.1 mmol/L]      Sodium 2023, Mar-22    145 [136 - 145 mmol/L]      Infectious Disease/Immunocompromised Screening   SARS-CoV-2 RNA, Amplification, Qual 2022, May-04    Negative      Hepatitis C Ab 07/20/2022  Negative      HIV 1/2 Ag/Ab 07/20/2022  Negative      Syphilis Treponemal Ab 2022, Jul-20    Nonreactive [Nonreactive]      Standard Hematology Screen   Hematocrit 2022, Jul-20    38.3 [37.0 - 48.5 %]      Hemoglobin 2022, Jul-20    11.9 (L) [12.0 - 16.0 g/dL]      MCV 2022, Jul-20    92 [82 - 98 fL]      Platelets 2022, Jul-20    285 [150 - 450 K/uL]      Toxin/Heavy Metal Screening   Amphetamine Screen, Ur 2022, May-04    Negative      Barbiturate Screen, Ur 2022, May-04    Negative      Benzodiazepines 2022, May-04    Negative      Cocaine (Metab.) 2022, May-04    Negative      Marijuana (THC) Metabolite 2022, May-04    Negative      Methadone metabolites 2022, May-04    Negative      Opiate Scrn, Ur 2022, May-04    Negative      Phencyclidine 2022, May-04    Negative      Delirium Screening   Glucose, UA 2022, May-04    Negative      Ketones, UA 2022, May-04    Negative      Leukocytes, UA 2022, May-04    1+ (A)      NITRITE UA 2022, May-04    Negative      Protein, UA 2022, May-04    Negative      RBC, UA 2022, May-04    2 [0 - 4 /hpf]      WBC, UA 2022, May-04    1 [0 - 5 /hpf]           Neuroimaging:    MRI brain/head without contrast on 5/4/2022  Formal interpretation by Radiology:  Please note image quality is mildly degraded by patient motion  artifact, most notably postcontrast images. There is no abnormal restricted diffusion to suggest acute infarction. There is generalized cerebral volume loss with compensatory prominence of the extra-axial spaces, cerebral sulci and ventricular system. The ventricles demonstrate no evidence of hydrocephalus or midline shift. Minimal periventricular T2/FLAIR hyperintensity which is nonspecific but likely reflect sequela of chronic microvascular ischemic change. There are no abnormal areas of gradient susceptibility to suggest parenchymal hemorrhage. No extra-axial hemorrhage or fluid collections. Following the administration of IV contrast, allowing for motion artifact, there are no definite pathologic foci of enhancement. The craniocervical junction and sellar regions are within normal limits.  Independently reviewed radiological imaging by Jaison Jackson MD. MPH. Behavioral Neurologist  T1: Mild-to-moderate frontal temporal lobar degeneration with regional atrophy most well appreciated in the anterior cortices dorsal greater than ventral with thinning of the anterior cingulate sulci, atrophy of the medial prefrontal dorsal lateral prefrontal relatively mild atrophy of the orbital frontal cortex. Most atrophy appears to be the dorsal midline greater than lateral prefrontal cortex. In addition there is right greater than left anterior temporal atrophy with relative sparing of hippocampi however still mild bilateral hippocampal it atrophy. No significant posterior cortical atrophy. Generalized thinning of the anterior cingulate with sparing of the mid brain and pontine region.  T2/FLAIR: No Significant hyperintensities appreciated on MRI T2/FLAIR  DWI/ADC: No Significant DWI hyperintensities/hypointensities. No ADC correlation.  SWI/GRE: No Significant hypointensities to suggest cortical/subcortical hemosiderin deposition.  Impression: : Bilateral prefrontal cortical atrophy and right greater than left anterior  temporal atrophy consistent with frontal temporal lobe degeneration. Sparing of the brainstem. No significant subcortical white matter disease. No significant posterior cortical atrophy.     Procedures:    Electrocardiogram on 5/4/2022  Formal interpretation:  Vent. Rate : 071 BPM     Atrial Rate : 071 BPM    P-R Int : 166 ms          QRS Dur : 068 ms     QT Int : 384 ms       P-R-T Axes : 068 011 045 degrees    QTc Int : 417 ms Normal sinus rhythm Low voltage QRS Abnormal ECG  Independently reviewed Electrocardiogram by Jaison Jackson MD. MPH. Behavioral Neurologist  Impression: : Received ECG has no evidence of sinus node disease. HR (>=50-60). Prolonged NC interval (>0.22 s). Broad QRS complex (> 0.12 s).     Clinical Summary:     The patient is a 78-year-old right-handed female with a relevant past medical history of HTN with encephalopathy, fibromyalgia, who presents reporting a 3-year history of gradually progressive neurocognitive impairment.       The clinical history is suggestive of:  Memory Impairment: STM encoding impairment, LTM encoding-retrieval impairment, Amnesia of fixation  Attention Impairment: Attention, Selective attention, Sustained attention, Shifting attention  Executive Impairment: Energization, Working Memory, Set-Shifting, Response Inhibition  Language Impairment: Language Dysfunction, Grammar Dysfunction, Receptive Dysfunction  Visuospatial Impairment: Allocentric Spatial Processing  Motor/Coordination Impairment: Sensory motor integration, Central pattern generators dysfunction  Behavior Impairment: Emotional Regulation, Self-Preservation Dysregulation, Social Coherence, Sensorimotor Dysregulation  Psychiatric Impairment: Stimulation Dysregulation, Paranoia  iADL Impairment: Clarinda Instrumental Activities of Daily Living Scale  The neurological examination is significant for:  Cortical Frontal Dysfunction: non-fluent aphasia (fluency), agrammatic aphasia (syntax comprehension,  substitutions)  Cortical Frontal-Parietal Disconnection: apraxia (limb-motor)  Cortical Temporal Dysfunction: anomic aphasia (spontaneous, confrontational), semantic aphasia (superordinate errors)  Cortical Temporal-Parietal Dysfunction: dysgraphia  Cortical Transcallosal Disconnection: interhemispheric motor control (interhemispheric motor control ), motor efference (motor overflow)  Executive Impairment: thought disorder, judgment, dysexecutive behavior (perseverative/stereotyped, stimulus-bound)  Movement Disorder (Hyperkinetic): tremor (postural)  Movement Disorder (Hypokinetic): paratonia (tone), parkinsonism (bradykinesia)  Movement Disorder (Speech): abnormal vocal features (volume, rate)  The neurocognitive battery is significant (based on age and education) for:  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 Sparing of the brainstem. No significant subcortical white matter disease. No significant posterior cortical atrophy.        Assessment:        The patient's clinical presentation is behavior/psychiatric predominant major cognitive impairment (moderate dementia) sufficient to impair activities of daily living (CDR-SOB: 8 , [place] iADL: 7/8 - Mild Dementia).     The patient's clinical presentation meets the criteria for Dementia (Xavier, et al. 2011 Alzheimer's & Dementia).     Concern regarding an intraindividual change in cognition diagnosed through a combination of history and objective cognitive assessment  Impairment in two or more cognitive domains  Interference with independence in functional abilities.  Represents a decline from previous levels of functioning.  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  Early perseverative, stereotyped, or compulsive/ritualistic behavior: simple repetitive movements, complex, compulsive or ritualistic behaviors, stereotypy of speech.  Frontal and/or anterior temporal atrophy on MRI or CT  Impaired confrontation naming.  Impaired single-word comprehension.  Impaired object knowledge, particularly for low-frequency or low-familiarity items.  Surface dyslexia or dysgraphia.  Spared repetition.  Spared speech production (grammar and motor speech).  Predominant anterior temporal lobe atrophy.     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 There is however reported symptoms of possible hallucinations. Patients  has never seen her respond to sounds/sights that were no there as such it is unclear whether 'hallucinations' are actually an inability to articulate herself or actual sensory hallucinations.   At present, all neurodegenerative diseases can only be diagnosed with 100% certainty through a brain autopsy. The suspected neuropathology underlying the patient's neurocognitive impairment is likely a mixture of pathologies (Alzheimer's Disease Related Pathology, TAR DNA-binding protein 43).  There are no plasma protein biomarkers available on record.  There are no CSF protein biomarkers available on record.  There are no dermatological protein biomarkers available on record.  There is no known relevant genetic testing available.        The observations made above, were discussed with the patient and their supporting historian(s) (). We have  discussed the additional diagnostic(s) and/or managenent below.     Care Management Plan:    #Optimize Neurocognitive Impairment and Quality  We have discussed the MIND Diet and other lifestyle behavior that may help maintain brain health.  We have provided written/digital reading material  Continue b12 5000 mcg weekly  We discussed diagnosed with bvFTD - We discussed the patient would likely not be were candidate for disease modifying therapy for Alzheimers. We discussed additional diagnostic testing that would confirm Alzheimers. Patient is not interested in a lumbar puncture. Patient is not interested in blood work. Follow up as needed  #Optimize Behavioral Management and Quality.  No indication for memantine at this time  Continue gabapentin 300 mg TID  #Optimize Cerebrovascular Health.  The patient has a documented history of hyperlipidemia and/or hypercholesteremia with long-term complications such as cerebrovascular disease, peripheral vascular disease, and/or aortic atherosclerosis. Collectively these risk factors may contribute to cerebral atherosclerosis, and cerebral hypoperfusion compounded neurocognitive disorder. We discussed maximizing cerebrovascular-related medical therapy, including but not limited to cholesterol medications and antiplatelet agents. We have discussed the value of aggressively controlling vascular risk factors like hypertension, hyperlipidemia, and Diabetes SBP<130, LDL<100, and A1C<7.0. We discussed the need to optimize lifestyle choices, including a heart-healthy diet (e.g., Mediterranean or DASH), increased cardiovascular exercise (goal 150 minutes of moderate-intensity per week), and staying cognitively and socially active.  #Optimize Fibromyalgia Disorder and Quality.  Continue gabapentin 300 mg TID  Continue meloxicam  f/u rheumatology  Continue clonazepam 0.5 mg p.r.n. for leg cramps  Continue vitamin B6 100 mg TID  #Optimize Sleep Hygiene and Quality  We discussed and  "recommended additional diagnostic/management of sleep disorder to optimize brain health and longevity.  increase Seroquel to 50 mg q.h.s.. May crush  Continue Ambien however switched to spray 5 mg q.h.s.  #Behavioral/Environmental Strategies  We recommend engaging in activities that stimulate cognitively and socially while avoiding excessive stimulation and fatigue in overwhelmingly complex situations.  We recommend integrating routine and schedule into your daily life. https://www.alzheimersproject.org/news/the-importance-of-routine-and-familiarity-to-persons-with-dementia/  #Health Maintenance/Lifestyle Advice  We have discussed the value in aggressively controlling vascular risk factors like hypertension, hyperlipidemia, and Diabetes SBP<130, LDL<100, A1C<7.0.  We discussed the need to optimize lifestyle choices including a heart-healthy diet (e.g., Mediterranean or DASH), increased cardiovascular exercise (goal 150 minutes of moderate-intensity per week), and stay cognitively and socially active.  #Support  We all need support sometimes. Get easy access to local resources, community programs, and services. https://www.communityresourcefinder.org/  Learn more about Cognitive Impairment in [place]: https://www.alz.org/professionals/public-health/state-overview[place]  #Safety  [place] has no laws against driving with dementia specifically but obviously has laws about medical conditions which impact a person's ability to drive safely. If you believe your loved one's driving capacity has diminished, please reach out to either your primary care physician or our office to discuss driving restrictions or revocation of their license. To learn more: https://www.dementiacarecentral.com/caregiverinfo/driving-problems/  The [employer] administers the nationwide "Safe Return" program with identification bracelets, necklaces, or clothing tags and 24-hour assistance. More information is available online at " https://www.alz.org/help-support/caregiving/safety/medicalert-with-24-7-wandering-support  #Follow up:  Follow-up as needed.    Thank you for allowing us to participate in the care of your patient. Please do not hesitate to contact us with any questions or concerns.     It was a pleasure seeing The patient and we look forward to seeing them at their follow-up visit.     This note is dictated on M*Modal Fluency Direct word recognition program. There are word recognition mistakes that are occasionally missed on review.       Scheduled Follow-up :  No future appointments.    After Visit Medication List :     Medication List            Accurate as of December 15, 2023 10:56 AM. If you have any questions, ask your nurse or doctor.                START taking these medications      zolpidem 5 mg/spray (0.1 mL) Spry  Take 1 spray by mouth every evening.  Replaces: zolpidem 5 MG Tab  Started by: Jaison Javier MD            CHANGE how you take these medications      QUEtiapine 25 MG Tab  Commonly known as: SEROQUEL  Take 2 tablets (50 mg total) by mouth every evening.  What changed:   how much to take  how to take this  when to take this  additional instructions  Changed by: Jaison Javier MD            CONTINUE taking these medications      clonazePAM 0.5 MG disintegrating tablet  Commonly known as: KlonoPIN  Take 1 tablet (0.5 mg total) by mouth daily as needed (cramping pain).     cyanocobalamin (vitamin B-12) 5,000 mcg Subl  Place one under tongue once a day for 1 month, then continue to take under tongue per week     ezetimibe 10 mg tablet  Commonly known as: ZETIA     gabapentin 300 MG capsule  Commonly known as: NEURONTIN  TAKE 1 CAPSULE(300 MG) BY MOUTH THREE TIMES DAILY     guaiFENesin 100 mg/5 ml 100 mg/5 mL syrup  Commonly known as: ROBITUSSIN     magnesium oxide 400 mg (241.3 mg magnesium) tablet  Commonly known as: MAG-OX     meloxicam 7.5 MG tablet  Commonly known as: MOBIC  Take 1 tablet (7.5 mg total) by mouth  daily as needed for Pain.     NIFEdipine 60 MG (OSM) 24 hr tablet  Commonly known as: PROCARDIA-XL  Take 1 tablet (60 mg total) by mouth once daily.     pyridoxine (vitamin B6) 100 MG Tab  Commonly known as: B-6  Take 3 tablets twice a day for 1 month, then 2 tablets twice a day for 1 month, then continue 1 tablet twice a day     tiZANidine 2 MG tablet  Commonly known as: ZANAFLEX  Take 0.5-1 tablets (1-2 mg total) by mouth every 8 (eight) hours as needed (back pain).            STOP taking these medications      zolpidem 5 MG Tab  Commonly known as: AMBIEN  Replaced by: zolpidem 5 mg/spray (0.1 mL) Spry  Stopped by: Jaison Javier MD               Where to Get Your Medications        These medications were sent to [facility][id number] [address] AT [address] [address]      Phone: [phone]   QUEtiapine 25 MG Tab  zolpidem 5 mg/spray (0.1 mL) Spry         Signing Physician:  Jaison Javier MD    Billing:        -----------------------------------------------------------------------------    I performed this consultation using real-time Telehealth tools, including a live video connection between my location and the patient's location (their home within the [facility]). Prior to initiating the consultation, I obtained informed verbal consent to perform this consultation using Telehealth tools and answered all the questions about the Telehealth interaction. The participants understand that only a limited neurological exam and limited neuropsychological testing can be performed using Telehealth tools.    I spent a total of 45 minutes (time-in: 09:30 AM; time-out: 10:15 AM) on 2023-12-15, virtually face-to-face with the patient and caregiver(s), >50% of that time was spent counseling regarding the symptoms, treatment plan, risks, therapeutic options, lifestyle modifications, and/or safety issues for the diagnoses  above.    10/14 Review of Systems completed and is negative except as stated above in HPI (Systems reviewed: Const, Eyes, ENT, Resp, CV, GI, , MSK, Skin, Neuro)    I reviewed the summation of records from outside physicians for a total of 10 minutes on 2023-12-15. Reviewed and summation of records from an outside physician was performed as summarized above in HPI    Discussion with an outside physician was performed    I performed a neurobehavioral status examination that included a clinical assessment of thinking, reasoning, and judgment to ensure a comprehensive approach in managing the complex and evolving needs of the patient's neurocognitive condition. Please see above HPI and ROS for full details. This exam was performed on 2023-12-15 and included 11 minutes spent on direct face-to-face clinical observation and interview with the patient and 20 minutes spent interpreting test results and preparing the report. The total time of 31 minutes spent on the neurobehavioral status examination is not included in the time spent on evaluation and management coding.    Total Billing time spent on encounter/documentation for this patient's evaluation and management, not including the neurobehavioral status examination: 44 minutes.

## 2023-12-15 NOTE — TELEPHONE ENCOUNTER
"SW called patient's spouse to discuss care management concerns.     Patient shares that he will often get kicked out of the house by the patient. He ends up having to walk down the block, or go to the front house. Sometimes when he leaves and comes back she recognizes him. Sometimes it can take hours before he can return and she is calm.   There are times that she thinks her mother is coming to visit, and she'll tell him he has to leave. He will try to get up and "disappear." For example, he will sometimes sleep on the couch to assuage her.    Patient's house has cameras all around the entrances. If she walks out the door,  will be notified. He has difficulty sleeping, because he worries she will wander. He tends to go in the front of her house whenever she kicks him out so he can know if she tries to leave. He estimates that he is kicked out once a week. There are times when nothing works; he can't reason with her. He just waits until her episode passes. She doesn't have insight into her dementia.    He recognizes help is needed, but he states she isn't going to let anyone in the house. She is paranoid and suspects people stealing her belongings. He recognizes that there is going to be a point when he isn't going to be able to handle her care.  SW asked what that point will look like when he won't be able to handle her care anymore. He states that he has a good wall, that he can handle what's happening now, but he isn't sure what's going to be the breaking point.    Support is Lacking   He has told all of his neighbors about her condition; they have agreed to contact him if they see her alone. She has no family. She had a friend from [place] who visited, but patient couldn't remember who she was. 's children live out of state.     Her activities include watching tv and going out to eat. She is mobile. She loves music, he'll play music for her, he'll take her to music shows. He states that she is " starting to do strange things; she tried to put finger nail polish on her lips. She used a hairbrush to brush her teeth. SW discussed putting away any potential hazards;  states he is getting rid of nail polish.     His main concern is planning for the point of needing help of people coming in. SU and patient's  discussed HH, care sitters... He does not know how patient will cooperate with strangers coming in the home. He toured one of the senior living centers, but it was 72K per year. He feels like keeping her home for as long as safely possible. He recognizes in 2-3 years, that he might need more help.    He states most of the time he is able to get her to take medications. But sometimes she's ornery. She will wrap pills in tissues and hide them, so he inquired about liquid medications during appt.     Calming her down and getting her to sleep is the only way he can get breaks. SW inquired what helps him recover. He states since it's been colder, he's been staying inside more, watching tv on the couch. He tries to stay within her range, because she can get worried about him and starts wandering.     He inquired if there's financial support for dementia. SU informed him of lack of resources. SU discussed [facility] Financial Assistance Program, which can be utilized if you have been denied from Medicaid. He is going to try to get THC gummies to help her. He's going to discuss with PA and pain management.    He has a goal to make her feel as comfortable as possible and address her other medical needs, including pain.     He has no other concerns at this time. SU provided contact information and encouraged him to reach out for support.

## 2023-12-21 DIAGNOSIS — G47.00 INSOMNIA, UNSPECIFIED TYPE: Primary | ICD-10-CM

## 2023-12-21 NOTE — TELEPHONE ENCOUNTER
"I received a fax from the pharmacy that said :    "Spray???" In response to our ambien order    Our last ambien order's sig was:  zolpidem 5 mg/spray (0.1 mL) Spry 1 each 2 12/15/2023 -- No   Sig - Route: Take 1 spray by mouth every evening. - Oral   Sent to pharmacy as: zolpidem 5 mg/spray (0.1 mL) Spry   Class: Normal       Routed to provider to advise    "

## 2023-12-21 NOTE — TELEPHONE ENCOUNTER
Called pharmacy to see what the issue is  Waiting on line for ten minutes unable to get anyone on the phone    Called pt to see if she was able to pick it up  Spoke w/   He said that they got the seroquel but the pharmacy couldn't fill the ambien bc they said the order was wrong      Tried calling pharmacy again  Waited on line again with two callers ahead of me for a while with no change    Routing to Dr mo to see if pill form is option, or if spray is necessary

## 2023-12-21 NOTE — TELEPHONE ENCOUNTER
Jaison Javier MD  You15 minutes ago (3:34 PM)     JR  Yes, its a spray, what's the issue?     You  Jaison Javier MD40 minutes ago (3:08 PM)     AT  [facility] see note and and advise  Thanks!

## 2023-12-28 ENCOUNTER — TELEPHONE (OUTPATIENT)
Dept: NEUROLOGY | Facility: CLINIC | Age: 78
End: 2023-12-28
Payer: MEDICARE

## 2023-12-28 NOTE — TELEPHONE ENCOUNTER
Called [facility]  They said they don't carry it, and it wouln't be at any specialty pharmacy, just regular commercial pharmacies.   For instance, the main campus pharmacy would be the ones who supply it, they just cannot get it at this time.

## 2023-12-28 NOTE — TELEPHONE ENCOUNTER
----- Message from Jaison Javier MD sent at 12/28/2023  2:25 PM CST -----  Regarding: FW: Zolpimist 5mg spray  That's a bust - can u call down to pharmacy and see if they know any speciality pharmacies that might be able to help - if not will need to try something else    ----- Message -----  From: Nay Morocho, PharmD  Sent: 12/28/2023   2:25 PM CST  To: Jaison Javier MD  Subject: Zolpimist 5mg spray                              Good Afternoon Dr. Javier    Unfortunately Zolpimist 5mg spray is unavailable to order from our suppliers at this time. We will not be able to fill this medication for this patient at [facility] pharmacy. Thank you

## 2023-12-29 ENCOUNTER — TELEPHONE (OUTPATIENT)
Dept: PAIN MEDICINE | Facility: CLINIC | Age: 78
End: 2023-12-29
Payer: MEDICARE

## 2023-12-29 NOTE — TELEPHONE ENCOUNTER
Spoke with patient's . Confirmed patient pain was 9/10 prior to the procedure. Pain was 2/10 during the effect of the block then returned to baseline prior to the procedure. No further issues discussed.

## 2024-01-02 ENCOUNTER — OFFICE VISIT (OUTPATIENT)
Dept: NEUROLOGY | Facility: CLINIC | Age: 79
End: 2024-01-02
Payer: MEDICARE

## 2024-01-02 DIAGNOSIS — G31.09 BEHAVIORAL VARIANT FRONTOTEMPORAL DEMENTIA: Primary | ICD-10-CM

## 2024-01-02 DIAGNOSIS — F02.818 BEHAVIORAL VARIANT FRONTOTEMPORAL DEMENTIA: Primary | ICD-10-CM

## 2024-01-02 PROCEDURE — 99443 PR PHYSICIAN TELEPHONE EVALUATION 21-30 MIN: CPT | Mod: 95,,, | Performed by: PSYCHIATRY & NEUROLOGY

## 2024-01-02 NOTE — PROGRESS NOTES
[facility]     PATIENT: Mamta Bates  VISIT DATE: 2024  MRN: [id number]  PRIMARY PROVIDER: Ibeth Harris MD  : 1945       -----------------------------------------------------------------------------  I reviewed documents/diagnostics/laboratory/imaging records and communicated with the patient's family on 2024. This is directly related to a face-to-face visit encounter with the patient (Evaluation and Management service) conducted on 12/15/2023.  An Established-Patient Audio Only Telehealth Visit was requested by the provider with the family in regards to the workup performed between the patient's last clinical encounter on 12/15/2023 and 2024. A total of 45 minutes was spent with the family and/or patient via audio-only telemedicine discussing the patient's case from 11:45 AM until 12:30 PM on 2024. The reason for the audio-only service rather than synchronous audio and video virtual visit was related to technical difficulties or patient preference/necessity.  The patient's location is: Home  The chief complaint leading to consultation is: FTD  Visit type: Virtual visit with audio only (telephone)  Each patient to whom I provide medical services by telemedicine is: (1) informed of the relationship between the physician and patient and the respective role of any other health care provider with respect to management of the patient; and (2) notified that they may decline to receive medical services by telemedicine and may withdraw from such care at any time. Patient verbally consented to receive this service via voice-only telephone call.  I reviewed the following documentation for a total of 0 minutes on 2024.  A total amount of 45 minutes on 2024 was spent on documentation and communication. The CPT code(s) for billing for prolonged evaluation and management service (non-face-to-face review of records and/or  communications with patient's family or other medical professionals):  [id number]  -----------------------------------------------------------------------------    Chief complaint: Progressive Cognitive Impairment     History of present illness:      The patient is a 78-year-old right-handed female who presents today to the [facility] due to concerns related to Progressive Cognitive Impairment.  The patient is accompanied by the  who participates in providing history.  Additional information is obtained by reviewing available medical records.     Relevant Background/Context  Known Relevant Family history:  Mother  of 74 y/o CAD  Father  in NH old age (mid-75) unknown issues, possible NCD  Sister - DM2  Sister in wheelchair  60s 'fell off'  Neurocognitive Disorder:  The family denies a history of early/late onset cognitive impairment.  Movement Disorder:  The family denies a history of movement disorder (PD, PDD, tremor, etc).  Motorneuron Disorder:  The family denies a history of motor neuron disease (ALS).  Developmental Disorder:  The family denies a history of developmental learning disorder (Dyslexia, ADHD, ASD, etc.).  Psychiatric Disorder:  Sister - MDD related to LE dysfunction  Known Relevant Genetics:  There is no known relevant genetic testing available.  Developmental Milestones:  The patient/family report no known birth complications or early life problems. The patient met all developmental milestones.  Education/Learning Capacity:  The patient/family report no signs or symptoms suggestive of developmental learning disorder.  HS.  Associated degree in business - patient is unable to describe in meaningful detail due to aphasia  Estimated Educational Experience: 14 years of formal education.  Social History:  Patient lives with her  who is the primary caregiver. She is limited additional social support. She has no living relatives is  "the "last one alive". And has no children. since senior care for the age 65 patient has not been engaged any significant meaningful social or community activities. Family reports that following senior care she was actively traveling with her  for. However this stopped early. She was diagnosed with fibromyalgia sometime within 5 years senior care and has reported minimal social or cognitive stimulation in the form of social with her cognitive exercises subsequently thereafter.  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 She reports being the  of [employer] for extended period of time and frequently references politicians and advocacy groups that she has been involved with. Patient retired at the age [age] for unclear reasons.  Retired/Quit: 0  Relevant Medical History:  HTN  Fibromyalgia     Neurocognitive Disorder Features  Onset/Duration:  Jul 2020 (~3-year)  First Symptom:  Memory impairment  Progression:  Gradually Progressive  Clinical Course:  Hospitalization (05/04/2022)  Type: Chart Review. The patient is a 77-year-old female past medical history fibromyalgia, hypertension, hysterectomy who presents with her  for concern for altered mental status including slurred speech, pain to lower extremities, seeing lights, and not making sense. Patient's her  reports that he believes that she has had some early signs of dementia at getting worse over the past few months however the patient does not agree to seek any medical care until today. He says it has been hard to convince her to seek any treatment. He states that sometimes she is hallucinating and sometimes she has garbled speech. He states today he left the house this morning at 9:00 a. M. To run errands. At that time she was not "normal" but that she was at her baseline. When he returned home about 2-1/2 hours ago he noted that she seemed to have slurred speech, reporting seeing lights, " complaining severe pain to her legs.  Electronic Medical Record (05/06/2022)  Type: Chart Review. Pt recently D/C'd from hospital for HTN encephalopathy. Concern from pt's her  for ongoing cognitive decline past couple of years but did not want to see a neurologist. Started on seroquel 12. 5mg QHS at D/C. CTH on file with mild volume loss per report.  Primary Care Provider (05/11/2022)  Type: Chart Review. Ms. the patient is a 77 year-old female with fibromyalgia and HTN who is presenting to clinic as a hospital follow-up appointment. She was recently hospitalized with encephalopathy secondary to a hypertensive urgency that resolved with re-initiation of her anti-hypertensives. She was discharged home on nifedipine and seroquel was started due to concerns of chronic changes in mentation. Since discharged home she has been doing well. Her  has been monitoring her blood pressure and has had to hold the nifedipine several times due to systolic blood pressure readings of 100. Per her , she has been having intermittent changes in mental status for the past months- he relayed this concern to the primary team while she was hospitalized and an appointment has been scheduled with neurology for further workup.  [facility] - Jaison Javier MD. Neurologist (07/20/2022)  Type: Chart Review. The patient presents with her , who is the primary historian. Additional history is gathered from a review of previous medical records. 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 The circumstances surrounding her early snf are unclear. The patient professes significant pride in her work and has not been very active since her snf. The family reported within a few years of her snf, the patient was diagnosed with fibromyalgia in the setting of chronic pain and cramps. The earliest reference to this diagnosis in the medical  chart was in 2015. It is unclear how this diagnosis was made, her specific symptoms, and whether there was an actual association in time with the patient's cognitive impairment. The patient denies any personal or her family history of autoimmune disorders. The onset of the patient's symptoms is unclear; however, her family thinks it happened during early COVID. The patient denies yandy COVID before or during the beginning of her symptoms. Her family denies any other overt changes in their daily behavior or medications. Over the last two years, her family reported in no specific order progressive incomprehensive speech, garbled talk with agitation, irritability, and apathy. The patient has always been active, social, and engaged; however, she has become more withdrawn and apathetic watching TV. She has become mildly paranoid and disinterested. The family reported an overall change in her personality; however, more so a magnification of baseline tendencies rather than a fundamental shift in temperament. Her family says she has been having increasing episodes of confusion and miscommunication between the patient and her family resulting in growing irritability and frustration, which compounds deficits and has resulted in agitation. During periods of frustration with agitation patient's cognitive deficits, including memory deficits, appear to worsen the patient's behavior programs more difficult to manage. Between 2020 and 2021, deficits gradually increased, becoming more possessive without over compulsions worsening memory and irritability. During this time frame, her family denies any overt signs of disinhibition, lack of sympathy or empathy, change in eating behavior, restlessness, movement disorders, or motor changes. From [id number] her family has requested a medical professional see her; however, she has refused this at every opportunity. The patient does have significant semantic aphasia deficits. She can  describe in unclear words that she does not feel intervention would change anything and that her deficits are due to age. In 2022, the patient's cognitive deficits cristina to medication noncompliance and interference with instrumental activities of daily living. The patient presented to the hospital in early 2022 with hypertensive encephalopathy, likely due to medication noncompliance. Following this hospitalization, staff recognized the patient's neurocognitive deficits, and she was referred to our neurocognitive Clinic for further evaluation. On presentation, the patient has diminished interpersonal responsiveness to social cues and is loud, and fast with somewhat incomprehensible speech, semantic aphasia, limited insight, and mild disinhibition. The observations made above, were discussed with the patient and her family. The family reports that minimum a 2 year history progressive multi domain cognitive impairment sufficient interfere with instrumental activities of daily living. Neurocognitive assessment shows language predominant multi major cognitive impairment. Neurological imaging is consistent with frontal temporal lobar degeneration. 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 Is unclear whether the patient is actively having hallucinations or whether not this is due to semantic aphasia. Recommend monitoring. Given patient's age likely mixed pathology. Recommend a trial of donepezil with follow-up in 1 month ongoing screening labs for reversible cause of cognitive impairment. Following this may consider increasing Seroquel for behavioral disinhibition and referral to speech therapy for additional care support.  [facility] - Jaison Javier MD. Neurologist (06/01/2023)  Type: Chart Review. 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 The patient has significant oppositional care. The patient was subsequently lost to follow-up did not complete her workup. Serum laboratories were within normal limits. No evidence of elevated neurofilament light chain. No evidence of an autoimmune condition. Since last time seen patient's semantic dementia has gradually worsened. On presentation the patient is pleasant appropriate with fluent around chaotic speech with unclear meaning phrases. She is frankly perseverative mildly obsessive however pleasant. her family expresses concerns regarding progressive decline in asking if there any new therapies. We expressed that there is no disease modifying therapy for semantic dementia at this time. her family versus understanding. Recommend focusing on modifiable risk factors. The patient has a notable history of B12 deficiency recommend restarting B12. Regarding patient's bothersome fibromyalgia like symptoms with increased bilateral total lower extremities. We expressed concerns regarding possible opportunity related to cognitive dysfunction. The patient is currently having severe cramping once every 1-2 weeks. We discussed medication options prior trials. Recommend starting gabapentin vitamin B6 for lower extremity hyper excitability and p. R. N. Clonazepam for severe cramping that occurs every 1-2 weeks. We will refill meloxicam will recommend for the patient following up with primary rheumatologist. Recommend repeating B12 panel given history of deficiency without replenishment. The patient expresses understanding like to wait. May also start Belsomra 10 mg q. H. S. For insomnia. We answered all patient's and her family's questions. The observations made above, were discussed with the patient and her family.  [facility] - Jaison Javier MD. Neurologist (07/11/2023)  Type: Chart Review. Last time seen the patient was diagnosed  with frontal temporal dementia with mixed semantic aphasia. Recommend starting Belsomra for insomnia. Recommend repeating blood work for additional biomarker testing. Blood work was not completed. On presentation today her family reports the patient declines blood work or additional investigation. We discussed disease modifying therapy for Alzheimer's disease we discussed that the patient would likely not be a candidate nor would likely respond and minimal we would need to do additional diagnostic testing before confirming the patient was a candidate. The patient refused blood work the patient will likely refuse lumbar puncture. No additional diagnostic management. The patient and her family were not able to start vitamin B12 B6 clonazepam as initially prescribed. Unclear exactly what the issue was. We have called pharmacy pharmacy says they received medication and it has been available for the last month. Recommend following up with social work services as the patient is oppositional to additional diagnostics. All as needed. The observations made above, were discussed with the patient and her family.  [facility] - Jaison Javier MD. Neurologist (12/15/2023)  Type: Chart Review. Since the patient's last visit, there has been an increase in hospitalizations due to chronic pain. To recap, the patient was previously diagnosed with semantic/behavioral variant frontotemporal dementia, exhibiting oppositional behavior and limited insight. We discussed options for symptom management, but the patient was resistant, leading us to explore other tests. Medications for movement disorders and akathisia were suggested, yet not initiated. The patient reports escalating problematic behavior, marked by increased agitation, confusion, and disorientation. The patient frequently forces her  to leave the house, driven by anxiety. It remains uncertain whether this behavior stems from a failure to recognize her  , irritability, or worsening semantic aphasia. Notably, the patient's compliance with medication is declining; she often conceals or outright refuses certain medications. When she rejects her sleep aids, her insomnia worsens, leading to increased irritability the following day. The patient's her  reports being regularly expelled from their home. During these episodes, he finds temporary refuge nearby, often having to wait hours before returning when the patient becomes calm. Occasionally, the patient mistakenly expects a visit from her  mother, prompting her  to discreetly remove himself from her presence. The house is equipped with cameras around the entrances, alerting the  if the patient attempts to leave. This constant vigilance affects his sleep, as he fears she might wander off. He estimates being forced to leave the house approximately once a week. During these episodes, rational communication with the patient is futile; he simply waits for them to subside. The patient lacks insight into her dementia condition. The  acknowledges the need for additional help but faces challenges due to the patient's paranoia and distrust of outsiders, whom she suspects of theft. He expresses uncertainty about his capacity to continue managing her care and is unsure of what his breaking point might be. We discussed adjusting medications for cognitive impairment. It is recommended to increase Seroquel dosage to 50 mg by crushing the tablet for evening administration. The patient's inconsistent use of zolpidem may necessitate a switch to a spray form, which can be administered orally or potentially added to liquids. A follow-up telephone appointment in two weeks is recommended to monitor medication adherence and consider initiating treatments for agitation and disruptive behavior. The observations made above, were discussed with the patient and her family.     Current  Presentation  Recent/Interim History:  Last time seen the patient was reporting increasing medication noncompliance and hostility with her family. The patient was refusing medications p. R. N. We tend to get zolpidem spray however unable to find a pharmacy that providing this. As the patient was refusing medication recommended crushing immediate release Seroquel 50 mg at night and titrating upwards. Since crushing Seroquel on a regular basis patient's agitation has moderately improved. The patient still continues to have 1 2 days per week where she will be combative and agitated. Recommend increasing his 75 mg at night for 1 week. If agitation continues will increase to 100 mg p. O. Do not recommend increasing be on 100 mg without further evaluation. The patient is otherwise stable time. She is eating well. Sleeping better while taking the Seroquel. No significant other problems focalized by the patient or her family. New all medications as such with gradual plan to titrate up Seroquel. her family we answered all patient's questions or concerns. Follow-up as needed. Patient's primary her caregiver will reach out to us the patient portal in 2 weeks to determine whether not further increase the Seroquel required.  Unresolved Concern(s) reported by patient/family:  Semantic aphasia  Opposition to care     Review of cognitive, visuospatial, motor, sensory, and behavioral systems:     Memory:   The patient's memory has worsened in the past few years.  She does repeat statements or asks the same question repeatedly.  She does have difficulty remembering recent important conversations.  She does not have difficulty remembering recent events.  She does forget information within minutes.  Her recent retrograde memory is impaired.  Her remote memory is intact.  Attention:   The patient's attention and concentration are impaired.  She does not have attentional fluctuations.  She does have difficulty with selective  attention.  She does become easily distracted.  She does have difficulty with divided attention.  Executive:   The patient's cognitive processing speed is slower.  She does have difficulty with working memory.  She does misplace personal items (e.g., keys, cell phone, wallet) more frequently.  She does have difficulty keeping track of her medications.  She does have difficulty with planning/organizing/completing multistep tasks.  She does have difficulty with executive attention.  She does not have difficulty with flexible thinking.  She does not difficulty with self control.  She is exhibiting new symptoms that suggest they have become more impulsive, rash and/or careless.  Her judgment is impaired.  Language:   The patient's speech is affected.  She does forget people's names more frequently.  She does have word-finding difficulties.  Her speech is fluent and non-effortful.  Her speech is not grammatically intact.  She does not make word substitutions.  She does not have difficulty reading.  She appears to have impaired comprehension.  Visuospatial:   The patient has new visuospatial problems.  She has become confused or disoriented in *new*, unfamiliar places.  She does not have trouble with navigation.  She does not get lost in familiar places.  She does not have visuospatial disorientation.  She does not have difficulty recognizing objects or faces.  Motor/Coordination:   The patient does have difficulty with walking.  She does feel imbalanced.  She denies having fallen.  She does not appear to have new muscle weakness.  She does have difficulty buttoning shirts, operating zippers, or manipulating tools/utensils.  Her handwriting has not become micrographic.  She does have a resting tremor.  She denies having any new involuntary movements and/or muscle jerking.  She does not have swallowing difficulty.  She denies new muscle cramps and twitching.  Sensory:   The patient denies new numbness, tingling,  paresthesias, or pain.  The patient denies a loss of vision, blurry vision, or double vision.  The patient denies new loss of hearing or worsening tinnitus.  The patient denies anosmia.  Sleep:   The patient reports difficulty sleeping.  The patient does not snore or have witnessed apneas while sleeping.  When she wakes up in the morning, she does feel well-rested.  She denies dream-enactment behavior.  She denies symptoms suggestive of restless leg syndrome.  Behavior:   The patient's personality has changed. Comment: exaggeration of baseline  She does have symptoms of disinhibition and social inappropriateness. Comment: exaggeration of baseline  She is exhibiting symptoms to suggest a loss of manners and/or decorum. Comment: exaggeration of baseline  She does not appear apathetic or has decreased motivation.  She does not appear to have a change in inertia.  The patient's emotional expression has changed. Comment: exaggeration of baseline  She does have emotional blunting or lability. Comment: exaggeration of baseline  She does have symptoms of irritability and mood lability.  She does not have symptoms of agitation, aggression, or violent outbursts.  Her insight into his disease and situation is impaired.  Her personal hygiene is intact.  She is not exhibiting a diminished response to other people's needs and feelings.  She is exhibiting diminished social interest, interrelatedness, and/or personal warmth.  She does appear restless.  She denies new and/or worsening simple repetitive behaviors.  Her speech has become simplified and/or repetitive/stereotyped.  She reports symptoms that are suggestive of new/worsening complex repetitive/ritualistic compulsions and behaviors.  She does not have symptoms of hyper-religiosity or dogmatism.  Her interests/pleasures have not become restrictive, simplified, interrupting, or repetitive.  She denies a change of self-stimulating behavior.  She denies any changes in eating  behavior.  She denies increased consumption of food or substances.  She denies oral exploration or consumption of inedible objects.  Psychiatric:   She does not feel depressed.  She is exhibiting symptoms of social withdrawal/indifference.  She denies anxiety.  She does not exhibit cycling behavior.  She does exhibit hyperactive behavior.  She is exhibited symptoms of paranoia.  She does not have delusions.  She does not have hallucinations. Comment: possible? Unclear if language impairment and not able to vocalize her thoughts accurately or actual hallucination  She does not have a history of sensitivity to neuroleptic/psychotropic medications.  Medical Review of Systems:   The patient does not have constipation.  The patient does not have urinary incontinence.  The patient denies orthostatic lightheadedness.  The patient's weight is stable.  Functional status:  Difficulty performing the following Instrumental ADLs:  Household chores: Yes  Food Preparation: No  Shopping: Yes  Ability to Handle Finances: Yes  Transportation/Driving: Yes  Household Appliances/Stove: Yes  Laundry: Yes  Difficulty performing the following Basic ADLs:  Dressing: No  Bathing: No  Toileting: No  Personal hygiene and grooming: No  Feeding: No  Care Management:  Patient/Family Safety Concerns:  Medication Adherence: No  Home Safety: No  Wandered: No  Firearms: No  Fall Risk: No  Home Alone: No  Neuropsychological Evaluation Summary:  Prior Neurocognitive/Neurobehavioral Evaluation(s)  No Prior Testing Available  2022-07-20:  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  Severe Language Impairment: She scored >3 standard deviations below the norm on at least one measure. She had difficulty with naming, abstract, semantic association, Semantics.  Moderate Visuospatial Impairment: visuospatial construction, simultanagnosia.  Moderate Executive Impairment: She scored >1.5 standard  deviations below the norm on at least one measure. She had difficulty with working memory, lexical fluency.  Moderate Attention Impairment: orientation.  MMSE 17/30: MMSE Score suggestive of moderate cognitive impairment.  MOCA 10/30: MOCA Score suggestive of moderate cognitive impairment.  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     Neurocognitive/Neurobehavioral Evaluation completed on 2024-01-02    Neuropsychiatric/Behavioral Focused Evaluation Assessment    BEHAV5+ 4/6 See ROS section for a full description   Laboratories:     Lab Date Value [Reference]   Autoimmune/Paraneoplastic Screening           NAILA 2022, Jul-20    Negative       CRP 2022, Jul-20    2.5 [0.0 - 8.2 mg/L]      Haptoglobin 2022, Jul-20    159 [30 - 250 mg/dL]      Sed Rate 2022, Jul-20    11 [0 - 36 mm/Hr]      Metabolic Screening   Ferritin 2022, Jul-20    46 [20.0 - 300.0 ng/mL]      Hemoglobin A1C External 07/20/2022  4.9 [4.0 - 5.6 %]      Methlymalonic Acid 07/20/2022  0.21      T4 Total 07/20/2022  7.6 [4.5 - 11.5 ug/dL]      TSH 05/04/2022  1.946 [0.400 - 4.000 uIU/mL]      Glucose 2023, Mar-22    76 [70 - 110 mg/dL]      Iron 2022, Jul-20    55 [30 - 160 ug/dL]      Saturated Iron 2022, Jul-20    14 (L) [20 - 50 %]      TIBC 2022, Jul-20    404 [250 - 450 ug/dL]      Transferrin 2022, Jul-20    273 [200 - 375 mg/dL]      Albumin 2023, Mar-22    4.2 [3.5 - 5.2 g/dL]      Alkaline Phosphatase 2023, Mar-22    97 [55 - 135 U/L]      ALT 2023, Mar-22    15 [10 - 44 U/L]      AST 2023, Mar-22    26 [10 - 40 U/L]      BILIRUBIN TOTAL 2023, Mar-22    0.6 [0.1 - 1.0 mg/dL]      PROTEIN TOTAL 2023, Mar-22    7.9 [6.0 - 8.4 g/dL]      Cholesterol 2022, May-05    189 [120 - 199 mg/dL]      HDL 2022, May-05    52 [40 - 75 mg/dL]      Non-HDL Cholesterol 2022, May-05    137 [mg/dL]      Triglycerides 07/20/2022  75 [30 - 150 mg/dL]      B12 Def.  Methylmalonic Acid 07/20/2022  0.20      Folate 05/11/2022  4.5 [4.0 - 24.0 ng/mL]      Thiamine 07/20/2022  44 [38 - 122 ug/L]      Vitamin B-12 05/11/2022  307 [180 - 914 ng/L]      Vitamin E 2022, Jul-20    1610 [500 - 1800 ug/dL]      Cerebrospinal Fluid Assessment   IgG 07/20/2022  1189 [650 - 1600 mg/dL]      Neuroendocrine/Electrolyte Screening   Magnesium 05/04/2022  2.1 [1.6 - 2.6 mg/dL]      BUN 2023, Mar-22    15 [8 - 23 mg/dL]      Chloride 2023, Mar-22    110 [95 - 110 mmol/L]      Creatinine 2023, Mar-22    0.9 [0.5 - 1.4 mg/dL]      Potassium 2023, Mar-22    3.5 [3.5 - 5.1 mmol/L]      Sodium 2023, Mar-22    145 [136 - 145 mmol/L]      Infectious Disease/Immunocompromised Screening   SARS-CoV-2 RNA, Amplification, Qual 2022, May-04    Negative      Hepatitis C Ab 07/20/2022  Negative      HIV 1/2 Ag/Ab 07/20/2022  Negative      Syphilis Treponemal Ab 2022, Jul-20    Nonreactive [Nonreactive]      Standard Hematology Screen   Hematocrit 2022, Jul-20    38.3 [37.0 - 48.5 %]      Hemoglobin 2022, Jul-20    11.9 (L) [12.0 - 16.0 g/dL]      MCV 2022, Jul-20    92 [82 - 98 fL]      Platelets 2022, Jul-20    285 [150 - 450 K/uL]      Toxin/Heavy Metal Screening   Amphetamine Screen, Ur 2022, May-04    Negative      Barbiturate Screen, Ur 2022, May-04    Negative      Benzodiazepines 2022, May-04    Negative      Cocaine (Metab.) 2022, May-04    Negative      Marijuana (THC) Metabolite 2022, May-04    Negative      Methadone metabolites 2022, May-04    Negative      Opiate Scrn, Ur 2022, May-04    Negative      Phencyclidine 2022, May-04    Negative      Delirium Screening   Glucose, UA 2022, May-04    Negative      Ketones, UA 2022, May-04    Negative      Leukocytes, UA 2022, May-04    1+ (A)      NITRITE UA 2022, May-04    Negative      Protein, UA 2022, May-04    Negative      RBC, UA 2022, May-04    2 [0 - 4 /hpf]      WBC, UA 2022, May-04    1 [0 - 5 /hpf]           Neuroimaging:    MRI brain/head  without contrast on 5/4/2022  Formal interpretation by Radiology:  Please note image quality is mildly degraded by patient motion artifact, most notably postcontrast images. There is no abnormal restricted diffusion to suggest acute infarction. There is generalized cerebral volume loss with compensatory prominence of the extra-axial spaces, cerebral sulci and ventricular system. The ventricles demonstrate no evidence of hydrocephalus or midline shift. Minimal periventricular T2/FLAIR hyperintensity which is nonspecific but likely reflect sequela of chronic microvascular ischemic change. There are no abnormal areas of gradient susceptibility to suggest parenchymal hemorrhage. No extra-axial hemorrhage or fluid collections. Following the administration of IV contrast, allowing for motion artifact, there are no definite pathologic foci of enhancement. The craniocervical junction and sellar regions are within normal limits.  Independently reviewed radiological imaging by Jaison Jackson MD. MPH. Behavioral Neurologist  T1: Mild-to-moderate frontal temporal lobar degeneration with regional atrophy most well appreciated in the anterior cortices dorsal greater than ventral with thinning of the anterior cingulate sulci, atrophy of the medial prefrontal dorsal lateral prefrontal relatively mild atrophy of the orbital frontal cortex. Most atrophy appears to be the dorsal midline greater than lateral prefrontal cortex. In addition there is right greater than left anterior temporal atrophy with relative sparing of hippocampi however still mild bilateral hippocampal it atrophy. No significant posterior cortical atrophy. Generalized thinning of the anterior cingulate with sparing of the mid brain and pontine region.  T2/FLAIR: No Significant hyperintensities appreciated on MRI T2/FLAIR  DWI/ADC: No Significant DWI hyperintensities/hypointensities. No ADC correlation.  SWI/GRE: No Significant hypointensities to suggest  cortical/subcortical hemosiderin deposition.  Impression: : Bilateral prefrontal cortical atrophy and right greater than left anterior temporal atrophy consistent with frontal temporal lobe degeneration. Sparing of the brainstem. No significant subcortical white matter disease. No significant posterior cortical atrophy.     Procedures:    Electrocardiogram on 5/4/2022  Formal interpretation:  Vent. Rate : 071 BPM     Atrial Rate : 071 BPM    P-R Int : 166 ms          QRS Dur : 068 ms     QT Int : 384 ms       P-R-T Axes : 068 011 045 degrees    QTc Int : 417 ms Normal sinus rhythm Low voltage QRS Abnormal ECG  Independently reviewed Electrocardiogram by Jaison Jackson MD. MPH. Behavioral Neurologist  Impression: : Received ECG has no evidence of sinus node disease. HR (>=50-60). Prolonged VT interval (>0.22 s). Broad QRS complex (> 0.12 s).     Clinical Summary:     The patient is a 78-year-old right-handed female with a relevant past medical history of HTN with encephalopathy, fibromyalgia, who presents reporting a 3-year history of gradually progressive neurocognitive impairment.       The clinical history is suggestive of:  Memory Impairment: STM encoding impairment, LTM encoding-retrieval impairment, Amnesia of fixation  Attention Impairment: Attention, Selective attention, Sustained attention, Shifting attention  Executive Impairment: Energization, Working Memory, Set-Shifting, Response Inhibition  Language Impairment: Language Dysfunction, Grammar Dysfunction, Receptive Dysfunction  Visuospatial Impairment: Allocentric Spatial Processing  Motor/Coordination Impairment: Sensory motor integration, Central pattern generators dysfunction  Behavior Impairment: Emotional Regulation, Self-Preservation Dysregulation, Social Coherence, Sensorimotor Dysregulation  Psychiatric Impairment: Stimulation Dysregulation, Paranoia  iADL Impairment: Scott Instrumental Activities of Daily Living Scale  The neurological  examination is significant for:  Cortical Frontal Dysfunction: non-fluent aphasia (fluency), agrammatic aphasia (syntax comprehension, substitutions)  Cortical Frontal-Parietal Disconnection: apraxia (limb-motor)  Cortical Temporal Dysfunction: anomic aphasia (spontaneous, confrontational), semantic aphasia (superordinate errors)  Cortical Temporal-Parietal Dysfunction: dysgraphia  Cortical Transcallosal Disconnection: interhemispheric motor control (interhemispheric motor control ), motor efference (motor overflow)  Executive Impairment: thought disorder, judgment, dysexecutive behavior (perseverative/stereotyped, stimulus-bound)  Movement Disorder (Hyperkinetic): tremor (postural)  Movement Disorder (Hypokinetic): paratonia (tone), parkinsonism (bradykinesia)  Movement Disorder (Speech): abnormal vocal features (volume, rate)  The neurocognitive battery is significant (based on age and education) for:  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 Sparing of the brainstem. No significant subcortical white matter disease. No significant posterior cortical atrophy.        Assessment:        The patient's clinical presentation is behavior/psychiatric predominant major cognitive impairment (moderate dementia) sufficient to impair activities of daily living (CDR-SOB: 8 , [place] iADL: 7/8 - Mild Dementia).     The patient's clinical presentation meets the criteria for Dementia (McKhann, et al. 2011 Alzheimer's & Dementia).     Concern regarding an intraindividual change in cognition diagnosed through a combination of history and objective cognitive assessment  Impairment in two or more cognitive  domains  Interference with independence in functional abilities.  Represents a decline from previous levels of functioning.  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  Early perseverative, stereotyped, or compulsive/ritualistic behavior: simple repetitive movements, complex, compulsive or ritualistic behaviors, stereotypy of speech.  Frontal and/or anterior temporal atrophy on MRI or CT  Impaired confrontation naming.  Impaired single-word comprehension.  Impaired object knowledge, particularly for low-frequency or low-familiarity items.  Surface dyslexia or dysgraphia.  Spared repetition.  Spared speech production (grammar and motor speech).  Predominant anterior temporal lobe atrophy.     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 There is however reported symptoms of possible hallucinations. Patients  has never seen her respond to sounds/sights that were no there as such it is unclear whether 'hallucinations' are actually an inability to articulate herself or actual sensory hallucinations.   At present, all neurodegenerative diseases can only be diagnosed with 100% certainty through a brain autopsy. The suspected neuropathology underlying the patient's neurocognitive impairment is likely a mixture of pathologies (Alzheimer's Disease Related Pathology, TAR DNA-binding protein 43).  There are no plasma protein biomarkers available on record.  There are no CSF protein biomarkers available on record.  There are no dermatological protein biomarkers available on record.  There is no known relevant genetic testing  available.        The observations made above, were discussed with the patient and their supporting historian(s) (). We have discussed the additional diagnostic(s) and/or managenent below.     Care Management Plan:    #Optimize Neurocognitive Impairment and Quality  We have discussed the MIND Diet and other lifestyle behavior that may help maintain brain health.  We have provided written/digital reading material  Continue b12 5000 mcg weekly  We discussed diagnosed with bvFTD - We discussed the patient would likely not be were candidate for disease modifying therapy for Alzheimers. We discussed additional diagnostic testing that would confirm Alzheimers. Patient is not interested in a lumbar puncture. Patient is not interested in blood work. Follow up as needed  #Optimize Behavioral Management and Quality.  No indication for memantine at this time  Continue gabapentin 300 mg TID  #Optimize Cerebrovascular Health.  The patient has a documented history of hyperlipidemia and/or hypercholesteremia with long-term complications such as cerebrovascular disease, peripheral vascular disease, and/or aortic atherosclerosis. Collectively these risk factors may contribute to cerebral atherosclerosis, and cerebral hypoperfusion compounded neurocognitive disorder. We discussed maximizing cerebrovascular-related medical therapy, including but not limited to cholesterol medications and antiplatelet agents. We have discussed the value of aggressively controlling vascular risk factors like hypertension, hyperlipidemia, and Diabetes SBP<130, LDL<100, and A1C<7.0. We discussed the need to optimize lifestyle choices, including a heart-healthy diet (e.g., Mediterranean or DASH), increased cardiovascular exercise (goal 150 minutes of moderate-intensity per week), and staying cognitively and socially active.  #Optimize Fibromyalgia Disorder and Quality.  Continue gabapentin 300 mg TID  Continue meloxicam  f/u rheumatology  Continue  clonazepam 0.5 mg p.r.n. for leg cramps  Continue vitamin B6 100 mg TID  #Optimize Sleep Hygiene and Quality  We discussed and recommended additional diagnostic/management of sleep disorder to optimize brain health and longevity.  Increase Seroquel from 50 mg to 75 mg q.h.s.. Okay to crush. If after 1 week agitation is still an issue increased to 100 mg  #Behavioral/Environmental Strategies  We recommend engaging in activities that stimulate cognitively and socially while avoiding excessive stimulation and fatigue in overwhelmingly complex situations.  We recommend integrating routine and schedule into your daily life. https://www.alzheimersproject.org/news/the-importance-of-routine-and-familiarity-to-persons-with-dementia/  #Health Maintenance/Lifestyle Advice  We have discussed the value in aggressively controlling vascular risk factors like hypertension, hyperlipidemia, and Diabetes SBP<130, LDL<100, A1C<7.0.  We discussed the need to optimize lifestyle choices including a heart-healthy diet (e.g., Mediterranean or DASH), increased cardiovascular exercise (goal 150 minutes of moderate-intensity per week), and stay cognitively and socially active.  #Support  We all need support sometimes. Get easy access to local resources, community programs, and services. https://www.communityresourcefinder.org/  Learn more about Cognitive Impairment in [place]: https://www.alz.org/professionals/public-health/state-overview/[place]  #Safety  [place] has no laws against driving with dementia specifically but obviously has laws about medical conditions which impact a person's ability to drive safely. If you believe your loved one's driving capacity has diminished, please reach out to either your primary care physician or our office to discuss driving restrictions or revocation of their license. To learn more: https://www.dementiacarecentral.com/caregiverinfo/driving-problems/  The Alzheimer's Association administers the  "nationwide "Safe Return" program with identification bracelets, necklaces, or clothing tags and 24-hour assistance. More information is available online at https://www.alz.org/help-support/caregiving/safety/medicalert-with-24-7-wandering-support  #Follow up:  Follow-up as needed.    Thank you for allowing us to participate in the care of your patient. Please do not hesitate to contact us with any questions or concerns.     It was a pleasure seeing The patient and we look forward to seeing them at their follow-up visit.     This note is dictated on M*Modal Fluency Direct word recognition program. There are word recognition mistakes that are occasionally missed on review. This service was not originating from a related E/M service provided within the previous 7 days nor will  to an E/M service or procedure within the next 24 hours or my soonest available appointment. Prevailing standard of care was able to be met in this audio-only visit.     "

## 2024-01-03 NOTE — TELEPHONE ENCOUNTER
Pt was seen yesterday and DR Javier reviewed info below w/ CG  Routing to Dr Javier to advise if pt needs another med titration f/u scheduled

## 2024-01-03 NOTE — TELEPHONE ENCOUNTER
Jaison Javier MD Tortorich, Ashley M, MA  Caller: Unspecified (6 days ago,  3:27 PM)  Ok, no liquid alternatives - advise cg of situation and advise to crush seroquel as its are only option - schedule telephone f/u for med titration - next possible

## 2024-01-11 ENCOUNTER — NURSE TRIAGE (OUTPATIENT)
Dept: ADMINISTRATIVE | Facility: CLINIC | Age: 79
End: 2024-01-11
Payer: MEDICARE

## 2024-01-11 NOTE — TELEPHONE ENCOUNTER
"Pt's  calls on behalf of pt who has a hx of dementia. She is complaining of her "butt burning so bad." Pt's  is unsure if pt is constipated or what exactly is happening d/t her dementia. He states that she has gone into the bathroom "about 20 times now" and has taken 4 sitz baths today.     Care Advice recommends that pt be evaluated now. [facility] and ED discussed as best options at this time. Pt's  verbalizes understanding and states that if pt does not get any better, he will take her to [facility] tomorrow. NT reiterates care advice and instructs  to call back if pt develops any new/worsening sxs or if he has any additional questions or concerns.   Reason for Disposition   Severe rectal pain    Additional Information   Negative: Sounds like a life-threatening emergency to the triager   Negative: Diarrhea is main symptom   Negative: Blood in or on bowel movement is main symptom   Negative: Constipation is main symptom (e.g., pain or discomfort caused by passage of hard BMs)   Negative: Injury to rectum   Negative: Patient sounds very sick or weak to the triager    Protocols used: Rectal Symptoms-A-[place]    "

## 2024-01-22 RX ORDER — QUETIAPINE FUMARATE 50 MG/1
50 TABLET, FILM COATED ORAL NIGHTLY
Qty: 90 TABLET | Refills: 3 | Status: SHIPPED | OUTPATIENT
Start: 2024-01-22 | End: 2025-01-21

## 2024-01-22 NOTE — TELEPHONE ENCOUNTER
Refill Routing Note   Medication(s) are not appropriate for processing by [facility] for the following reason(s):        Outside of protocol  Non-participating provider    [facility] action(s):  Route               Appointments  past 12m or future 3m with PCP    Date Provider   Last Visit   1/2/2024 Jaison Javier MD   Next Visit   Visit date not found Jaison Javier MD   ED visits in past 90 days: 0        Note composed:9:09 AM 01/22/2024

## 2024-02-04 ENCOUNTER — PATIENT MESSAGE (OUTPATIENT)
Dept: ADMINISTRATIVE | Facility: OTHER | Age: 79
End: 2024-02-04
Payer: MEDICARE

## 2024-03-06 RX ORDER — NIFEDIPINE 60 MG/1
60 TABLET, EXTENDED RELEASE ORAL DAILY
Qty: 90 TABLET | Refills: 0 | Status: SHIPPED | OUTPATIENT
Start: 2024-03-06 | End: 2024-04-03 | Stop reason: SDUPTHER

## 2024-03-06 NOTE — TELEPHONE ENCOUNTER
No care due was identified.  [facility] Embedded Care Due Messages. Reference number: [id number].   3/06/2024 11:48:42 AM CST

## 2024-03-07 RX ORDER — GABAPENTIN 300 MG/1
300 CAPSULE ORAL 3 TIMES DAILY
Qty: 90 CAPSULE | Refills: 2 | Status: SHIPPED | OUTPATIENT
Start: 2024-03-07

## 2024-03-14 ENCOUNTER — OFFICE VISIT (OUTPATIENT)
Dept: PAIN MEDICINE | Facility: CLINIC | Age: 79
End: 2024-03-14
Payer: MEDICARE

## 2024-03-14 VITALS
BODY MASS INDEX: 24.12 KG/M2 | HEART RATE: 75 BPM | SYSTOLIC BLOOD PRESSURE: 131 MMHG | WEIGHT: 140.56 LBS | DIASTOLIC BLOOD PRESSURE: 81 MMHG

## 2024-03-14 DIAGNOSIS — M47.816 LUMBAR SPONDYLOSIS: ICD-10-CM

## 2024-03-14 DIAGNOSIS — M48.062 SPINAL STENOSIS OF LUMBAR REGION WITH NEUROGENIC CLAUDICATION: ICD-10-CM

## 2024-03-14 DIAGNOSIS — M51.36 DDD (DEGENERATIVE DISC DISEASE), LUMBAR: Primary | ICD-10-CM

## 2024-03-14 PROCEDURE — 99214 OFFICE O/P EST MOD 30 MIN: CPT | Mod: S$PBB,,, | Performed by: PAIN MEDICINE

## 2024-03-14 PROCEDURE — 99213 OFFICE O/P EST LOW 20 MIN: CPT | Mod: PBBFAC,PN | Performed by: PAIN MEDICINE

## 2024-03-14 PROCEDURE — 99999 PR PBB SHADOW E&M-EST. PATIENT-LVL III: CPT | Mod: PBBFAC,,, | Performed by: PAIN MEDICINE

## 2024-03-14 NOTE — PROGRESS NOTES
Subjective:     Patient ID: Mamta Bates is a 79 y.o. female    Chief Complaint: Follow-up        Referred by: No ref. provider found      HPI:    Interval History (3/14/24):  She returns today for follow up.  Due to dementia, all information is provided by her .  He reports that bilateral lumbar radiofrequency ablations seem to have provided an improvement in her pain.  He states she complains less of low back pain since these procedures.  However, she still does have significant low back pain.    Interval History PA (11/14/2023):  Patient returns to clinic for follow up.  Patient is s/p bilateral L2, L3, L4 medial branch block #1.  Patient is a poor historian due to history of dementia, most of today's information has been provided by patient's .  Following the 1st set of medial branch blocks the patient does report having benefit initially.  Notes immediately following the procedure her back pain was significantly reduced and overall at a more manageable level.  Patient did continue to note significant pain in her bilateral lower extremities, mainly in the calves region.  However feels there was good benefit for her bilateral lower lumbar paraspinal pain.  Relief lasted approximately 4 hours.  Pain then started returned and was at baseline later that evening.  The patient's  specifically noting less pain complaints following the procedure and throughout the remainder of the day.  Notes that he asked the patient various times what her pain level as was and which he notes was at a 2-3/10.  Reporting approximately 80% improvement.  States he noticed a difference in her overall pain levels as well as mood and activity levels.  States he did note improvement in ADLs on the day of the procedure.  Patient and  would like to continue with the 2nd set of medial branch blocks.    Interval History PA (09/12/2023):  Patient returns to clinic for follow up.  Patient is s/p caudal epidural steroid  injection done on 08/24/2023 with some improvement of her lower back and lower extremity pain although patient having difficulty quantifying relief given history of dementia.  Patient is a poor historian, most of the information provided by patient's .  Patient is noted to have decreased cramping and pain in her bilateral calves only the injection.  Some decreased lower extremity symptoms.  However patient continues to endorse significant pain in her midline lower lumbar spine into her bilateral lumbar paraspinal region.  Also having continued pain radiating into her bilateral lower extremities however radiating pain is less severe since the injection.  Patient does have a history of fibromyalgia with diffuse pain patterns throughout upper, lower back and extremities.  Denies any numbness, tingling, weakness, bowel or bladder dysfunction.  She continues to take Tylenol, BC powder, gabapentin, and tizanidine p.r.n. with benefit, denies any adverse effects from these medications.    Interval History PA (08/15/2023):  Patient returns to clinic for follow up and MRI review.  Patient denies any changes in the quality or location of her pain since previous visit.  She continues to endorse bilateral lower back pain with occasional radiation into her bilateral lower extremities.  Patient does have a history of fibromyalgia and is reporting diffuse pain patterns throughout her neck, upper back, lower back and extremities.  Majority of pain appears to be located in her lower back as well as noting significant pain in her bilateral calves.  Notes associated muscle spasms and sharp pain that occurs intermittently in her calves.  Notes pain is constant throughout the day, worsened with certain activities.  Notes having to take frequent breaks to alleviate the pain.  Denies any weakness, numbness, tingling, bowel or bladder dysfunction.  She continues to take Tylenol, BC powder, gabapentin and tizanidine p.r.n. with  benefit, denies any adverse effects from these medications.    Interval History PA (07/28/2023):  Patient returns to clinic for follow up of chronic bilateral lower back and lower extremity pain.  Patient reporting various intermittent pains throughout her body.  She does have a history of fibromyalgia and has followed up with Rheumatology.  Rheumatology not find any inflammatory arthritis.  Reporting that the worst of her pain is located across her bilateral lower lumbar paraspinal region.  Reports pain is constant, achy.  Notes worsens with certain activities and throughout the day.  Patient also noting pain occasionally radiating from her back into her bilateral thighs and into her calves.  Noting significant calf pain, associated with occasional cramps and muscle tightness.  Denies any weakness, numbness, tingling, bowel or bladder dysfunction.  Patient has tried various medications including tramadol, Cymbalta, Lyrica, gabapentin without significant benefit.  Currently taking Tylenol p.r.n., BC powder, gabapentin and tizanidine p.r.n..  Reports some benefit with these medications, denies any adverse effects.    Initial Encounter (9/20/22):  Mamta Bates is a 79 y.o. female who presents today with chronic low back and lower extremity pain.  This pain has been present for years.  Patient states that she has been diagnosed with fibromyalgia in the past though this diagnosis has been disputed.  Pain is intermittent.  States that the pain can get very severe at times.  During these times it is difficult for her to be active at all.  Pain is located across the lower lumbar region.  The pain will radiate to lower extremities to her calves.  She reports having associated cramps of the lower extremities.  She denies any associated numbness, tingling, weakness, bowel bladder dysfunction.   This pain is described in detail below.    Physical Therapy:  Yes.     Non-pharmacologic Treatment:  Rest helps         TENS?   No    Pain Medications:         Currently taking:  Meloxicam, tizanidine, gabapentin, BC powder    Has tried in the past:  NSAIDs, Tylenol, tramadol, Cymbalta, Lyrica    Has not tried:  TCAs, SNRIs, topical creams    Blood thinners:  None    Interventional Therapies:    08/24/2023 - caudal epidural steroid injection - mild-moderate relief of lower extremity pain  12/14/23 - left L2, L3, L4 radiofrequency ablation - relief reported by   1/18/24 - right L2, L3, L4 radiofrequency ablation - relief reported by     Relevant Surgeries:  None    Affecting sleep?  Yes    Affecting daily activities? yes    Depressive symptoms? no          SI/HI? No    Work status: Retired    Pain Scores:    Best:       6/10  Worst:     6/10  Usually:   6/10  Today:    6/10    Pain Disability Index  Family/Home Responsibilities:: 5  Recreation:: 5  Social Activity:: 6  Occupation:: 0  Sexual Behavior:: 0  Self Care:: 5  Life-Support Activities:: 2  Pain Disability Index (PDI): 23    Review of Systems   Constitutional:  Negative for activity change, appetite change, chills, fatigue, fever and unexpected weight change.   HENT:  Negative for hearing loss.    Eyes:  Negative for visual disturbance.   Respiratory:  Negative for chest tightness and shortness of breath.    Cardiovascular:  Negative for chest pain.   Gastrointestinal:  Negative for abdominal pain, constipation, diarrhea, nausea and vomiting.   Genitourinary:  Negative for difficulty urinating.   Musculoskeletal:  Positive for back pain, gait problem and myalgias. Negative for arthralgias, neck pain and neck stiffness.   Skin:  Negative for rash.   Neurological:  Negative for dizziness, weakness, light-headedness, numbness and headaches.   Psychiatric/Behavioral:  Positive for sleep disturbance. Negative for hallucinations and suicidal ideas. The patient is not nervous/anxious.        Past Medical History:   Diagnosis Date    Dementia     Fibromyalgia     Hypertension         Past Surgical History:   Procedure Laterality Date    Block-nerve-medial branch-lumbar---BILATERAL L2, L3, L4 - Bilateral Bilateral 11/09/2023    Block-nerve-medial branch-lumbar---BILATERAL L2, L3, L4 - Bilateral  12/14/2023    CAUDAL EPIDURAL STEROID INJECTION  08/24/2023    CAUDAL EPIDURAL STEROID INJECTION N/A 08/24/2023    Procedure: Injection-steroid-epidural-caudal;  Surgeon: Deep Ortega Jr., MD;  Location: [facility];  Service: Pain Management;  Laterality: N/A;    HYSTERECTOMY      INJECTION OF ANESTHETIC AGENT AROUND MEDIAL BRANCH NERVES INNERVATING LUMBAR FACET JOINT Bilateral 11/09/2023    Procedure: Block-nerve-medial branch-lumbar---BILATERAL L2, L3, L4;  Surgeon: Deep Ortega Jr., MD;  Location: [facility];  Service: Pain Management;  Laterality: Bilateral;    INJECTION OF ANESTHETIC AGENT AROUND MEDIAL BRANCH NERVES INNERVATING LUMBAR FACET JOINT Bilateral 12/14/2023    Procedure: Block-nerve-medial branch-lumbar---BILATERAL L2, L3, L4;  Surgeon: Deep Ortega Jr., MD;  Location: [facility];  Service: Pain Management;  Laterality: Bilateral;    RADIOFREQUENCY ABLATION Right 1/18/2024    Procedure: Radiofrequency Ablation---RIGHT L2, L3, L4;  Surgeon: Deep Ortega Jr., MD;  Location: [facility];  Service: Pain Management;  Laterality: Right;    Radiofrequency Ablation---RIGHT L2, L3, L4 (Right)   Right 01/18/2024       Social History     Socioeconomic History    Marital status:    Tobacco Use    Smoking status: Never    Smokeless tobacco: Never   Substance and Sexual Activity    Alcohol use: No    Drug use: No    Sexual activity: Yes     Partners: Male     Social Determinants of Health     Financial Resource Strain: High Risk (12/29/2023)    Overall Financial Resource Strain (CARDIA)     Difficulty of Paying Living Expenses: Very hard   Food Insecurity: Food Insecurity Present (12/29/2023)    Hunger Vital Sign     Worried About Running Out  of Food in the Last Year: Sometimes true     Ran Out of Food in the Last Year: Never true   Transportation Needs: No Transportation Needs (12/29/2023)    PRAPARE - Transportation     Lack of Transportation (Medical): No     Lack of Transportation (Non-Medical): No   Physical Activity: Unknown (12/29/2023)    Exercise Vital Sign     Days of Exercise per Week: 0 days   Stress: Stress Concern Present (12/29/2023)    [employer] [place] [employer] - Occupational Stress Questionnaire     Feeling of Stress : To some extent   Social Connections: Unknown (12/29/2023)    Social Connection and Isolation Panel [NHANES]     Frequency of Communication with Friends and Family: Never     Frequency of Social Gatherings with Friends and Family: Never     Active Member of Clubs or Organizations: No     Attends Club or Organization Meetings: Never     Marital Status:    Housing Stability: Low Risk  (12/29/2023)    Housing Stability Vital Sign     Unable to Pay for Housing in the Last Year: No     Number of Places Lived in the Last Year: 1     Unstable Housing in the Last Year: No       Review of patient's allergies indicates:  No Known Allergies    Current Outpatient Medications on File Prior to Visit   Medication Sig Dispense Refill    cyanocobalamin, vitamin B-12, 5,000 mcg Subl Place one under tongue once a day for 1 month, then continue to take under tongue per week 30 tablet 3    gabapentin (NEURONTIN) 300 MG capsule Take 1 capsule (300 mg total) by mouth 3 (three) times daily. 90 capsule 2    magnesium oxide (MAG-OX) 400 mg (241.3 mg magnesium) tablet Take 400 mg by mouth once daily.      meloxicam (MOBIC) 7.5 MG tablet Take 1 tablet (7.5 mg total) by mouth daily as needed for Pain. 30 tablet 2    NIFEdipine (PROCARDIA-XL) 60 MG (OSM) 24 hr tablet Take 1 tablet (60 mg total) by mouth once daily. 90 tablet 0    pyridoxine, vitamin B6, (B-6) 100 MG Tab Take 3 tablets twice a day for 1 month, then 2 tablets twice a  day for 1 month, then continue 1 tablet twice a day 180 tablet 1    QUEtiapine (SEROQUEL) 50 MG tablet Take 1 tablet (50 mg total) by mouth every evening. 90 tablet 3    tiZANidine (ZANAFLEX) 2 MG tablet Take 0.5-1 tablets (1-2 mg total) by mouth every 8 (eight) hours as needed (back pain). 90 tablet 2    [DISCONTINUED] clonazePAM (KLONOPIN) 0.5 MG disintegrating tablet Take 1 tablet (0.5 mg total) by mouth daily as needed (cramping pain). 10 tablet 0    [DISCONTINUED] ezetimibe (ZETIA) 10 mg tablet Take 10 mg by mouth once daily.      [DISCONTINUED] guaiFENesin 100 mg/5 ml (ROBITUSSIN) 100 mg/5 mL syrup Take 200 mg by mouth 3 (three) times daily as needed for Cough.      [DISCONTINUED] zolpidem 5 mg/spray (0.1 mL) Spry Take 1 spray by mouth every evening. 1 each 2     No current facility-administered medications on file prior to visit.       Objective:      /81 (BP Location: Left arm, Patient Position: Sitting, BP Method: Medium (Automatic))   Pulse 75   Wt 63.7 kg (140 lb 8.7 oz)   BMI 24.12 kg/m²     Exam:  GEN:  Well developed, well nourished.  No acute distress.  Normal pain behavior.  HEENT:  No trauma.  Mucous membranes moist.  Nares patent bilaterally.  PSYCH: Normal affect.   CHEST:  Breathing symmetric.  No audible wheezing.  ABD: Soft, non-distended.  SKIN:  Warm, pink, dry.  No rash on exposed areas.    EXT:  No cyanosis, clubbing, or edema.  No color change or changes in nail or hair growth.  NEURO/MUSCULOSKELETAL:  Fully alert, oriented, and appropriate. Speech normal zuri. No cranial nerve deficits.   Gait:  Normal.  No trendelenburg sign bilaterally.       Imaging:  Narrative & Impression    EXAMINATION:  MRI LUMBAR SPINE WITHOUT CONTRAST     CLINICAL HISTORY:  Low back pain, symptoms persist with > 6wks conservative treatment; Other intervertebral disc degeneration, lumbar region     TECHNIQUE:  Multiplanar, multisequence MR images were acquired from the thoracolumbar junction to the  sacrum without the administration of contrast.     COMPARISON:  Radiograph 08/22/2022     FINDINGS:  Alignment: Mild scoliosis convex LEFT.  Grade 1 anterolisthesis L3 on L4-L4 on L5.     Vertebrae: 5 lumbar-type vertebral bodies. No aggressive marrow replacement process or fracture.Vertebral endplatebone marrow edema consistent with degenerative change L3-L4 L4-L5.     Discs: Disc space narrowing most evident L3-L4 L4-L5 level.     Cord: Normal. Conus terminates at L1.     Degenerative findings:     T12-L1: There is no focal disc herniation. No significant central canal narrowing . No significant neural foraminal narrowing.     L1-L2: There is no focal disc herniation. No significant central canal narrowing . No significant neural foraminal narrowing.     L2-L3: Broad-based disc bulge, facet degenerative change and ligamentum flavum thickening which contribute to  mild central canal narrowing and encroachment upon the LEFT lateral recess.  No significant neural foraminal narrowing.     L3-L4: Broad-based disc bulge, facet degenerative change and ligamentum flavum thickening which contribute to severe central canal narrowing and narrowing of the lateral recesses bilaterally.  Mild RIGHT and severe LEFT neural foraminal narrowing.     L4-L5: Broad-based disc bulge, facet degenerative change and ligamentum flavum thickening which contribute to severe central canal narrowing and narrowing of the lateral recesses bilaterally. Moderate-severe bilateral RIGHT worse than LEFT neural foraminal narrowing.     L5-S1: There is no focal disc herniation. No significant central canal narrowing . Mild bilateral neural foraminal narrowing.     Paraspinal muscles & soft tissues: Unremarkable.     Impression:     Multilevel lumbar spondylosis, worst L3-L4 L4-L5 level with severe central canal narrowing and associated neural foraminal encroachment as detailed above.        Electronically signed by: John Soto MD  Date:                                             08/09/2023  Time:                                           10:50     Narrative & Impression    EXAMINATION:  XR LUMBAR SPINE AP AND LATERAL     CLINICAL HISTORY:  Other chronic pain     TECHNIQUE:  AP, lateral and spot images were performed of the lumbar spine.     COMPARISON:  None     FINDINGS:  20 degree or so lumbar dextrocurvature.  No acute fractures visualized lower thoracic or lumbar vertebral segments, preserved vertebral body heights and pedicles.  Five non rib-bearing lumbar vertebral segments.  No spondylolysis.  Grade 1 anterolisthesis L3 on L4 and L4 on L5..  Moderate to severe disc narrowing L3-L4 level with opposing endplate sclerosis and vacuum phenomenon.  Moderate to severe disc narrowing L4-L5 level.  Moderate to severe disc narrowing and vacuum phenomenon L5-S1 level.  Multilevel lumbar facet arthropathic changes greatest about the lower 3 levels.  Some abdominal aortic wall vascular calcification.  Intact right and left SI joints and visualized hip joint spaces.     Impression:     As above.        Electronically signed by: Octavio Cornelius  Date:                                            08/22/2022  Time:                                           11:22       Assessment:       Encounter Diagnoses   Name Primary?    DDD (degenerative disc disease), lumbar Yes    Lumbar spondylosis     Spinal stenosis of lumbar region with neurogenic claudication          Plan:       Mamta was seen today for follow-up.    Diagnoses and all orders for this visit:    DDD (degenerative disc disease), lumbar    Lumbar spondylosis    Spinal stenosis of lumbar region with neurogenic claudication          Mamta Bates is a 79 y.o. female with chronic bilateral lower back and lower extremity pain.  Exact etiology of pain is somewhat unclear at this time as her symptoms are somewhat nonspecific, also patient is a very poor historian due to dementia.  Symptoms appear to be multifactorial,  consistent with both facet arthropathy as well as neurogenic claudication related to spinal stenosis.  Lumbar MRI does show multilevel degenerative changes most notable at L3-4 and L4-5.  Large broad-based disc bulges at these levels creating severe central canal stenosis as well as moderate to severe bilateral foraminal narrowing.  Anterolisthesis noted at L3 on L4 and L4 on L5.  Mild relief of lower extremity pain following caudal URI.  Some relief following lumbar radiofrequency ablations.    Prior records reviewed.  Pertinent imaging studies reviewed by me. Imaging results were discussed with patient.  No additional interventional procedures at this time.  Patient has undergone caudal epidural steroid injection and lumbar radiofrequency ablations.  Some relief reported by , but very difficult to assess exact degree/quality of relief due to patient's dementia.    We did discuss that patient could possibly benefit from surgery though I am not sure she has a good surgical candidate secondary to her inability to provide reliable information regarding her symptoms.   states that patient would not be willing to undergo surgery.  No referral placed at this time.  Return to clinic as needed.              This note was created by combination of typed  and M-Modal dictation.  Transcription and phonetic errors may be present.  If there are any questions, please contact me.

## 2024-04-03 ENCOUNTER — OFFICE VISIT (OUTPATIENT)
Dept: INTERNAL MEDICINE | Facility: CLINIC | Age: 79
End: 2024-04-03
Payer: MEDICARE

## 2024-04-03 DIAGNOSIS — E72.19 OTHER DISORDERS OF SULFUR-BEARING AMINO-ACID METABOLISM: ICD-10-CM

## 2024-04-03 DIAGNOSIS — M79.7 FIBROMYALGIA: ICD-10-CM

## 2024-04-03 DIAGNOSIS — I70.0 AORTIC ATHEROSCLEROSIS: ICD-10-CM

## 2024-04-03 DIAGNOSIS — I10 HYPERTENSION, UNSPECIFIED TYPE: Primary | ICD-10-CM

## 2024-04-03 DIAGNOSIS — F34.9 PERSISTENT MOOD DISORDER: ICD-10-CM

## 2024-04-03 DIAGNOSIS — F02.B18 OTHER MODERATE FRONTOTEMPORAL DEMENTIA WITH OTHER BEHAVIORAL DISTURBANCE: ICD-10-CM

## 2024-04-03 DIAGNOSIS — G31.09 OTHER MODERATE FRONTOTEMPORAL DEMENTIA WITH OTHER BEHAVIORAL DISTURBANCE: ICD-10-CM

## 2024-04-03 PROCEDURE — 99999 PR PBB SHADOW E&M-EST. PATIENT-LVL IV: CPT | Mod: PBBFAC,,, | Performed by: INTERNAL MEDICINE

## 2024-04-03 PROCEDURE — 99214 OFFICE O/P EST MOD 30 MIN: CPT | Mod: S$PBB,,, | Performed by: INTERNAL MEDICINE

## 2024-04-03 PROCEDURE — 99214 OFFICE O/P EST MOD 30 MIN: CPT | Mod: PBBFAC | Performed by: INTERNAL MEDICINE

## 2024-04-03 RX ORDER — NIFEDIPINE 60 MG/1
60 TABLET, EXTENDED RELEASE ORAL DAILY
Qty: 90 TABLET | Refills: 2 | Status: SHIPPED | OUTPATIENT
Start: 2024-04-03 | End: 2025-04-03

## 2024-04-07 VITALS
SYSTOLIC BLOOD PRESSURE: 136 MMHG | WEIGHT: 142 LBS | HEART RATE: 96 BPM | DIASTOLIC BLOOD PRESSURE: 84 MMHG | HEIGHT: 64 IN | OXYGEN SATURATION: 95 % | TEMPERATURE: 99 F | BODY MASS INDEX: 24.24 KG/M2

## 2024-04-07 PROBLEM — G31.09 OTHER MODERATE FRONTOTEMPORAL DEMENTIA WITH OTHER BEHAVIORAL DISTURBANCE: Status: ACTIVE | Noted: 2023-06-01

## 2024-04-07 PROBLEM — F02.B18 OTHER MODERATE FRONTOTEMPORAL DEMENTIA WITH OTHER BEHAVIORAL DISTURBANCE: Status: ACTIVE | Noted: 2023-06-01

## 2024-04-07 PROBLEM — F34.9 PERSISTENT MOOD DISORDER: Status: ACTIVE | Noted: 2024-04-07

## 2024-04-07 NOTE — PROGRESS NOTES
Subjective:       Patient ID: Mamta Bates is a 79 y.o. female.    Chief Complaint: Hypertension    HPI  She returns for management of hypertension.  She has had hypertension for over a year.  Current treatment has included medications outlined in medication list.  She denies chest pain or shortness of breath.  No palpitations.  Denies left arm or neck pain.  She has fibromyalgia.  Currently followed by pain management     Past medical history:  Hypertension, fibromyalgia, dementia, status post hysterectomy     Medications:  Procardia 60 mg daily     No known drug allergies        Review of Systems   Constitutional:  Negative for chills, fatigue, fever and unexpected weight change.   Respiratory:  Negative for chest tightness and shortness of breath.    Cardiovascular:  Negative for chest pain and palpitations.   Gastrointestinal:  Negative for abdominal pain and blood in stool.   Neurological:  Negative for dizziness, syncope, numbness and headaches.       Objective:      Physical Exam  HENT:      Right Ear: External ear normal.      Left Ear: External ear normal.      Nose: Nose normal.      Mouth/Throat:      Mouth: Mucous membranes are moist.      Pharynx: Oropharynx is clear.   Eyes:      Pupils: Pupils are equal, round, and reactive to light.   Cardiovascular:      Rate and Rhythm: Normal rate and regular rhythm.      Heart sounds: No murmur heard.  Pulmonary:      Breath sounds: Normal breath sounds.   Abdominal:      General: There is no distension.      Palpations: There is no hepatomegaly or splenomegaly.      Tenderness: There is no abdominal tenderness.   Musculoskeletal:      Cervical back: Normal range of motion.   Lymphadenopathy:      Cervical: No cervical adenopathy.      Upper Body:      Right upper body: No axillary adenopathy.      Left upper body: No axillary adenopathy.   Neurological:      Cranial Nerves: No cranial nerve deficit.      Sensory: No sensory deficit.      Motor: Motor function  is intact.      Deep Tendon Reflexes: Reflexes are normal and symmetric.         Assessment/Plan       Assessment and plan:  1.  Hypertension:  Controlled.  Continue Procardia.  Check CMP and CBC  2.  Fibromyalgia:  Follow up with pain management  3. Discussed mammogram and breast exam.  She declined all

## 2024-04-11 DIAGNOSIS — F02.A18 OTHER MILD FRONTOTEMPORAL DEMENTIA WITH OTHER BEHAVIORAL DISTURBANCE: ICD-10-CM

## 2024-04-11 DIAGNOSIS — G47.00 INSOMNIA, UNSPECIFIED TYPE: ICD-10-CM

## 2024-04-11 DIAGNOSIS — G31.09 OTHER MILD FRONTOTEMPORAL DEMENTIA WITH OTHER BEHAVIORAL DISTURBANCE: ICD-10-CM

## 2024-04-12 NOTE — TELEPHONE ENCOUNTER
----- Message from Simran Jordan sent at 4/12/2024  1:29 PM CDT -----  Regarding: Questions and concerns  Contact: [phone]  Type:  Needs Medical Advice    Who Called: Noe  Would the patient rather a call back or a response via [facility]? Call  Best Call Back Number: [phone]  Additional Information: Would like to know if patient is a candidate to take Rexulti for Dementia

## 2024-04-13 RX ORDER — MELOXICAM 7.5 MG/1
7.5 TABLET ORAL DAILY PRN
Qty: 30 TABLET | Refills: 2 | Status: SHIPPED | OUTPATIENT
Start: 2024-04-13

## 2024-04-18 ENCOUNTER — PATIENT MESSAGE (OUTPATIENT)
Dept: NEUROLOGY | Facility: CLINIC | Age: 79
End: 2024-04-18
Payer: MEDICARE

## 2024-04-18 DIAGNOSIS — R45.1 AGITATION: ICD-10-CM

## 2024-04-18 DIAGNOSIS — F02.818 BEHAVIORAL VARIANT FRONTOTEMPORAL DEMENTIA: Primary | ICD-10-CM

## 2024-04-18 DIAGNOSIS — G31.09 BEHAVIORAL VARIANT FRONTOTEMPORAL DEMENTIA: Primary | ICD-10-CM

## 2024-04-30 NOTE — TELEPHONE ENCOUNTER
Spoke with patient's spouse and informed about Rexulti being sent to [facility]. Also informed about PA being required. Once PA is approved, pharmacy will inform patient/spouse about medication being ready for .    Also asked spouse if patient is taking seroquel. Spouse confirmed that patient is taking seroquel. Informed spouse once patient starts rexulti, she can't no longer take both seroquel and rexulti.    Spouse verbally understood.

## 2024-05-02 RX ORDER — BREXPIPRAZOLE 0.5 MG/1
0.5 TABLET ORAL DAILY
Qty: 30 TABLET | Refills: 1 | OUTPATIENT
Start: 2024-05-02

## 2024-06-10 ENCOUNTER — PATIENT MESSAGE (OUTPATIENT)
Dept: INTERNAL MEDICINE | Facility: CLINIC | Age: 79
End: 2024-06-10
Payer: MEDICARE

## 2024-07-09 DIAGNOSIS — F02.A18 OTHER MILD FRONTOTEMPORAL DEMENTIA WITH OTHER BEHAVIORAL DISTURBANCE: ICD-10-CM

## 2024-07-09 DIAGNOSIS — G47.00 INSOMNIA, UNSPECIFIED TYPE: ICD-10-CM

## 2024-07-09 DIAGNOSIS — G31.09 OTHER MILD FRONTOTEMPORAL DEMENTIA WITH OTHER BEHAVIORAL DISTURBANCE: ICD-10-CM

## 2024-07-09 RX ORDER — MELOXICAM 7.5 MG/1
7.5 TABLET ORAL DAILY PRN
Qty: 30 TABLET | Refills: 5 | Status: SHIPPED | OUTPATIENT
Start: 2024-07-09

## 2024-07-16 RX ORDER — NIFEDIPINE 60 MG/1
60 TABLET, EXTENDED RELEASE ORAL DAILY
Qty: 90 TABLET | Refills: 1 | Status: SHIPPED | OUTPATIENT
Start: 2024-07-16 | End: 2025-07-16

## 2024-07-16 NOTE — TELEPHONE ENCOUNTER
No care due was identified.  [facility] Embedded Care Due Messages. Reference number: [id number].   7/16/2024 11:08:54 AM CDT

## 2024-07-19 ENCOUNTER — TELEPHONE (OUTPATIENT)
Dept: NEUROLOGY | Facility: CLINIC | Age: 79
End: 2024-07-19
Payer: MEDICARE

## 2024-07-19 NOTE — TELEPHONE ENCOUNTER
----- Message from Jaison Javier MD sent at 7/19/2024  8:10 AM CDT -----  Regarding: RE: Appt  Contact: [phone]  Not following up on work  FTD with behaviors should go to [facility] assuming there is not pending el  ----- Message -----  From: Octavio Cuellar  Sent: 7/18/2024   7:54 PM CDT  To: Jaison Javier MD  Subject: RE: Appt                                         Virtual call w/ patient on 1/02/2024    Schedule with [facility]?  ----- Message -----  From: Kadie Parikh  Sent: 7/18/2024   1:06 PM CDT  To: [facility] Staff  Subject: Appt                                             Who call ? Pt  Noe     What is the request Details : Pt  calling to speak with someone in provider office regards scheduling an appt for  Memory Disorders. No appt available.     Can clinic  use patient portal  : No     What number to call back : [phone]

## 2024-07-19 NOTE — TELEPHONE ENCOUNTER
Spoke with patient's spouse and assisted with scheduling a virtual appointment with NP on 8/02/2024.

## 2024-07-24 ENCOUNTER — OFFICE VISIT (OUTPATIENT)
Dept: OPTOMETRY | Facility: CLINIC | Age: 79
End: 2024-07-24
Payer: COMMERCIAL

## 2024-07-24 DIAGNOSIS — H52.4 PRESBYOPIA: Primary | ICD-10-CM

## 2024-07-24 DIAGNOSIS — H25.13 NUCLEAR SCLEROSIS, BILATERAL: ICD-10-CM

## 2024-07-24 DIAGNOSIS — Z13.5 GLAUCOMA SCREENING: ICD-10-CM

## 2024-07-24 PROCEDURE — 99999 PR PBB SHADOW E&M-EST. PATIENT-LVL III: CPT | Mod: PBBFAC,,, | Performed by: OPTOMETRIST

## 2024-07-24 PROCEDURE — 92014 COMPRE OPH EXAM EST PT 1/>: CPT | Mod: S$GLB,,, | Performed by: OPTOMETRIST

## 2024-07-24 PROCEDURE — 92015 DETERMINE REFRACTIVE STATE: CPT | Mod: S$GLB,,, | Performed by: OPTOMETRIST

## 2024-07-24 NOTE — PROGRESS NOTES
HPI    78 Y/o female is here for routine eye exam with no C/o about ocular health   pt is here with , pt refuses eye gtt    Pt denies pain and discomfort   No f/f    Eye med: no gtt   Last edited by Fabiola Almaraz MA on 7/24/2024  9:03 AM.            Assessment /Plan     For exam results, see Encounter Report.    Presbyopia    Glaucoma screening    Nuclear sclerosis, bilateral      Dementia pt-- present for exam    1. Cat OU--wrote optional new Rx  2. MELISSA--advised SOOTHE XP ATs prn    PLAN:    rtc 1 yr

## 2024-07-25 ENCOUNTER — PATIENT MESSAGE (OUTPATIENT)
Dept: NEUROLOGY | Facility: CLINIC | Age: 79
End: 2024-07-25
Payer: MEDICARE

## 2024-08-01 ENCOUNTER — TELEPHONE (OUTPATIENT)
Dept: NEUROLOGY | Facility: CLINIC | Age: 79
End: 2024-08-01
Payer: MEDICARE

## 2024-08-01 NOTE — TELEPHONE ENCOUNTER
Called Pt and left vm stating appt time for 8/2 vv with Shazia, and to arrive 15 min prior to appt time and to have meds on hand for visit discussion.

## 2024-08-02 ENCOUNTER — PATIENT MESSAGE (OUTPATIENT)
Dept: ADMINISTRATIVE | Facility: OTHER | Age: 79
End: 2024-08-02
Payer: MEDICARE

## 2024-08-02 ENCOUNTER — OFFICE VISIT (OUTPATIENT)
Dept: NEUROLOGY | Facility: CLINIC | Age: 79
End: 2024-08-02
Payer: MEDICARE

## 2024-08-02 DIAGNOSIS — R45.1 AGITATION: ICD-10-CM

## 2024-08-02 DIAGNOSIS — G31.09 BEHAVIORAL VARIANT FRONTOTEMPORAL DEMENTIA: ICD-10-CM

## 2024-08-02 DIAGNOSIS — I70.0 AORTIC ATHEROSCLEROSIS: Primary | ICD-10-CM

## 2024-08-02 DIAGNOSIS — F02.818 BEHAVIORAL VARIANT FRONTOTEMPORAL DEMENTIA: ICD-10-CM

## 2024-08-02 PROCEDURE — 99214 OFFICE O/P EST MOD 30 MIN: CPT | Mod: 95,,, | Performed by: NURSE PRACTITIONER

## 2024-08-02 RX ORDER — NIFEDIPINE 60 MG/1
60 TABLET, EXTENDED RELEASE ORAL DAILY
Qty: 90 TABLET | Refills: 1 | Status: SHIPPED | OUTPATIENT
Start: 2024-08-02 | End: 2025-08-02

## 2024-08-06 ENCOUNTER — PATIENT MESSAGE (OUTPATIENT)
Dept: ADMINISTRATIVE | Facility: OTHER | Age: 79
End: 2024-08-06
Payer: MEDICARE

## 2024-08-06 RX ORDER — RISPERIDONE 0.5 MG/1
0.5 TABLET, ORALLY DISINTEGRATING ORAL 2 TIMES DAILY
Qty: 60 TABLET | Refills: 1 | Status: SHIPPED | OUTPATIENT
Start: 2024-08-06 | End: 2025-08-06

## 2024-11-26 ENCOUNTER — PATIENT MESSAGE (OUTPATIENT)
Dept: ADMINISTRATIVE | Facility: HOSPITAL | Age: 79
End: 2024-11-26
Payer: MEDICARE

## 2024-11-26 RX ORDER — GABAPENTIN 300 MG/1
300 CAPSULE ORAL 3 TIMES DAILY
Qty: 90 CAPSULE | Refills: 11 | Status: SHIPPED | OUTPATIENT
Start: 2024-11-26

## 2024-12-27 ENCOUNTER — TELEPHONE (OUTPATIENT)
Dept: INTERNAL MEDICINE | Facility: CLINIC | Age: 79
End: 2024-12-27
Payer: MEDICARE

## 2025-01-12 DIAGNOSIS — G31.09 OTHER MILD FRONTOTEMPORAL DEMENTIA WITH OTHER BEHAVIORAL DISTURBANCE: ICD-10-CM

## 2025-01-12 DIAGNOSIS — F02.A18 OTHER MILD FRONTOTEMPORAL DEMENTIA WITH OTHER BEHAVIORAL DISTURBANCE: ICD-10-CM

## 2025-01-12 DIAGNOSIS — G47.00 INSOMNIA, UNSPECIFIED TYPE: ICD-10-CM

## 2025-01-14 RX ORDER — MELOXICAM 7.5 MG/1
7.5 TABLET ORAL DAILY PRN
Qty: 30 TABLET | Refills: 5 | Status: SHIPPED | OUTPATIENT
Start: 2025-01-14

## 2025-01-21 RX ORDER — QUETIAPINE FUMARATE 50 MG/1
50 TABLET, FILM COATED ORAL NIGHTLY
Qty: 90 TABLET | Refills: 3 | Status: SHIPPED | OUTPATIENT
Start: 2025-01-21

## 2025-01-29 DIAGNOSIS — Z78.0 MENOPAUSE: ICD-10-CM

## 2025-02-21 DIAGNOSIS — Z00.00 ENCOUNTER FOR MEDICARE ANNUAL WELLNESS EXAM: ICD-10-CM

## 2025-03-03 ENCOUNTER — TELEPHONE (OUTPATIENT)
Dept: INTERNAL MEDICINE | Facility: CLINIC | Age: 80
End: 2025-03-03
Payer: MEDICARE

## 2025-03-03 NOTE — TELEPHONE ENCOUNTER
----- Message from Kimberly sent at 3/3/2025 10:59 AM CST -----  Contact: [phone] Noe/spouse  Patient would like to get medical advice.Symptoms (please be specific):   Pt ( MRN: [id number])states he is a current pt of Dr Casey. Pt  states due to his wife's dementia he would like to come in at one time with the same provider for his and her annual physicals. Pt  is also asking for the pt to est care with Dr Casey as her PCP. How long have you had these symptoms: N/AWould you like a call back, or a response through your [facility] portal?:   call back [phone] Noe/spousePharmacy name and phone # (copy from chart):   N/AComments:

## 2025-07-01 ENCOUNTER — TELEPHONE (OUTPATIENT)
Dept: INTERNAL MEDICINE | Facility: CLINIC | Age: 80
End: 2025-07-01
Payer: MEDICARE

## 2025-07-03 ENCOUNTER — LAB VISIT (OUTPATIENT)
Dept: LAB | Facility: HOSPITAL | Age: 80
End: 2025-07-03
Attending: INTERNAL MEDICINE
Payer: MEDICARE

## 2025-07-03 ENCOUNTER — OFFICE VISIT (OUTPATIENT)
Dept: INTERNAL MEDICINE | Facility: CLINIC | Age: 80
End: 2025-07-03
Payer: MEDICARE

## 2025-07-03 VITALS
OXYGEN SATURATION: 95 % | BODY MASS INDEX: 23.27 KG/M2 | DIASTOLIC BLOOD PRESSURE: 80 MMHG | WEIGHT: 135.56 LBS | HEART RATE: 82 BPM | SYSTOLIC BLOOD PRESSURE: 124 MMHG

## 2025-07-03 DIAGNOSIS — Z00.00 ANNUAL PHYSICAL EXAM: Primary | ICD-10-CM

## 2025-07-03 DIAGNOSIS — M54.16 LUMBAR RADICULOPATHY: ICD-10-CM

## 2025-07-03 DIAGNOSIS — R79.9 ABNORMAL FINDING OF BLOOD CHEMISTRY, UNSPECIFIED: ICD-10-CM

## 2025-07-03 DIAGNOSIS — I10 PRIMARY HYPERTENSION: ICD-10-CM

## 2025-07-03 DIAGNOSIS — I70.0 AORTIC ATHEROSCLEROSIS: ICD-10-CM

## 2025-07-03 DIAGNOSIS — E78.5 HYPERLIPIDEMIA, UNSPECIFIED HYPERLIPIDEMIA TYPE: ICD-10-CM

## 2025-07-03 DIAGNOSIS — F02.B18 OTHER MODERATE FRONTOTEMPORAL DEMENTIA WITH OTHER BEHAVIORAL DISTURBANCE: ICD-10-CM

## 2025-07-03 DIAGNOSIS — G31.09 OTHER MODERATE FRONTOTEMPORAL DEMENTIA WITH OTHER BEHAVIORAL DISTURBANCE: ICD-10-CM

## 2025-07-03 DIAGNOSIS — Z00.00 ANNUAL PHYSICAL EXAM: ICD-10-CM

## 2025-07-03 LAB
ABSOLUTE EOSINOPHIL (OHS): 0.11 K/UL
ABSOLUTE MONOCYTE (OHS): 0.42 K/UL (ref 0.3–1)
ABSOLUTE NEUTROPHIL COUNT (OHS): 3.33 K/UL (ref 1.8–7.7)
ALBUMIN SERPL BCP-MCNC: 4.2 G/DL (ref 3.5–5.2)
ALP SERPL-CCNC: 92 UNIT/L (ref 40–150)
ALT SERPL W/O P-5'-P-CCNC: 7 UNIT/L (ref 10–44)
ANION GAP (OHS): 9 MMOL/L (ref 8–16)
AST SERPL-CCNC: 19 UNIT/L (ref 11–45)
BASOPHILS # BLD AUTO: 0.05 K/UL
BASOPHILS NFR BLD AUTO: 0.9 %
BILIRUB SERPL-MCNC: 1 MG/DL (ref 0.1–1)
BUN SERPL-MCNC: 11 MG/DL (ref 8–23)
CALCIUM SERPL-MCNC: 9.9 MG/DL (ref 8.7–10.5)
CHLORIDE SERPL-SCNC: 110 MMOL/L (ref 95–110)
CHOLEST SERPL-MCNC: 242 MG/DL (ref 120–199)
CHOLEST/HDLC SERPL: 4.7 {RATIO} (ref 2–5)
CO2 SERPL-SCNC: 23 MMOL/L (ref 23–29)
CREAT SERPL-MCNC: 0.8 MG/DL (ref 0.5–1.4)
EAG (OHS): 97 MG/DL (ref 68–131)
ERYTHROCYTE [DISTWIDTH] IN BLOOD BY AUTOMATED COUNT: 12.9 % (ref 11.5–14.5)
GFR SERPLBLD CREATININE-BSD FMLA CKD-EPI: >60 ML/MIN/1.73/M2
GLUCOSE SERPL-MCNC: 84 MG/DL (ref 70–110)
HBA1C MFR BLD: 5 % (ref 4–5.6)
HCT VFR BLD AUTO: 39.5 % (ref 37–48.5)
HDLC SERPL-MCNC: 51 MG/DL (ref 40–75)
HDLC SERPL: 21.1 % (ref 20–50)
HGB BLD-MCNC: 12.4 GM/DL (ref 12–16)
IMM GRANULOCYTES # BLD AUTO: 0.01 K/UL (ref 0–0.04)
IMM GRANULOCYTES NFR BLD AUTO: 0.2 % (ref 0–0.5)
LDLC SERPL CALC-MCNC: 168.6 MG/DL (ref 63–159)
LYMPHOCYTES # BLD AUTO: 1.81 K/UL (ref 1–4.8)
MCH RBC QN AUTO: 28.7 PG (ref 27–31)
MCHC RBC AUTO-ENTMCNC: 31.4 G/DL (ref 32–36)
MCV RBC AUTO: 91 FL (ref 82–98)
NONHDLC SERPL-MCNC: 191 MG/DL
NUCLEATED RBC (/100WBC) (OHS): 0 /100 WBC
PLATELET # BLD AUTO: 284 K/UL (ref 150–450)
PMV BLD AUTO: 10.2 FL (ref 9.2–12.9)
POTASSIUM SERPL-SCNC: 3.5 MMOL/L (ref 3.5–5.1)
PROT SERPL-MCNC: 7.3 GM/DL (ref 6–8.4)
RBC # BLD AUTO: 4.32 M/UL (ref 4–5.4)
RELATIVE EOSINOPHIL (OHS): 1.9 %
RELATIVE LYMPHOCYTE (OHS): 31.6 % (ref 18–48)
RELATIVE MONOCYTE (OHS): 7.3 % (ref 4–15)
RELATIVE NEUTROPHIL (OHS): 58.1 % (ref 38–73)
SODIUM SERPL-SCNC: 142 MMOL/L (ref 136–145)
TRIGL SERPL-MCNC: 112 MG/DL (ref 30–150)
WBC # BLD AUTO: 5.73 K/UL (ref 3.9–12.7)

## 2025-07-03 PROCEDURE — 99999 PR PBB SHADOW E&M-EST. PATIENT-LVL III: CPT | Mod: PBBFAC,,, | Performed by: INTERNAL MEDICINE

## 2025-07-03 PROCEDURE — 99213 OFFICE O/P EST LOW 20 MIN: CPT | Mod: PBBFAC | Performed by: INTERNAL MEDICINE

## 2025-07-03 PROCEDURE — 36415 COLL VENOUS BLD VENIPUNCTURE: CPT

## 2025-07-03 PROCEDURE — 85025 COMPLETE CBC W/AUTO DIFF WBC: CPT

## 2025-07-03 PROCEDURE — 83036 HEMOGLOBIN GLYCOSYLATED A1C: CPT

## 2025-07-03 PROCEDURE — 80053 COMPREHEN METABOLIC PANEL: CPT

## 2025-07-03 PROCEDURE — 80061 LIPID PANEL: CPT

## 2025-07-03 RX ORDER — GABAPENTIN 300 MG/1
300 CAPSULE ORAL 3 TIMES DAILY
Qty: 270 CAPSULE | Refills: 3 | Status: SHIPPED | OUTPATIENT
Start: 2025-07-03 | End: 2026-07-03

## 2025-07-03 RX ORDER — RISPERIDONE 0.5 MG/1
0.5 TABLET, ORALLY DISINTEGRATING ORAL 2 TIMES DAILY
Qty: 60 TABLET | Refills: 1 | Status: SHIPPED | OUTPATIENT
Start: 2025-07-03 | End: 2026-07-03

## 2025-07-03 RX ORDER — TIZANIDINE 2 MG/1
1-2 TABLET ORAL EVERY 8 HOURS PRN
Qty: 90 TABLET | Refills: 2 | Status: SHIPPED | OUTPATIENT
Start: 2025-07-03

## 2025-07-03 RX ORDER — NIFEDIPINE 60 MG/1
60 TABLET, EXTENDED RELEASE ORAL DAILY
Qty: 90 TABLET | Refills: 3 | Status: SHIPPED | OUTPATIENT
Start: 2025-07-03 | End: 2026-07-03

## 2025-07-03 RX ORDER — QUETIAPINE FUMARATE 50 MG/1
50 TABLET, FILM COATED ORAL NIGHTLY
Qty: 90 TABLET | Refills: 3 | Status: SHIPPED | OUTPATIENT
Start: 2025-07-03

## 2025-07-03 NOTE — PROGRESS NOTES
CHIEF COMPLAINT     Chief Complaint   Patient presents with    [facility]       HPI     Mamta Bates is a 80 y.o. female Frontotemporal dementia, HTN, HLD, here today for annual visit.     Previously followed by Dr. Harris, making switch so that her  can coordinate their visits.   helps with her medications.    Take Seroquel nightly.  She has PRN risperidone for when she gets agitated    Having issues with low back pain status post nerve block 2 right side which provided temporary relief.  She takes Zanaflex and gabapentin for symptomatic relief.  Dementia limits her ability to participate in physical treatment      Personally Reviewed Patient's Medical, surgical, family and social hx. Changes updated in [facility].  Care Team updated in Epic    Review of Systems:  Review of Systems   Musculoskeletal:  Positive for arthralgias and back pain.   Psychiatric/Behavioral:  Positive for confusion.        Health Maintenance:   Reviewed with patient  Due for the following:      PHYSICAL EXAM     /80   Pulse 82   Wt 61.5 kg (135 lb 9.3 oz)   SpO2 95%   BMI 23.27 kg/m²     Gen: Well Appearing, NAD  HEENT: PERR, EOMI  Neck: FROM, no thyromegaly, no cervical adenopathy  CVD: RRR, no M/R/G  Pulm: Normal work of breathing, CTAB, no wheezing  Abd:  Soft, NT, ND non TTP, no mass  MSK: no LE edema  Neuro: alert and oriented to person,  gait normal, speech normal  Mood; Mood normal, behavior normal, thought process tangential and not redirectable      LABS     Labs reviewed; Notable for    ASSESSMENT     1. Annual physical exam  CBC Auto Differential    Comprehensive Metabolic Panel    Hemoglobin A1C    Lipid Panel      2. Aortic atherosclerosis        3. 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 Lumbar radiculopathy  gabapentin (NEURONTIN) 300 MG capsule    tiZANidine (ZANAFLEX) 2 MG  tablet      5. Primary hypertension  NIFEdipine (PROCARDIA-XL) 60 MG (OSM) 24 hr tablet    CBC Auto Differential    Comprehensive Metabolic Panel    CANCELED: CBC Auto Differential    CANCELED: Comprehensive Metabolic Panel    CANCELED: Hemoglobin A1C      6. Hyperlipidemia, unspecified hyperlipidemia type  Hemoglobin A1C    Lipid Panel    CANCELED: CBC Auto Differential    CANCELED: Comprehensive Metabolic Panel    CANCELED: Hemoglobin A1C    CANCELED: Lipid Panel      7. Abnormal finding of blood chemistry, unspecified  Hemoglobin A1C    CANCELED: Hemoglobin A1C              Plan     Mamta Bates is a 80 y.o. female with Frontotemporal dementia, HTN, HLD,    1. Annual physical exam  100% dependent on care.  - CBC Auto Differential; Future  - Comprehensive Metabolic Panel; Future  - Hemoglobin A1C; Future  - Lipid Panel; Future    2. Aortic atherosclerosis  Radiologic diagnosis  Control blood pressure and hyperlipidemia    3. 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  Dispense: 60 tablet; Refill: 1  - QUEtiapine (SEROQUEL) 50 MG tablet; Take 1 tablet (50 mg total) by mouth every evening.  Dispense: 90 tablet; Refill: 3    4. Lumbar radiculopathy  Continue Neurontin and Zanaflex  - gabapentin (NEURONTIN) 300 MG capsule; Take 1 capsule (300 mg total) by mouth 3 (three) times daily.  Dispense: 270 capsule; Refill: 3  - tiZANidine (ZANAFLEX) 2 MG tablet; Take 0.5-1 tablets (1-2 mg total) by mouth every 8 (eight) hours as needed (back pain).  Dispense: 90 tablet; Refill: 2    5. Primary hypertension  At goal continue nifedipine  - NIFEdipine (PROCARDIA-XL) 60 MG (OSM) 24 hr tablet; Take 1 tablet (60 mg total) by mouth once daily.  Dispense: 90  tablet; Refill: 3  - CBC Auto Differential; Future  - Comprehensive Metabolic Panel; Future    6. Hyperlipidemia, unspecified hyperlipidemia type  Recheck lipid panel  - Hemoglobin A1C; Future  - Lipid Panel; Future    7. Abnormal finding of blood chemistry, unspecified  - Hemoglobin A1C; Future      Cy Casey MD

## 2025-08-01 DIAGNOSIS — F02.A18 OTHER MILD FRONTOTEMPORAL DEMENTIA WITH OTHER BEHAVIORAL DISTURBANCE: ICD-10-CM

## 2025-08-01 DIAGNOSIS — G47.00 INSOMNIA, UNSPECIFIED TYPE: ICD-10-CM

## 2025-08-01 DIAGNOSIS — G31.09 OTHER MILD FRONTOTEMPORAL DEMENTIA WITH OTHER BEHAVIORAL DISTURBANCE: ICD-10-CM

## 2025-08-03 RX ORDER — MELOXICAM 7.5 MG/1
7.5 TABLET ORAL DAILY PRN
Qty: 30 TABLET | Refills: 0 | Status: SHIPPED | OUTPATIENT
Start: 2025-08-03

## 2025-08-31 DIAGNOSIS — G31.09 OTHER MILD FRONTOTEMPORAL DEMENTIA WITH OTHER BEHAVIORAL DISTURBANCE: ICD-10-CM

## 2025-08-31 DIAGNOSIS — F02.A18 OTHER MILD FRONTOTEMPORAL DEMENTIA WITH OTHER BEHAVIORAL DISTURBANCE: ICD-10-CM

## 2025-08-31 DIAGNOSIS — G47.00 INSOMNIA, UNSPECIFIED TYPE: ICD-10-CM

## 2025-09-02 RX ORDER — MELOXICAM 7.5 MG/1
7.5 TABLET ORAL DAILY PRN
Qty: 30 TABLET | Refills: 0 | Status: SHIPPED | OUTPATIENT
Start: 2025-09-02